# Patient Record
Sex: MALE | Race: WHITE | NOT HISPANIC OR LATINO | Employment: OTHER | URBAN - METROPOLITAN AREA
[De-identification: names, ages, dates, MRNs, and addresses within clinical notes are randomized per-mention and may not be internally consistent; named-entity substitution may affect disease eponyms.]

---

## 2018-03-15 ENCOUNTER — OFFICE VISIT (OUTPATIENT)
Dept: CARDIOLOGY | Facility: MEDICAL CENTER | Age: 78
End: 2018-03-15
Payer: MEDICARE

## 2018-03-15 VITALS
OXYGEN SATURATION: 92 % | HEART RATE: 90 BPM | BODY MASS INDEX: 26.48 KG/M2 | SYSTOLIC BLOOD PRESSURE: 158 MMHG | HEIGHT: 70 IN | WEIGHT: 185 LBS | DIASTOLIC BLOOD PRESSURE: 90 MMHG

## 2018-03-15 DIAGNOSIS — E67.3 HYPERVITAMINOSIS D: ICD-10-CM

## 2018-03-15 DIAGNOSIS — R00.0 TACHYCARDIA: ICD-10-CM

## 2018-03-15 DIAGNOSIS — I48.21 PERMANENT ATRIAL FIBRILLATION (HCC): ICD-10-CM

## 2018-03-15 DIAGNOSIS — R42 POSTURAL DIZZINESS: ICD-10-CM

## 2018-03-15 LAB — EKG IMPRESSION: NORMAL

## 2018-03-15 PROCEDURE — 99204 OFFICE O/P NEW MOD 45 MIN: CPT | Performed by: INTERNAL MEDICINE

## 2018-03-15 PROCEDURE — 93000 ELECTROCARDIOGRAM COMPLETE: CPT | Performed by: INTERNAL MEDICINE

## 2018-03-15 NOTE — PROGRESS NOTES
Subjective:   Elias Casanova is a 77 y.o. male who presents today having been referred by Dr. Jesus after an irregular heart rate was identified. This was earlier this week at which time the patient's thyroid supplement was discontinued. He has a long history of Parkinson's disease but with the treatment outlined by Dr. Jesus his symptoms are much improved and he feels much better. He does not have a sensation of an irregular heartbeat but can detect it if he tries to check his pulse. He has some postural lightheadedness but it is an inconsistent finding. There is a history of hypertension in the past. The patient denies orthopnea, PND, or pedal edema. He has not had angina, chest discomfort or history of previous myocardial infarction. His home regimen is noted below.. He has not had a stroke, TIA symptoms or symptoms of claudication.    Chief Complaint: Irregular heartbeat    Past Medical History:   Diagnosis Date   • Parkinson's disease (CMS-[facility])      History reviewed. No pertinent surgical history.  History reviewed. No pertinent family history.  History   Smoking Status   • Never Smoker   Smokeless Tobacco   • Never Used     Allergies   Allergen Reactions   • Novocain Unspecified     Eye swelling and redness     Outpatient Encounter Prescriptions as of 3/15/2018   Medication Sig Dispense Refill   • aspirin EC (ECOTRIN) 81 MG Tablet Delayed Response Take 81 mg by mouth every day.     • Tyrosine Powder 2.5 g by Does not apply route 4 times a day.     • Non Formulary Request Take 2,400 mg by mouth 4 times a day.     • Cysteine 500 MG Cap Take 3,000 mg by mouth 4 times a day.     • 5-Hydroxytryptophan (5-HTP) 100 MG Cap Take 300 mg by mouth 4 times a day.       No facility-administered encounter medications on file as of 3/15/2018.      ROS. The patient's review of systems sheet was evaluated with he and his wife and indicates that he has some ringing in his ears occasional sensation of  "breathlessness when he feels exhausted in the evening enlace flat. He typically prefers to sleep in the supine position without his head been elevated much. He has occasional abdominal pain and some urinary frequency. All other review responses are negative. He has had Parkinson's for 16 years is  and has a significant other. [place]     Objective:   /90   Pulse 90   Ht 1.778 m (5' 10\")   Wt 83.9 kg (185 lb)   SpO2 92%   BMI 26.54 kg/m²     Physical Exam   Exam:    Vitals:    03/15/18 1307   BP: 158/90   Pulse: 90   SpO2: 92%   Weight: 83.9 kg (185 lb)   Height: 1.778 m (5' 10\")     Constitutional: Well developed, well nourished male somewhat chronically ill appearing, in no acute distress. Appears approximate stated age and provides a good history. Supine blood pressure 160/9, sitting blood pressure 146/90, standing blood pressure 116/70. There was increased heart rate with position change.  HEENT: Normocephalic, pupils are equal and responsive. bilateral arcus. Conjunctiva and sclera are clear. No xanthelasma. No diagonal ear lobe crease noted. Mucus membranes are moist and pink. Good oral hygiene  Neck: No JVD or HJR. JVP = 4-5cm H2O. Good carotid upstroke with no bruit. No lymphadenopathy, No thyroid enlargement.  Cardiovascular:  Irregular rhythm. No murmur, gallop, rub or click. No lift, heave,  thrill, or cardiomegaly  Lungs: Normal effort, Clear to auscultation and percussion. No rales, rhonchi, wheezing   Abdomen: Soft, non tender. No liver, kidney, spleen or mass palpable. The abdominal aorta is not enlarged. Active bowel sounds are noted and there is no bruit  Extremities:  No cyanosis, edema, clubbing. Palpable posterior tibial and dorsal pedal pulses bilaterally.  Skin:   Warm and dry, no active lesions  Neurologic: Alert & oriented.  No lateralizing signs  Psychiatric:  Affect, mood, and judgement are appropriate    EKG reveals atrial fibrillation with " moderate ventricular response    Assessment:     1. Permanent atrial fibrillation (CMS-HCC)     2. Postural dizziness     3. Tachycardia  EKG    COMP METABOLIC PANEL    Echocardiogram Comp w/o Cont   4. Hypervitaminosis D  VITAMIN D 25-HYDROXY       Medical Decision Making:  Today's Assessment / Status / Plan:   The patient's EKG reveals atrial fibrillation with a moderate ventricular response. This is probably a chronic disorder. The patient has been taking aspirin but I suggested because of his age as a significant risk factor that full anticoagulation was probably indicated as aspirin tends to lose its affect on stroke prevention as someone ages. I gave him a list of Xarelto 20 mg a day, Eliquis 5 mg twice a day, and Pradaxa 150 mg twice a day. He will see which one is covered by his insurance and will notify us by phone at which time a prescription will be called in. Currently he does not need anything for rate control . If such is required, beta blocker therapy would be indicated. His medication list includes vitamin D 15,000 international units per day which may be excessive. It is possible that hypervitaminosis D could be aggravating his atrial fibrillation and heart rate response. I suggested that, temporarily at least, he discontinue the vitamin D supplement. He will return in one month at which time we will go over an echocardiogram which I ordered as well as his metabolic panel to make sure that we have the correct dose of his anticoagulation.    His postural hypotension is significant as noted above. This is likely due to Parkinson's disease and rather than a dressing it with medications I suggested that he just take his time about changing position to avoid this symptom. He will return tomorrow to assess his response to therapy.

## 2018-03-15 NOTE — PROGRESS NOTES
"Subjective:   Elias Casanova is a 77 y.o. male who presents today having been referred by his endocrinologist, Dr. Dotson, because of vascular calcification noted on the hand x-ray which was done for other reasons. The patient has had type 1 diabetes for 48 years currently being controlled with an insulin pump. His most recent A-1 C is 8. He has no anginal-type symptoms and is quite active in his day-to-day work. 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 He has a very slight sensation of breathlessness when doing some of his tasks which he is usually able to work through and then can carry on for the rest of the day without problems. He has no symptoms of congestive heart failure. There is no orthopnea, PND, or pedal edema. He has no symptoms of stroke, TIA, or claudication. He takes lisinopril for hypertension and lovastatin for hyperlipidemia. He has a rare episode of postural dizziness.    Chief Complaint: dyspnea    Past Medical History:   Diagnosis Date   • Parkinson's disease (CMS-[facility])      History reviewed. No pertinent surgical history.  History reviewed. No pertinent family history.  History   Smoking Status   • Never Smoker   Smokeless Tobacco   • Never Used     Allergies   Allergen Reactions   • Novocain Unspecified     Eye swelling and redness     Outpatient Encounter Prescriptions as of 3/15/2018   Medication Sig Dispense Refill   • aspirin EC (ECOTRIN) 81 MG Tablet Delayed Response Take 81 mg by mouth every day.     • Tyrosine Powder 2.5 g by Does not apply route 4 times a day.     • Non Formulary Request Take 2,400 mg by mouth 4 times a day.     • Cysteine 500 MG Cap Take 3,000 mg by mouth 4 times a day.     • 5-Hydroxytryptophan (5-HTP) 100 MG Cap Take 300 mg by mouth 4 times a day.       No facility-administered encounter medications on file as of 3/15/2018.      ROS. See HPI     Objective:   /90   Pulse 90   Ht 1.778 m (5' 10\")   Wt 83.9 kg " "(185 lb)   SpO2 92%   BMI 26.54 kg/m²     Physical Exam   Exam:    Vitals:    03/15/18 1307   BP: 158/90   Pulse: 90   SpO2: 92%   Weight: 83.9 kg (185 lb)   Height: 1.778 m (5' 10\")     Constitutional: Well developed, well nourished, slightly obese male in no acute distress. Appears approximate stated age and provides a good history. Repeat blood pressure supine 146/74, sitting 130/62, standing 134/62, standing for 2 minutes unchanged  HEENT: Normocephalic, pupils are equal and responsive. Slight arcus. Conjunctiva and sclera are clear. No xanthelasma. No diagonal ear lobe crease noted. Mucus membranes are moist and pink. Good oral hygiene  Neck: No JVD or HJR. JVP = 4-5cm H2O. Good carotid upstroke with no bruit. No lymphadenopathy, No thyroid enlargement.  Cardiovascular: Regular rhythm, no ectopics. No murmur, gallop, rub or click. No lift, heave,  thrill, or cardiomegaly  Lungs: Normal effort, Clear to auscultation and percussion. No rales, rhonchi, wheezing   Abdomen: Soft, non tender. No liver, kidney, spleen or mass palpable. The abdominal aorta is not palpable. Active bowel sounds are noted and there is no bruit  Extremities:  No cyanosis, edema, clubbing. 1+ posterior tibial and dorsal pedal pulses bilaterally.   Skin:   Warm and dry, no active lesions  Neurologic: Alert & oriented. Strength and sensation grossly intact. No lateralizing signs  Psychiatric:  Affect, mood, and judgement are appropriate    Assessment:     1. 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 Vascular calcification  RIH Treadmill Stress       Medical Decision Making:  Today's Assessment / Status / Plan:   The patient's exertional dyspnea as a vague symptom only offered after suggestion. He does not have angina but that would not be unusual in a long-standing diabetic. He has extensive vascular calcification of his radial and ulnar arteries as noted on x-ray. It is likely that he " has diffuse calcification elsewhere but no symptoms of of vascular insufficiency. Because he may very well have silent ischemia think it would be reasonable to do a stress treadmill to be scheduled in the near future. He does not have substantial postural blood pressure changes today but he is taking a generous dose of lisinopril. His episodes of postural lightheadedness are infrequent and I suggested that he change position slowly because there is benefit of taking ACE inhibitor for kidney protection and his blood pressure is not low at baseline. He will return in a month or so after the treadmill or sooner if the treadmill is abnormal.

## 2018-03-16 ENCOUNTER — TELEPHONE (OUTPATIENT)
Dept: CARDIOLOGY | Facility: MEDICAL CENTER | Age: 78
End: 2018-03-16

## 2018-03-16 DIAGNOSIS — I48.21 PERMANENT ATRIAL FIBRILLATION (HCC): ICD-10-CM

## 2018-03-16 RX ORDER — DABIGATRAN ETEXILATE 150 MG/1
150 CAPSULE ORAL 2 TIMES DAILY
Qty: 180 CAP | Refills: 3 | Status: SHIPPED | OUTPATIENT
Start: 2018-03-16 | End: 2019-02-09 | Stop reason: SDUPTHER

## 2018-03-16 NOTE — TELEPHONE ENCOUNTER
Pt calling back to report that he reviewed cost of NOAC per Dr. Brice's request. Pt would like to start Pradaxa 150 mg BID. Pt educated on medication and questions answered. At this time, pt requesting medication to be sent to [facility] pharmacy in [facility] off [address]. He notes that he did stop taking vit. D per Dr. Brice request. Pt denies further questions at this time and is appreciative of assistance.     Pradaxa 150mg BID #180 with 3 refills called into pharmacy    ALEJANDRA LOGAN  ----- Message from Cecelia Dumont sent at 3/16/2018 12:09 PM PDT -----  Regarding: patient following up after yesterday's appt  DESMOND/Tonia    Patient is following up after seeing Dr. Brice yesterday. He can be reached at [phone].

## 2018-03-23 ENCOUNTER — TELEPHONE (OUTPATIENT)
Dept: CARDIOLOGY | Facility: MEDICAL CENTER | Age: 78
End: 2018-03-23

## 2018-03-23 NOTE — TELEPHONE ENCOUNTER
Discussed with Dr. Brice while in office today. No treadmill needed at this time. Pt does require echocardiogram which has been scheduled.

## 2018-03-23 NOTE — TELEPHONE ENCOUNTER
----- Message from Cecelia Dumont sent at 3/23/2018  2:35 PM PDT -----  Regarding: patient calling about treadmill order  DESMOND/Evi      Patient says he has an order for a treadmill test but the order doesn't have a diagnosis code and it's missing the doctor's signature. He can be reached at [phone].

## 2018-03-23 NOTE — TELEPHONE ENCOUNTER
Spoke to pt. States he has a treadmill order on a piece of paper from when he checked out. The order doesn't have a diagnosis or signature according to pt. He wants to have test done closer to where he lives and they require this info. Unable to find order at this time. Explained to pt, will research at call back.

## 2018-04-06 ENCOUNTER — TELEPHONE (OUTPATIENT)
Dept: CARDIOLOGY | Facility: MEDICAL CENTER | Age: 78
End: 2018-04-06

## 2018-04-06 NOTE — TELEPHONE ENCOUNTER
Pt to FU with TF 4/19 to discuss echo results. Noted.     ----- Message from Cecilio Brice M.D. sent at 4/6/2018  7:53 AM PDT -----  I will speak with him at his Appt later this month. Nothing critical

## 2018-04-26 ENCOUNTER — OFFICE VISIT (OUTPATIENT)
Dept: CARDIOLOGY | Facility: MEDICAL CENTER | Age: 78
End: 2018-04-26
Payer: MEDICARE

## 2018-04-26 VITALS
RESPIRATION RATE: 16 BRPM | SYSTOLIC BLOOD PRESSURE: 140 MMHG | DIASTOLIC BLOOD PRESSURE: 80 MMHG | HEIGHT: 70 IN | WEIGHT: 184 LBS | BODY MASS INDEX: 26.34 KG/M2 | HEART RATE: 62 BPM | OXYGEN SATURATION: 95 %

## 2018-04-26 DIAGNOSIS — I48.21 PERMANENT ATRIAL FIBRILLATION (HCC): ICD-10-CM

## 2018-04-26 PROCEDURE — 99213 OFFICE O/P EST LOW 20 MIN: CPT | Performed by: INTERNAL MEDICINE

## 2018-04-26 RX ORDER — HYDROCORTISONE 5 MG/1
TABLET ORAL
COMMUNITY
Start: 2018-02-26 | End: 2018-06-29

## 2018-04-26 RX ORDER — HYDROCORTISONE 20 MG/1
TABLET ORAL
COMMUNITY
Start: 2018-03-08 | End: 2018-06-29

## 2018-04-26 NOTE — PROGRESS NOTES
Chief Complaint   Patient presents with   • Atrial Fibrillation       Subjective:   Elias Casanova is a 77 y.o. male who presents today for follow-up of atrial fibrillation. The patient decided on Pradaxa as a form of anticoagulation because it has a reversible agent available. Had no trouble with the medication. Echocardiogram was done revealed normal left ventricular systolic function with no major valvular dysfunction. The right ventricular pressure was moderately elevated at 48. His vitamin D level was 38. Liver and kidney function normal  Past Medical History:   Diagnosis Date   • Parkinson's disease (CMS-[facility])      History reviewed. No pertinent surgical history.  History reviewed. No pertinent family history.  Social History     Social History   • Marital status: Single     Spouse name: N/A   • Number of children: N/A   • Years of education: N/A     Occupational History   • Not on file.     Social History Main Topics   • Smoking status: Never Smoker   • Smokeless tobacco: Never Used   • Alcohol use No   • Drug use: Unknown   • Sexual activity: Not on file     Other Topics Concern   • Not on file     Social History Narrative   • No narrative on file     Allergies   Allergen Reactions   • Novocain Unspecified     Eye swelling and redness     Outpatient Encounter Prescriptions as of 2018   Medication Sig Dispense Refill   • NON SPECIFIED L-DOPA, SI TSP. BY MOUTH THREE TIMES A DAY.     • NON SPECIFIED ADRENAL FACTOR OTC, SIG: 3 CAPS BY MOUTH TWICE A DAY.     • Misc Natural Products (PROSTATE HEALTH PO) Take 1 Tab by mouth 2 Times a Day.     • dabigatran (PRADAXA) 150 MG Cap capsule Take 1 Cap by mouth 2 Times a Day. 180 Cap 3   • aspirin EC (ECOTRIN) 81 MG Tablet Delayed Response Take 81 mg by mouth every day.     • Tyrosine Powder 2.5 g by Does not apply route 3 times a day.     • Cysteine 500 MG Cap Take 3,000 mg by mouth 3 times a day.     • 5-Hydroxytryptophan (5-HTP) 100 MG Cap Take 300 mg by mouth 3  "times a day.     • hydrocortisone (CORTEF) 20 MG Tab      • hydrocortisone (CORTEF) 5 MG Tab      • Non Formulary Request Take 2,400 mg by mouth 4 times a day.       No facility-administered encounter medications on file as of 4/26/2018.      ROS. See history of present illness     Objective:   /80   Pulse 62   Resp 16   Ht 1.778 m (5' 10\")   Wt 83.5 kg (184 lb)   SpO2 95%   BMI 26.40 kg/m²     Physical Exam General: WD, WN, male in NAD. Weight is unchanged  Neck: No JVD. Good carotid upstroke and no bruit  Chest: Clear to A & P  Heart: Slightly irregular rhythm, Normal S1 and S2. No murmur, gallop, rub, click  Ext: No edema  Neuro: Alert, oriented  Psych: normal mood, affect    Assessment:     1. Permanent atrial fibrillation (CMS-[facility])         Medical Decision Making:  Today's Assessment / Status / Plan:   The patient remains in atrial fibrillation although the rhythm is fairly slow and only slightly irregular. He is taken Pradaxa for stroke prevention and chose this medication because of the availability of a reversal agent. He's had no side effects of the medication and will continue to take it. He notices that it is expensive. He is willing to take the medication to reduce his risk of stroke. He would like to come back in a year check think is reasonable. If there are any issues along the way he can come back sooner. Liver and kidney function will be checked off and on through the year by Dr. Jesus. If there is a serious change the dosage of Pradaxa might need to be reduced.  "

## 2018-05-17 ENCOUNTER — TELEPHONE (OUTPATIENT)
Dept: CARDIOLOGY | Facility: MEDICAL CENTER | Age: 78
End: 2018-05-17

## 2018-05-17 NOTE — TELEPHONE ENCOUNTER
Pt notified of Echo results per Dr. Brice. He is appreciative of information and denies further needs at this time.     ----- Message from Cecilio Brice M.D. sent at 5/17/2018  8:30 AM PDT -----  He will return prn or if sent by Dr. Leong    ----- Message -----  From: Tonia Gardner R.N.  Sent: 5/15/2018   2:41 PM  To: Cecilio Brice M.D.    No cardiology FU scheduled

## 2018-06-29 PROBLEM — G20.A1 PARKINSON DISEASE (HCC): Status: ACTIVE | Noted: 2018-06-29

## 2018-06-29 PROBLEM — G47.30 SLEEP APNEA: Status: ACTIVE | Noted: 2018-06-29

## 2018-06-29 PROBLEM — I27.20 PULMONARY HYPERTENSION (HCC): Status: ACTIVE | Noted: 2018-06-29

## 2018-06-29 PROBLEM — E87.1 HYPONATREMIA: Status: ACTIVE | Noted: 2018-06-29

## 2018-06-29 PROBLEM — R21 RASH: Status: ACTIVE | Noted: 2018-06-29

## 2018-06-29 PROBLEM — I48.19 PERSISTENT ATRIAL FIBRILLATION (HCC): Status: ACTIVE | Noted: 2018-06-29

## 2018-06-29 PROBLEM — Z79.890 HORMONE REPLACEMENT THERAPY: Status: ACTIVE | Noted: 2018-06-29

## 2018-06-29 PROBLEM — R79.89 ELEVATED LIVER FUNCTION TESTS: Status: ACTIVE | Noted: 2018-06-29

## 2019-01-08 ENCOUNTER — TELEPHONE (OUTPATIENT)
Dept: CARDIOLOGY | Facility: MEDICAL CENTER | Age: 79
End: 2019-01-08

## 2019-01-08 NOTE — TELEPHONE ENCOUNTER
medications and lab order   Received: Yesterday   Message Contents   CECILIA Andrew/Celina         Patient is calling to discuss his medications and also about a lab order. He can be reached at [phone].      Attempted to contact patient, no answer. LVM to call back.

## 2019-02-09 DIAGNOSIS — I48.21 PERMANENT ATRIAL FIBRILLATION (HCC): ICD-10-CM

## 2019-02-14 RX ORDER — ATENOLOL 100 MG/1
TABLET ORAL
Qty: 180 CAP | Refills: 0 | Status: SHIPPED | OUTPATIENT
Start: 2019-02-14 | End: 2019-06-08 | Stop reason: SDUPTHER

## 2019-03-15 ENCOUNTER — TELEPHONE (OUTPATIENT)
Dept: CARDIOLOGY | Facility: MEDICAL CENTER | Age: 79
End: 2019-03-15

## 2019-03-15 NOTE — TELEPHONE ENCOUNTER
"pradaxa/nosebleed   Received: Today   Message Contents   Minalharpreet Carranza, Med Ass't  Celina Sanchez R.N.   Phone Number: [phone]             TF     Pt is taking Pradaxa & got a nosebleed today. He feels it will happen again if he sneezes. He wants advice on how to proceed   Ph#: [phone]      Contacted patient, he states his nose bleed has \"cleared itself up\".  Educated patient on how to handle future nose bleeds and when to see emergency care.  Patient verbalized all understanding.       "

## 2019-06-08 DIAGNOSIS — I48.21 PERMANENT ATRIAL FIBRILLATION (HCC): ICD-10-CM

## 2019-06-11 RX ORDER — ATENOLOL 100 MG/1
TABLET ORAL
Qty: 60 CAP | Refills: 0 | Status: SHIPPED | OUTPATIENT
Start: 2019-06-11 | End: 2019-07-10 | Stop reason: SDUPTHER

## 2019-06-20 ENCOUNTER — OFFICE VISIT (OUTPATIENT)
Dept: CARDIOLOGY | Facility: MEDICAL CENTER | Age: 79
End: 2019-06-20
Payer: MEDICARE

## 2019-06-20 VITALS
DIASTOLIC BLOOD PRESSURE: 90 MMHG | OXYGEN SATURATION: 92 % | HEIGHT: 70 IN | SYSTOLIC BLOOD PRESSURE: 168 MMHG | WEIGHT: 179 LBS | BODY MASS INDEX: 25.62 KG/M2 | HEART RATE: 84 BPM

## 2019-06-20 DIAGNOSIS — I48.0 PAROXYSMAL ATRIAL FIBRILLATION (HCC): ICD-10-CM

## 2019-06-20 DIAGNOSIS — I27.20 PULMONARY HYPERTENSION (HCC): ICD-10-CM

## 2019-06-20 PROCEDURE — 99213 OFFICE O/P EST LOW 20 MIN: CPT | Performed by: INTERNAL MEDICINE

## 2019-06-20 RX ORDER — HYDROCORTISONE 5 MG/1
30 TABLET ORAL DAILY
COMMUNITY
Start: 2019-06-11 | End: 2021-04-01

## 2019-06-20 NOTE — PROGRESS NOTES
Chief Complaint   Patient presents with   • Atrial Fibrillation   • Pulmonary Hypertension       Subjective:   Elias Casanova is a 78 y.o. male who presents today in follow-up of atrial fibrillation.  Patient has a computerized smart phone lorenza that checks his pulse rate.  For the most part he runs between mid 60s to the mid 70s when he is in sinus rhythm and up to 100 when in atrial fibrillation.  His device is pretty accurate as he is shows me the rhythm strips that he has obtained.  Patient sometimes gets low blood pressure for which he exercises on his elliptical for a minute, rests 5 minutes, then repeats until his blood pressure comes up.  He takes hydrocortisone to keep his blood pressure up and it is rarely over 140 systolic at home.  It is often  systolic at which time he feels weak, tired, and with no energy.  He has managed the situation fairly well.  He takes Pradaxa for stroke prevention and has had no trouble with this medication.    Past Medical History:   Diagnosis Date   • Parkinson's disease (HCC)      History reviewed. No pertinent surgical history.  History reviewed. No pertinent family history.  Social History     Social History   • Marital status: Single     Spouse name: N/A   • Number of children: N/A   • Years of education: N/A     Occupational History   • Not on file.     Social History Main Topics   • Smoking status: Never Smoker   • Smokeless tobacco: Never Used   • Alcohol use No   • Drug use: Unknown   • Sexual activity: Not on file     Other Topics Concern   • Not on file     Social History Narrative   • No narrative on file     Allergies   Allergen Reactions   • Novocain Unspecified     Eye swelling and redness     Outpatient Encounter Prescriptions as of 6/20/2019   Medication Sig Dispense Refill   • hydrocortisone (CORTEF) 5 MG Tab Take 20 mg by mouth every day.     • PRADAXA 150 MG Cap capsule TAKE ONE CAPSULE BY MOUTH TWICE DAILY  60 Cap 0   • Magnesium 125 MG Cap Take  by  "mouth.     • MELATONIN MAXIMUM STRENGTH PO Take 65 mg by mouth every day.     • anastrozole (ARIMIDEX) 1 MG Tab Take 1 mg by mouth every day.     • DHEA 25 MG Tab Take 25 mg by mouth every day.     • DOPamine HCl Powder 1 Dose by Does not apply route.     • Misc Natural Products (PROSTATE HEALTH PO) Take 1 Tab by mouth 2 Times a Day.     • Tyrosine Powder 2.5 g by Does not apply route 3 times a day.     • Cysteine 500 MG Cap Take 3,000 mg by mouth 3 times a day.     • vitamin D3, cholecalciferol, 400 UNIT Tab tablet Take 400 Units by mouth every day.     • vitamin e (VITAMIN E) 400 UNIT Cap Take 400 Units by mouth every day.     • vitamin E (VITAMIN E) 1000 UNIT Cap Take 1 Cap by mouth every day.     • Coenzyme Q10 (COQ-10) 100 MG Cap CR Take  by mouth.     • niacin 500 MG Tab Take 500 mg by mouth every day.     • Cholecalciferol (D-5000 PO) Take  by mouth.     • 5-Hydroxytryptophan (5-HTP) 100 MG Cap Take 300 mg by mouth 3 times a day.       No facility-administered encounter medications on file as of 6/20/2019.      ROS see HPI     Objective:   BP (!) 168/90 (BP Location: Left arm, Patient Position: Sitting, BP Cuff Size: Adult)   Pulse 84   Ht 1.778 m (5' 10\")   Wt 81.2 kg (179 lb)   SpO2 92%   BMI 25.68 kg/m²     Physical Exam General: WD, WN, male in NAD.  Repeat blood pressure 160/100 right arm sitting  Neck: No HJR, JVD. JVP 4-5 cm H2O. Good carotid upstroke and no bruit  Chest: Clear to A & P  Heart: Regular rhythm, Normal S1 and S2. No murmur, gallop, rub, click  Ext: No edema  Neuro: Alert, oriented  Psych: normal mood, affect  I reviewed some of the history regarding his pulse rate recordings.  The device seems to be fairly accurate.  Assessment:     1. Paroxysmal atrial fibrillation (HCC)     2. Pulmonary hypertension (HCC)         Medical Decision Making:  Today's Assessment / Status / Plan:   The patient is pretty well controlled on his current regimen with nothing required for rate control and " Pradaxa used for stroke prevention.  He has no unusual symptoms other than relative hypotension at the end of his hydrocortisone dose when it is time to take another pill.  He has managed this very successfully and I offered no recommendations along those lines.  His blood pressure today was elevated when checked by the nurse and by me.  He will continue to check his blood pressure and if it is consistently elevated he will let us know.  I hesitate to change any of his medications because his situation is fairly complicated.  He is back taking a little thyroid medication as well.  As long as he continues to feel well he will return in 1 year.

## 2019-07-10 DIAGNOSIS — I48.21 PERMANENT ATRIAL FIBRILLATION (HCC): ICD-10-CM

## 2019-07-10 RX ORDER — DABIGATRAN ETEXILATE 150 MG/1
150 CAPSULE ORAL 2 TIMES DAILY
Qty: 180 CAP | Refills: 3 | Status: SHIPPED | OUTPATIENT
Start: 2019-07-10 | End: 2020-09-21

## 2019-11-15 ENCOUNTER — TELEPHONE (OUTPATIENT)
Dept: CARDIOLOGY | Facility: MEDICAL CENTER | Age: 79
End: 2019-11-15

## 2019-11-15 NOTE — TELEPHONE ENCOUNTER
DESMOND Robles @ [facility] she faxed a clearance to [phone] on 11/8 3x & is following up on the status of that. #[phone]

## 2019-11-15 NOTE — TELEPHONE ENCOUNTER
Clearance received from [facility] from Dr. Xander Quintanilla   For Right Inguinal Hernia Repair  Requesting to hold Pradaxa     Last OV 6/20/19 by TF  Dx: PAF  On Pradaxa  No hx of CVA/TIA  Parkinson's dz  Last echo 5/15/18 (in media) EF 55%    Fax to # [phone] attn Margaret      To A.D.CHARISMA (in absence of TF) to review and advise on surgical clearance request

## 2019-11-18 NOTE — TELEPHONE ENCOUNTER
HASEEB Kendrick RJUDITH   Caller: Unspecified (3 days ago, 11:17 AM)             Moderate risk   May hold 3 days before      Letter generated and faxed to [phone] avila Robles

## 2020-07-25 PROBLEM — D72.829 LEUKOCYTOSIS: Status: ACTIVE | Noted: 2020-07-25

## 2020-07-25 PROBLEM — D75.839 THROMBOCYTOSIS: Status: ACTIVE | Noted: 2020-07-25

## 2020-07-25 PROBLEM — R53.1 WEAKNESS: Status: ACTIVE | Noted: 2020-07-25

## 2020-09-17 DIAGNOSIS — I48.21 PERMANENT ATRIAL FIBRILLATION (HCC): ICD-10-CM

## 2020-09-21 RX ORDER — ATENOLOL 100 MG/1
TABLET ORAL
Qty: 180 CAP | Refills: 0 | Status: SHIPPED | OUTPATIENT
Start: 2020-09-21 | End: 2021-01-06

## 2020-10-21 ENCOUNTER — OFFICE VISIT (OUTPATIENT)
Dept: CARDIOLOGY | Facility: MEDICAL CENTER | Age: 80
End: 2020-10-21
Payer: MEDICARE

## 2020-10-21 VITALS
BODY MASS INDEX: 23.77 KG/M2 | SYSTOLIC BLOOD PRESSURE: 122 MMHG | DIASTOLIC BLOOD PRESSURE: 80 MMHG | HEART RATE: 110 BPM | OXYGEN SATURATION: 95 % | WEIGHT: 166 LBS | HEIGHT: 70 IN

## 2020-10-21 DIAGNOSIS — I27.20 PULMONARY HYPERTENSION (HCC): ICD-10-CM

## 2020-10-21 DIAGNOSIS — I48.19 PERSISTENT ATRIAL FIBRILLATION (HCC): ICD-10-CM

## 2020-10-21 PROCEDURE — 99214 OFFICE O/P EST MOD 30 MIN: CPT | Performed by: INTERNAL MEDICINE

## 2020-10-21 RX ORDER — POTASSIUM CHLORIDE 20 MEQ/1
TABLET, EXTENDED RELEASE ORAL
COMMUNITY
Start: 2020-08-26 | End: 2023-02-15

## 2020-10-21 RX ORDER — FLUDROCORTISONE ACETATE 0.1 MG/1
TABLET ORAL
COMMUNITY
Start: 2020-10-14 | End: 2022-06-02

## 2020-10-21 RX ORDER — METOPROLOL SUCCINATE 50 MG/1
50 TABLET, EXTENDED RELEASE ORAL DAILY
Qty: 90 TAB | Refills: 3 | Status: SHIPPED | OUTPATIENT
Start: 2020-10-21 | End: 2020-11-20 | Stop reason: SDUPTHER

## 2020-10-21 RX ORDER — HYDROCORTISONE 10 MG/1
TABLET ORAL
COMMUNITY
Start: 2020-08-26 | End: 2020-10-21

## 2020-10-21 ASSESSMENT — FIBROSIS 4 INDEX: FIB4 SCORE: 1.69

## 2020-10-21 NOTE — PROGRESS NOTES
"CARDIOLOGY OUTPATIENT FOLLOWUP    PCP: Luz Quiroga D.O.    1. Persistent atrial fibrillation (HCC)  Recently converted from paroxysmal to persistent.  Heart rate is poorly controlled and he is feeling \"slow.\"  I recommended metoprolol XL 50 mg daily.  He will call in 2 weeks with resting heart rate readings.  He will remain on Pradaxa.  I will update the echocardiogram    2. Pulmonary hypertension (HCC)  No signs of right heart failure.  Echocardiogram to be updated    3.  Encounter for high risk medication use  No complications with Pradaxa.  Dosed appropriately.  Continue plan of care    Follow up with Marciano Latif M.D. in 2 months     Chief Complaint   Patient presents with   • Atrial Fibrillation     Paroxysmal atrial fibrillation        History: Elias Casanova is a 79 y.o. male with a past medical history of Parkinson's disease presenting for follow up of atrial fibrillation.  He does not regularly monitor his condition but does have a Kardia device and in the week preceding her appointment he has noted 100% A. fib on daily readings with average heart rates of around 100 bpm.  Previously he noted that only 50% of the readings would show A. fib.  He had not been monitoring himself though over the past year.  Over the past few days he has felt more fatigued than the past.  He continues to struggle with Parkinson's which she states is mostly stable, but gradually progressive.  He still is able to maintain his own home and walks around the block daily.  He previously took Requip but became dissatisfied with the treatment and is now cared for by a homeopathic provider in [place].    ROS:  All other systems reviewed and negative except as per the HPI    PE:  /80 (BP Location: Left arm, Patient Position: Sitting, BP Cuff Size: Adult)   Pulse (!) 110   Ht 1.778 m (5' 10\")   Wt 75.3 kg (166 lb)   SpO2 95%   BMI 23.82 kg/m²   Gen: Well appearing  HEENT: Symmetric face. Anicteric sclerae. Moist " mucus membranes  NECK: No JVD. No lymphadenopathy  CARDIAC: Normal PMI, regular, normal S1, S2. without murmur  VASCULATURE: Normal carotid amplitude without bruit.   RESP: Clear to auscultation bilaterally  ABD: Soft, non-tender, non-distended  EXT: No edema, no clubbing or cyanosis  SKIN: Warm and dry  NEURO: No gross deficits  PSYCH: Appropriate affect, participates in conversation    Past Medical History:   Diagnosis Date   • Parkinson's disease (HCC)      Allergies   Allergen Reactions   • Novocain Unspecified     Eye swelling and redness     Outpatient Encounter Medications as of 10/21/2020   Medication Sig Dispense Refill   • potassium chloride SA (KDUR) 20 MEQ Tab CR      • fludrocortisone (FLORINEF) 0.1 MG Tab      • metoprolol SR (TOPROL XL) 50 MG TABLET SR 24 HR Take 1 Tab by mouth every day. 90 Tab 3   • PRADAXA 150 MG Cap capsule TAKE ONE CAPSULE BY MOUTH TWICE DAILY  180 Cap 0   • hydrocortisone (CORTEF) 5 MG Tab Take 30 mg by mouth every day.     • Magnesium 125 MG Cap Take  by mouth.     • MELATONIN MAXIMUM STRENGTH PO Take 65 mg by mouth every day.     • anastrozole (ARIMIDEX) 1 MG Tab Take 1 mg by mouth every day.     • DHEA 25 MG Tab Take 25 mg by mouth every day.     • vitamin D3, cholecalciferol, 400 UNIT Tab tablet Take 400 Units by mouth every day.     • vitamin e (VITAMIN E) 400 UNIT Cap Take 400 Units by mouth every day.     • vitamin E (VITAMIN E) 1000 UNIT Cap Take 1 Cap by mouth every day.     • Coenzyme Q10 (COQ-10) 100 MG Cap CR Take  by mouth.     • niacin 500 MG Tab Take 500 mg by mouth every day.     • Cholecalciferol (D-5000 PO) Take  by mouth.     • DOPamine HCl Powder 1 Dose by Does not apply route.     • Misc Natural Products (PROSTATE HEALTH PO) Take 1 Tab by mouth 2 Times a Day.     • Tyrosine Powder 2.5 g by Does not apply route 3 times a day.     • Cysteine 500 MG Cap Take 3,000 mg by mouth 3 times a day.     • 5-Hydroxytryptophan (5-HTP) 100 MG Cap Take 300 mg by mouth 3  times a day.     • [DISCONTINUED] hydrocortisone (CORTEF) 10 MG Tab        No facility-administered encounter medications on file as of 10/21/2020.      Social History     Socioeconomic History   • Marital status: Single     Spouse name: Not on file   • Number of children: Not on file   • Years of education: Not on file   • Highest education level: Not on file   Occupational History   • Not on file   Social Needs   • Financial resource strain: Not on file   • Food insecurity     Worry: Not on file     Inability: Not on file   • Transportation needs     Medical: Not on file     Non-medical: Not on file   Tobacco Use   • Smoking status: Never Smoker   • Smokeless tobacco: Never Used   Substance and Sexual Activity   • Alcohol use: No   • Drug use: Not on file   • Sexual activity: Not on file   Lifestyle   • Physical activity     Days per week: Not on file     Minutes per session: Not on file   • Stress: Not on file   Relationships   • Social connections     Talks on phone: Not on file     Gets together: Not on file     Attends Confucianism service: Not on file     Active member of club or organization: Not on file     Attends meetings of clubs or organizations: Not on file     Relationship status: Not on file   • Intimate partner violence     Fear of current or ex partner: Not on file     Emotionally abused: Not on file     Physically abused: Not on file     Forced sexual activity: Not on file   Other Topics Concern   • Not on file   Social History Narrative   • Not on file       Studies  No results found for: CHOLSTRLTOT, LDL, HDL, TRIGLYCERIDE    Lab Results   Component Value Date/Time    SODIUM 135 (L) 08/11/2020 07:35 AM    POTASSIUM 4.9 08/11/2020 07:35 AM    CHLORIDE 110 (H) 08/11/2020 07:35 AM    CO2 13 (L) 08/11/2020 07:35 AM    GLUCOSE 71 08/11/2020 07:35 AM    BUN 27 (H) 08/11/2020 07:35 AM    CREATININE 0.6 (L) 08/11/2020 07:35 AM     Lab Results   Component Value Date/Time    ALKPHOSPHAT 93 08/11/2020 07:35  AM    ASTSGOT 49 (H) 08/11/2020 07:35 AM    ALTSGPT 45 08/11/2020 07:35 AM    TBILIRUBIN 1.6 (H) 08/11/2020 07:35 AM        For this encounter I directly reviewed medical records I agree with the interpretations in the electronic health record

## 2020-11-16 ENCOUNTER — TELEPHONE (OUTPATIENT)
Dept: CARDIOLOGY | Facility: MEDICAL CENTER | Age: 80
End: 2020-11-16

## 2020-11-16 NOTE — TELEPHONE ENCOUNTER
EC-ECHOCARDIOGRAM COMPLETE W/O CONT  Marciano Latif M.D.  Emily Paz R.N.             Echocardiogram report received.  Overall the heart function looks good but still persistent episodes of high heart rate were noted during the exam.  I recommend increasing metoprolol XL to 100 mg daily (assuming blood pressure remains greater than 100 systolic).  Follow-up with myself around the end of the month or early December-if symptoms persist, will pursue cardioversion      LVM with pt at [phone] requesting call back to discuss echo results and recommendations to increase metoprolol.

## 2020-11-20 PROBLEM — D75.839 THROMBOCYTOSIS: Status: RESOLVED | Noted: 2020-07-25 | Resolved: 2020-11-20

## 2020-11-20 PROBLEM — E87.1 HYPONATREMIA: Status: RESOLVED | Noted: 2018-06-29 | Resolved: 2020-11-20

## 2020-11-20 RX ORDER — METOPROLOL SUCCINATE 50 MG/1
100 TABLET, EXTENDED RELEASE ORAL DAILY
Qty: 90 TAB | Refills: 3 | COMMUNITY
Start: 2020-11-20 | End: 2020-12-17

## 2020-11-21 NOTE — TELEPHONE ENCOUNTER
Called pt again. Reviewed echo results and recommendations to increase Toprol to 100mg daily. Pt says that his BP is usually around 110/70, but occasionally it is 90s/70s. Requested that pt try taking 100mg daily and write down BP and HR every day, and notify us if SBP is regularly less than 100.

## 2020-12-17 ENCOUNTER — OFFICE VISIT (OUTPATIENT)
Dept: CARDIOLOGY | Facility: MEDICAL CENTER | Age: 80
End: 2020-12-17
Payer: MEDICARE

## 2020-12-17 VITALS
HEART RATE: 87 BPM | SYSTOLIC BLOOD PRESSURE: 107 MMHG | WEIGHT: 160 LBS | DIASTOLIC BLOOD PRESSURE: 86 MMHG | BODY MASS INDEX: 22.9 KG/M2 | HEIGHT: 70 IN

## 2020-12-17 DIAGNOSIS — E05.90 HYPERTHYROIDISM: ICD-10-CM

## 2020-12-17 DIAGNOSIS — G47.30 SLEEP APNEA, UNSPECIFIED TYPE: ICD-10-CM

## 2020-12-17 DIAGNOSIS — I27.20 PULMONARY HYPERTENSION (HCC): ICD-10-CM

## 2020-12-17 DIAGNOSIS — I48.19 PERSISTENT ATRIAL FIBRILLATION (HCC): ICD-10-CM

## 2020-12-17 PROCEDURE — 99213 OFFICE O/P EST LOW 20 MIN: CPT | Performed by: INTERNAL MEDICINE

## 2020-12-17 ASSESSMENT — FIBROSIS 4 INDEX: FIB4 SCORE: 2.6

## 2020-12-17 NOTE — PROGRESS NOTES
Telephone Appointment Visit   As a means of avoiding spread of COVID-19, this visit is being conducted by telephone. This telephone visit was initiated by the patient and they verbally consented. Efforts were unsuccessful of completing the visit using video conferencing software.     Time at start of call: 1:44 pm.     Reason for Call:  Symptom Follow-up      Assessment and Plan:   PCP: Luz Quiroga D.O.  The following treatment plan was discussed:     1. Persistent atrial fibrillation (HCC)  2. Pulmonary hypertension (HCC)  3. Hyperthyroidism  4. Sleep apnea, unspecified type    Ongoing mental fatigue which is mild and well-tolerated.  Given the pandemic he is reluctant to pursue rhythm control at this time but may be interested in the coming months.  For now he has achieved adequate rate control without metoprolol by simply reducing the dose of thyroid supplement to treat iatrogenic hyperthyroidism.    Follow up: Return in about 5 months (around 5/17/2021).    Subjective:   History: Elias Casanova is a 80 y.o. male with a past medical history of Parkinson's disease presenting for follow up of atrial fibrillation.  He recently converted from paroxysmal to persistent atrial fibrillation and around that time began experiencing a feeling of slowness.  When I last saw him he was in rapid ventricular response and metoprolol was added.  He subsequently completed an echocardiogram which showed ongoing atrial fibrillation with rapid ventricular response.  When we called to explore whether more metoprolol might be indicated the patient informed us he was experiencing symptomatic hypotension as a consequence of the 50 mg dosage.  And instead he had discontinued the metoprolol altogether.  He did direct his efforts towards reducing thyroid supplementation as he was identified as having iatrogenic hyper thyroid.  Since reducing the thyroid supplement to 2 grains per day he has noticed his resting heart rate is now  "around 85 bpm.  He is hoping to reduce thyroid supplement to 1 grain per day and that this will bring the resting heart rate into a even lower range.    He does still experience intermittent slowness, this is most notable in the morning and described as a cognitive sensation more than a physical 1.  In the afternoons he feels well and overall is content with his progress.        ROS  Denies any recent fevers or chills. No nausea or vomiting. No chest pains or shortness of breath. All other systems reviewed and negative except as per the HPI       Objective:   Vitals obtained by patient:  /86   Pulse 87   Ht 1.778 m (5' 10\")   Wt 72.6 kg (160 lb)   BMI 22.96 kg/m²   Respirations (estimated by provider):  Temperature: Afebrile per patient subjective assessment    Physical Exam:  Speech: Normal speech pattern  Hearing: Functional in conversation  Respiratory: Non-labored breathing  Pysch: Appropriate in conversation    Allergies   Allergen Reactions   • Novocain Unspecified     Eye swelling and redness     Current medicines (including changes today)  Current Outpatient Medications   Medication Sig Dispense Refill   • thyroid (ARMOUR THYROID) 60 MG Tab Take 1 Tab by mouth every day. 30 Tab 3   • potassium chloride SA (KDUR) 20 MEQ Tab CR      • fludrocortisone (FLORINEF) 0.1 MG Tab      • PRADAXA 150 MG Cap capsule TAKE ONE CAPSULE BY MOUTH TWICE DAILY  180 Cap 0   • hydrocortisone (CORTEF) 5 MG Tab Take 30 mg by mouth every day.     • MELATONIN MAXIMUM STRENGTH PO Take 65 mg by mouth every day.     • DHEA 25 MG Tab Take 25 mg by mouth every day.     • Coenzyme Q10 (COQ-10) 100 MG Cap CR Take  by mouth.     • niacin 500 MG Tab Take 500 mg by mouth every day.     • Cholecalciferol (D-5000 PO) Take  by mouth.     • DOPamine HCl Powder 1 Dose by Does not apply route.     • Misc Natural Products (PROSTATE HEALTH PO) Take 1 Tab by mouth 2 Times a Day.     • Tyrosine Powder 2.5 g by Does not apply route 3 times a " day.     • 5-Hydroxytryptophan (5-HTP) 100 MG Cap Take 300 mg by mouth 3 times a day.     • Potassium Citrate Powder by Does not apply route.     • Cysteine 500 MG Cap Take 3,000 mg by mouth 3 times a day.       No current facility-administered medications for this visit.      Patient Active Problem List    Diagnosis Date Noted   • Leukocytosis 07/25/2020   • Weakness 07/25/2020   • Parkinson disease (HCC) 06/29/2018   • Persistent atrial fibrillation (HCC) 06/29/2018   • Pulmonary hypertension (HCC) 06/29/2018   • Elevated liver function tests 06/29/2018   • Hormone replacement therapy 06/29/2018   • Sleep apnea 06/29/2018     History reviewed. No pertinent family history.  He  has a past medical history of Hyponatremia (6/29/2018), Parkinson's disease (HCC), and Thrombocytosis ([facility]) (7/25/2020).  He  has no past surgical history on file.  Past Medical History:   Diagnosis Date   • Hyponatremia 6/29/2018   • Parkinson's disease (HCC)    • Thrombocytosis ([facility]) 7/25/2020     Allergies   Allergen Reactions   • Novocain Unspecified     Eye swelling and redness     Outpatient Encounter Medications as of 12/17/2020   Medication Sig Dispense Refill   • thyroid (ARMOUR THYROID) 60 MG Tab Take 1 Tab by mouth every day. 30 Tab 3   • potassium chloride SA (KDUR) 20 MEQ Tab CR      • fludrocortisone (FLORINEF) 0.1 MG Tab      • PRADAXA 150 MG Cap capsule TAKE ONE CAPSULE BY MOUTH TWICE DAILY  180 Cap 0   • hydrocortisone (CORTEF) 5 MG Tab Take 30 mg by mouth every day.     • MELATONIN MAXIMUM STRENGTH PO Take 65 mg by mouth every day.     • DHEA 25 MG Tab Take 25 mg by mouth every day.     • Coenzyme Q10 (COQ-10) 100 MG Cap CR Take  by mouth.     • niacin 500 MG Tab Take 500 mg by mouth every day.     • Cholecalciferol (D-5000 PO) Take  by mouth.     • DOPamine HCl Powder 1 Dose by Does not apply route.     • Misc Natural Products (PROSTATE HEALTH PO) Take 1 Tab by mouth 2 Times a Day.     • Tyrosine Powder 2.5 g by Does not  apply route 3 times a day.     • 5-Hydroxytryptophan (5-HTP) 100 MG Cap Take 300 mg by mouth 3 times a day.     • [DISCONTINUED] metoprolol SR (TOPROL XL) 50 MG TABLET SR 24 HR Take 2 Tabs by mouth every day. 90 Tab 3   • Potassium Citrate Powder by Does not apply route.     • [DISCONTINUED] Magnesium 125 MG Cap Take  by mouth.     • [DISCONTINUED] anastrozole (ARIMIDEX) 1 MG Tab Take 1 mg by mouth every day.     • [DISCONTINUED] vitamin D3, cholecalciferol, 400 UNIT Tab tablet Take 400 Units by mouth every day.     • [DISCONTINUED] vitamin e (VITAMIN E) 400 UNIT Cap Take 400 Units by mouth every day.     • [DISCONTINUED] vitamin E (VITAMIN E) 1000 UNIT Cap Take 1 Cap by mouth every day.     • Cysteine 500 MG Cap Take 3,000 mg by mouth 3 times a day.       No facility-administered encounter medications on file as of 12/17/2020.      Social History     Socioeconomic History   • Marital status: Single     Spouse name: Not on file   • Number of children: Not on file   • Years of education: Not on file   • Highest education level: Not on file   Occupational History   • Not on file   Social Needs   • Financial resource strain: Not on file   • Food insecurity     Worry: Not on file     Inability: Not on file   • Transportation needs     Medical: Not on file     Non-medical: Not on file   Tobacco Use   • Smoking status: Never Smoker   • Smokeless tobacco: Never Used   Substance and Sexual Activity   • Alcohol use: No   • Drug use: Not on file   • Sexual activity: Not on file   Lifestyle   • Physical activity     Days per week: Not on file     Minutes per session: Not on file   • Stress: Not on file   Relationships   • Social connections     Talks on phone: Not on file     Gets together: Not on file     Attends Zoroastrianism service: Not on file     Active member of club or organization: Not on file     Attends meetings of clubs or organizations: Not on file     Relationship status: Not on file   • Intimate partner violence      Fear of current or ex partner: Not on file     Emotionally abused: Not on file     Physically abused: Not on file     Forced sexual activity: Not on file   Other Topics Concern   • Not on file   Social History Narrative   • Not on file       Studies  Lab Results   Component Value Date/Time    CHOLSTRLTOT 118 11/12/2020 11:15 AM    LDL 65 11/12/2020 11:15 AM    HDL 28 (L) 11/12/2020 11:15 AM    TRIGLYCERIDE 77 11/12/2020 11:15 AM       Lab Results   Component Value Date/Time    SODIUM 141 11/12/2020 11:15 AM    POTASSIUM 3.9 11/12/2020 11:15 AM    CHLORIDE 107 11/12/2020 11:15 AM    CO2 23 11/12/2020 11:15 AM    GLUCOSE 103 (H) 11/12/2020 11:15 AM    BUN 30 (H) 11/12/2020 11:15 AM    CREATININE 0.6 (L) 11/12/2020 11:15 AM     Lab Results   Component Value Date/Time    ALKPHOSPHAT 86 11/12/2020 11:15 AM    ASTSGOT 76 (H) 11/12/2020 11:15 AM    ALTSGPT 80 (H) 11/12/2020 11:15 AM    TBILIRUBIN 1.3 11/12/2020 11:15 AM        For this encounter I directly reviewed ECG tracings I agree with the interpretations in the electronic health record    Time at end of call:  Total Time Spent: 11-20 minutes    Marciano Latif MD    This encounter was conducted via Telephone  Verbal consent was obtained. Patient's identity was verified.

## 2021-01-05 DIAGNOSIS — I48.21 PERMANENT ATRIAL FIBRILLATION (HCC): ICD-10-CM

## 2021-01-06 RX ORDER — ATENOLOL 100 MG/1
TABLET ORAL
Qty: 180 CAP | Refills: 1 | Status: SHIPPED | OUTPATIENT
Start: 2021-01-06 | End: 2021-07-08

## 2021-02-09 PROBLEM — D89.2 SERUM GAMMAGLOBULIN INCREASED: Status: ACTIVE | Noted: 2021-02-09

## 2021-04-27 PROBLEM — M81.0 AGE RELATED OSTEOPOROSIS: Status: ACTIVE | Noted: 2021-04-27

## 2021-06-15 ENCOUNTER — TELEMEDICINE (OUTPATIENT)
Dept: CARDIOLOGY | Facility: MEDICAL CENTER | Age: 81
End: 2021-06-15
Payer: MEDICARE

## 2021-06-15 VITALS
HEIGHT: 68 IN | SYSTOLIC BLOOD PRESSURE: 106 MMHG | DIASTOLIC BLOOD PRESSURE: 80 MMHG | BODY MASS INDEX: 23.49 KG/M2 | HEART RATE: 73 BPM | WEIGHT: 155 LBS

## 2021-06-15 DIAGNOSIS — G20.A1 PARKINSON DISEASE (HCC): ICD-10-CM

## 2021-06-15 DIAGNOSIS — I48.19 PERSISTENT ATRIAL FIBRILLATION (HCC): ICD-10-CM

## 2021-06-15 PROCEDURE — 99443 PR PHYSICIAN TELEPHONE EVALUATION 21-30 MIN: CPT | Mod: CR | Performed by: INTERNAL MEDICINE

## 2021-06-15 ASSESSMENT — FIBROSIS 4 INDEX: FIB4 SCORE: 1.71

## 2021-06-15 NOTE — PROGRESS NOTES
"Telephone Appointment Visit   As a means of avoiding spread of COVID-19, this visit is being conducted by telephone. This telephone visit was initiated by the patient and they verbally consented. Efforts were unsuccessful of completing the visit using video conferencing software.     Time at start of call: 11:20    Reason for Call:  Afib follow up      Assessment and Plan:   PCP: Luz Quiroga D.O.  The following treatment plan was discussed:     1. Persistent atrial fibrillation (HCC)  2. Parkinson disease (HCC)  3. Adrenal Insufficiency    Is stable from a cardiovascular stance and not interested in pursuing cardioversion at this point.  Heart rate appears to be well controlled.    Follow up: Return in about 1 year (around 6/15/2022).    Subjective:   History: Elias Casanova is a 80 y.o. male with a past medical history of parkinson's, hypertension and dyslipidemia presenting for follow up of atrial fibrillation. Since our last visit he remains orthostatic and fatigued at times. When we discussed DCCV at prior visits but he was reluctant due to the pandemic. He remains reluctant today due to other reasons. He tracks the heart rate and finds resting values between 80-90 bpm and walking average  bpm. He notices more erratic beats on his Kardia.     ROS   Denies any recent fevers or chills. No nausea or vomiting. No chest pains or shortness of breath. All other systems reviewed and negative except as per the HPI       Objective:   Vitals obtained by patient:  /80 (BP Location: Left arm, Patient Position: Sitting, BP Cuff Size: Adult)   Pulse 73   Ht 1.727 m (5' 8\")   Wt 70.3 kg (155 lb)   BMI 23.57 kg/m²   Respirations (estimated by provider):   Temperature: Afebrile per patient subjective assessment    Physical Exam:  Speech: Normal speech pattern  Hearing: Functional in conversation  Respiratory: Non-labored breathing  Pysch: Appropriate in conversation    Allergies   Allergen Reactions   • " Novocain Unspecified     Eye swelling and redness     Current medicines (including changes today)  Current Outpatient Medications   Medication Sig Dispense Refill   • thyroid (NP THYROID) 60 MG Tab Take 1 tablet by mouth every day. 90 tablet 2   • hydrocortisone (CORTEF) 5 MG Tab Take 3 tablets in the morning, 2 tabs at 11 AM, and 1 tab at 3  tablet 3   • Potassium Citrate Powder by Does not apply route.     • potassium chloride SA (KDUR) 20 MEQ Tab CR      • MELATONIN MAXIMUM STRENGTH PO Take 65 mg by mouth every day.     • DHEA 25 MG Tab Take 25 mg by mouth every day.     • Coenzyme Q10 (COQ-10) 100 MG Cap CR Take  by mouth.     • Cholecalciferol (D-5000 PO) Take  by mouth.     • DOPamine HCl Powder 1 Dose by Does not apply route.     • Misc Natural Products (PROSTATE HEALTH PO) Take 1 Tab by mouth 2 Times a Day.     • Tyrosine Powder 2.5 g by Does not apply route 3 times a day.     • Cysteine 500 MG Cap Take 3,000 mg by mouth 3 times a day.     • 5-Hydroxytryptophan (5-HTP) 100 MG Cap Take 300 mg by mouth 3 times a day.     • zoledronic Acid (RECLAST) 5 MG/100ML Solution IVPB premix Infuse 100 mL into a venous catheter Once Every 12 Months. 100 mL 0   • PRADAXA 150 MG Cap capsule TAKE ONE TABLET BY MOUTH TWICE DAILY 180 Cap 1   • fludrocortisone (FLORINEF) 0.1 MG Tab      • niacin 500 MG Tab Take 500 mg by mouth every day.       No current facility-administered medications for this visit.     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  He  has no past surgical history on file.  Past Medical History:   Diagnosis Date   • Hyponatremia 6/29/2018   • Parkinson's disease (HCC)    • Thrombocytosis (HCC) 7/25/2020     Allergies   Allergen Reactions   • Novocain Unspecified     Eye swelling and redness     Outpatient Encounter Medications as of 6/15/2021   Medication Sig Dispense Refill   • thyroid (NP THYROID) 60 MG Tab Take 1 tablet by mouth every day. 90 tablet 2   • hydrocortisone (CORTEF) 5 MG Tab Take 3 tablets in the morning, 2 tabs at 11 AM, and 1 tab at 3  tablet 3   • Potassium Citrate Powder by Does not apply route.     • potassium chloride SA (KDUR) 20 MEQ Tab CR      • MELATONIN MAXIMUM STRENGTH PO Take 65 mg by mouth every day.     • DHEA 25 MG Tab Take 25 mg by mouth every day.     • Coenzyme Q10 (COQ-10) 100 MG Cap CR Take  by mouth.     • Cholecalciferol (D-5000 PO) Take  by mouth.     • DOPamine HCl Powder 1 Dose by Does not apply route.     • Misc Natural Products (PROSTATE HEALTH PO) Take 1 Tab by mouth 2 Times a Day.     • Tyrosine Powder 2.5 g by Does not apply route 3 times a day.     • Cysteine 500 MG Cap Take 3,000 mg by mouth 3 times a day.     • 5-Hydroxytryptophan (5-HTP) 100 MG Cap Take 300 mg by mouth 3 times a day.     • zoledronic Acid (RECLAST) 5 MG/100ML Solution IVPB premix Infuse 100 mL into a venous catheter Once Every 12 Months. 100 mL 0   • PRADAXA 150 MG Cap capsule TAKE ONE TABLET BY MOUTH TWICE DAILY 180 Cap 1   • fludrocortisone (FLORINEF) 0.1 MG Tab      • niacin 500 MG Tab Take 500 mg by mouth every day.       No facility-administered encounter medications on file as of 6/15/2021.     Social History     Socioeconomic History   • Marital status: Single     Spouse name: Not on file   • Number of children: Not on file   • Years of education: Not  on file   • Highest education level: Not on file   Occupational History   • Not on file   Tobacco Use   • Smoking status: Former Smoker     Types: Cigarettes     Quit date: 3/25/1990     Years since quittin.2   • Smokeless tobacco: Never Used   Vaping Use   • Vaping Use: Never used   Substance and Sexual Activity   • Alcohol use: No     Comment: 3/25/21-quit at age 42   • Drug use: Not on file   • Sexual activity: Not on file   Other Topics Concern   • Not on file   Social History Narrative   • Not on file     Social Determinants of Health     Financial Resource Strain:    • Difficulty of Paying Living Expenses:    Food Insecurity:    • Worried About Running Out of Food in the Last Year:    • Ran Out of Food in the Last Year:    Transportation Needs:    • Lack of Transportation (Medical):    • Lack of Transportation (Non-Medical):    Physical Activity:    • Days of Exercise per Week:    • Minutes of Exercise per Session:    Stress:    • Feeling of Stress :    Social Connections:    • Frequency of Communication with Friends and Family:    • Frequency of Social Gatherings with Friends and Family:    • Attends [employer] Services:    • Active Member of Clubs or Organizations:    • Attends Club or Organization Meetings:    • Marital Status:    Intimate Partner Violence:    • Fear of Current or Ex-Partner:    • Emotionally Abused:    • Physically Abused:    • Sexually Abused:        Studies  Lab Results   Component Value Date/Time    CHOLSTRLTOT 118 2020 11:15 AM    LDL 65 2020 11:15 AM    HDL 28 (L) 2020 11:15 AM    TRIGLYCERIDE 77 2020 11:15 AM       Lab Results   Component Value Date/Time    SODIUM 141 2021 01:50 PM    POTASSIUM 4.6 2021 01:50 PM    CHLORIDE 106 2021 01:50 PM    CO2 19 (L) 2021 01:50 PM    GLUCOSE 96 2021 01:50 PM    BUN 26 (H) 2021 01:50 PM    CREATININE 0.6 (L) 2021 01:50 PM     Lab Results   Component Value Date/Time     ALKPHOSPHAT 68 02/04/2021 01:25 PM    ASTSGOT 50 (H) 02/04/2021 01:25 PM    ALTSGPT 60 02/04/2021 01:25 PM    TBILIRUBIN 1.0 02/04/2021 01:25 PM     Time at end of call: 11:50  Total Time Spent: 21-30 minutes    Marciano Latif MD    This encounter was conducted via Telephone  Verbal consent was obtained. Patient's identity was verified.

## 2021-07-08 DIAGNOSIS — I48.21 PERMANENT ATRIAL FIBRILLATION (HCC): ICD-10-CM

## 2021-07-08 RX ORDER — ATENOLOL 100 MG/1
TABLET ORAL
Qty: 180 CAPSULE | Refills: 3 | Status: SHIPPED | OUTPATIENT
Start: 2021-07-08 | End: 2022-07-14

## 2021-07-15 PROBLEM — E27.40 ADRENAL INSUFFICIENCY (HCC): Status: ACTIVE | Noted: 2021-07-15

## 2021-12-02 ENCOUNTER — TELEPHONE (OUTPATIENT)
Dept: CARDIOLOGY | Facility: MEDICAL CENTER | Age: 81
End: 2021-12-02

## 2021-12-02 ENCOUNTER — TELEMEDICINE (OUTPATIENT)
Dept: CARDIOLOGY | Facility: MEDICAL CENTER | Age: 81
End: 2021-12-02
Payer: MEDICARE

## 2021-12-02 VITALS — HEART RATE: 80 BPM | BODY MASS INDEX: 24.25 KG/M2 | WEIGHT: 160 LBS | HEIGHT: 68 IN | OXYGEN SATURATION: 96 %

## 2021-12-02 DIAGNOSIS — I48.21 PERMANENT ATRIAL FIBRILLATION (HCC): ICD-10-CM

## 2021-12-02 DIAGNOSIS — E05.90 HYPERTHYROIDISM: ICD-10-CM

## 2021-12-02 PROCEDURE — 99212 OFFICE O/P EST SF 10 MIN: CPT | Performed by: NURSE PRACTITIONER

## 2021-12-02 RX ORDER — ROTIGOTINE 4 MG/24H
1 PATCH, EXTENDED RELEASE TRANSDERMAL DAILY
COMMUNITY
Start: 2021-10-27

## 2021-12-02 ASSESSMENT — FIBROSIS 4 INDEX: FIB4 SCORE: 1.89

## 2021-12-02 NOTE — TELEPHONE ENCOUNTER
BE    Pt called and wanted to discuss restarting a Heart Rate medication that BE prescribed to lower his HR but can not remember the name. Pt states that he has been having some issues and would like a call back to further discuss at [phone].    Thank you

## 2021-12-02 NOTE — PROGRESS NOTES
Telephone Appointment Visit   As a means of avoiding spread of COVID-19, this visit is being conducted by telephone. This telephone visit was initiated by the patient and they verbally consented.    Time at start of call: 1537    Reason for Call:  New Symptom/ Concern: A fib    HPI:    Elias Casanova is a 81 y.o. male presenting past medical history of Parkinson's disease presenting for follow up of atrial fibrillation.  He recently converted from paroxysmal to persistent atrial fibrillation and around that time began experiencing a feeling of slowness.  When I last saw him he was in rapid ventricular response and metoprolol was added.  He subsequently completed an echocardiogram which showed ongoing atrial fibrillation with rapid ventricular response.  When we called to explore whether more metoprolol might be indicated the patient informed us he was experiencing symptomatic hypotension as a consequence of the 50 mg dosage.  And instead he had discontinued the metoprolol altogether.  He did direct his efforts towards reducing thyroid supplementation as he was identified as having iatrogenic hyper thyroid.  Since reducing the thyroid supplement to 2 grains per day he has noticed his resting heart rate is now around 85 bpm.  He is hoping to reduce thyroid supplement to 1 grain per day and that this will bring the resting heart rate into a even lower range.     Patient reports an increase in palpitations and that he is interested in starting a medication.  He is previously on metoprolol 50 mg twice daily but had issues with hypotension.  We will start him on 12.5 mg twice daily.  We will go ahead and obtain ECG and echo and have patient follow-up    Labs / Images Reviewed:     Lab Results   Component Value Date/Time    SODIUM 136 (L) 09/22/2021 02:55 PM    POTASSIUM 4.4 09/22/2021 02:55 PM    CHLORIDE 103 09/22/2021 02:55 PM    CO2 14 (L) 09/22/2021 02:55 PM    GLUCOSE 94 09/22/2021 02:55 PM    BUN 33 (H) 09/22/2021  02:55 PM    CREATININE 0.7 09/22/2021 02:55 PM         Assessment and Plan:     1. Permanent atrial fibrillation (HCC)    2. Hyperthyroidism    Other orders  - metoprolol tartrate (LOPRESSOR) 25 MG Tab; Take 0.5 Tablets by mouth 2 times a day.  Dispense: 30 Tablet; Refill: 11  -Echo and ECG, orders to be sent to [facility] in [place].    Follow-up: 2-3 months with echocardiogram    Time at end of call: 1839  Total Time Spent: 5-10 minutes    Courtney Valadez, A.P.N.

## 2021-12-03 NOTE — TELEPHONE ENCOUNTER
Returned call. Pt states he is all set, no further concerns. He was just returning Emily's call earlier but then he saw ARIS.

## 2021-12-03 NOTE — TELEPHONE ENCOUNTER
ARIS    Pt would like a call back to discuss some further issues.     jim - [phone]    Thank you,   Margi GODINEZ

## 2021-12-13 ENCOUNTER — TELEPHONE (OUTPATIENT)
Dept: SCHEDULING | Facility: IMAGING CENTER | Age: 81
End: 2021-12-13

## 2021-12-13 NOTE — TELEPHONE ENCOUNTER
Returned call. Pt states that he feels the 12.5 mg BID of Metoprolol was not controlling his HR enough. He reports that his heart rate would go from 80 to over 100 w/ just walking from him living room to his kitchen and he would feel SOB, have a hard time standing, and needed to lie down for a few hours. BP was usually about 120/80.     Pt states that he increased his Metoprolol to 25 mg BID for the last 4 days and feels that this is working better for him. His HR stays below 100, BP around 100/70, and he is feeling better. He'd like to know if DB is ok w/ this and if a new prescription can be sent in for Metoprolol 25 mg BID. Informed pt that I will ask DB and that she will be out of the office until Thursday.     To DB, please let me know if pt can stay on Metoprolol 25 BID?

## 2021-12-13 NOTE — TELEPHONE ENCOUNTER
DB-    Pt called regarding clarification on his dosages for his medications and was asking if the order for the EKG was sent in yet so it could be scheduled.  He requested a call back to discuss further.      Thank you

## 2021-12-17 NOTE — TELEPHONE ENCOUNTER
ALEXSANDRA Pierre.  You 1 minute ago (4:39 PM)     Okay to stay on metoprolol 25 mg twice daily.  Thank you, Akosua    Message text      Rx sent. Pt notified.

## 2022-06-07 ENCOUNTER — TELEPHONE (OUTPATIENT)
Dept: CARDIOLOGY | Facility: MEDICAL CENTER | Age: 82
End: 2022-06-07
Payer: MEDICARE

## 2022-06-07 NOTE — TELEPHONE ENCOUNTER
DB    Caller: Elias Casanova    Topic/issue:Order for the Echo needs to be sent to [facility] # [phone]  Callback Number: [phone] (home)     Thank you,  Emerald DE LA TORRE

## 2022-07-01 PROBLEM — R79.89 ELEVATED LIVER FUNCTION TESTS: Status: RESOLVED | Noted: 2018-06-29 | Resolved: 2022-07-01

## 2022-07-14 DIAGNOSIS — I48.21 PERMANENT ATRIAL FIBRILLATION (HCC): ICD-10-CM

## 2022-07-14 RX ORDER — ATENOLOL 100 MG/1
TABLET ORAL
Qty: 180 CAPSULE | Refills: 1 | Status: SHIPPED | OUTPATIENT
Start: 2022-07-14 | End: 2023-02-02

## 2022-07-14 NOTE — TELEPHONE ENCOUNTER
Is the patient due for a refill? Yes    Was the patient seen the past year? Yes    Date of last office visit: 12/2/2021    Does the patient have an upcoming appointment?  Yes   If yes, When? 8/8/2022    Provider to refill: Abdirizak, Okay to fill BE-ADD    Does the patients insurance require a 100 day supply? No

## 2022-08-08 ENCOUNTER — TELEMEDICINE (OUTPATIENT)
Dept: CARDIOLOGY | Facility: MEDICAL CENTER | Age: 82
End: 2022-08-08
Payer: MEDICARE

## 2022-08-08 VITALS — DIASTOLIC BLOOD PRESSURE: 69 MMHG | SYSTOLIC BLOOD PRESSURE: 89 MMHG | HEART RATE: 47 BPM

## 2022-08-08 DIAGNOSIS — E27.40 ADRENAL INSUFFICIENCY (HCC): ICD-10-CM

## 2022-08-08 DIAGNOSIS — I48.19 PERSISTENT ATRIAL FIBRILLATION (HCC): ICD-10-CM

## 2022-08-08 DIAGNOSIS — G20.A1 PARKINSON'S DISEASE (HCC): ICD-10-CM

## 2022-08-08 PROCEDURE — 99214 OFFICE O/P EST MOD 30 MIN: CPT | Mod: 95 | Performed by: INTERNAL MEDICINE

## 2022-08-08 RX ORDER — LEVOTHYROXINE, LIOTHYRONINE 38; 9 UG/1; UG/1
60 TABLET ORAL DAILY
COMMUNITY
Start: 2022-06-16 | End: 2022-08-16 | Stop reason: SDUPTHER

## 2022-08-08 NOTE — PROGRESS NOTES
Cardiology Telemedicine Visit: Established Patient   This encounter was conducted via  [employer] .   Verbal consent was obtained. Patient's identity was verified.  Patient location: Home in [place]    Assessment and Plan:   PCP: Luz Quiroga D.O.  The following treatment plan was discussed:     1. Persistent atrial fibrillation (HCC)    2. Parkinson's disease (HCC)    3. Adrenal insufficiency (HCC)-chronic steroid use, followed by telemedicine endocrinology        Elias Casanova is stable from a cardiovascular standpoint with no symptoms attributable to atrial fibrillation.  Although he inquires about rhythm control, I believe it is in his best interest to maintain the current rate control strategy, particularly in light of the normal left ventricular function.  We did discuss the possibility of pursuing a cardioversion/trial of rhythm control but ultimately agreed to this course of action.  He will remain on anticoagulation    Follow up: 3 months at his request    Subjective:     Chief Complaint   Patient presents with    Atrial Fibrillation     F/V Dx: Persistent atrial fibrillation (HCC)     History: Elias Casanova is a 81 y.o. male with parkinson's, hypertension and dyslipidemia presenting for follow up of atrial fibrillation.He reports his atrial fibrillation has been under good control with Hrs recently in the 70s and 80s. He remains under the care of a homeopath for his parkinson's and he is quite happy with the progress. He is mostly home bound but did get out to a restaurant recently. He inquires about the utility of a cardioversion or ablation      ROS   Denies any recent fevers or chills. No nausea or vomiting. No chest pains or shortness of breath. All other systems reviewed and negative except as per the HPI       Objective:   Vitals obtained by patient:  Respirations through observation: 12  BP (!) 89/69 (BP Location: Left arm, Patient Position: Sitting, BP Cuff Size: Adult)   Pulse (!) 47      Physical Exam:  Constitutional: Alert, no distress, well-groomed.  Skin: No rashes in visible areas.  Eye: Round. Conjunctiva clear, lids normal. No icterus.   ENMT: Lips pink without lesions, good dentition, moist mucous membranes. Phonation normal.  Neck: No masses, no thyromegaly. Moves freely without pain.  CV: Pulse as reported by patient  Respiratory: Unlabored respiratory effort, no cough or audible wheeze  Psych: Alert and oriented x3, normal affect and mood.     The ASCVD Risk score ([place] CHRISTINA Jr, et al., 2013) failed to calculate.  Allergies   Allergen Reactions    Novocain Unspecified     Eye swelling and redness     Current medicines (including changes today)  Current Outpatient Medications   Medication Sig Dispense Refill    NP THYROID 60 MG Tab Take 60 mg by mouth every day.      hydrocortisone (CORTEF) 5 MG Tab Take two tablets in the morning upon awakening. Take 1 tablet at noon and Take 1/2 tablet at 4 pm. 360 Tablet 3    PRADAXA 150 MG Cap capsule TAKE ONE CAPSULE BY MOUTH TWICE DAILY 180 Capsule 1    thyroid 65 MG tablet Take 1 Tablet by mouth every day. 90 Tablet 0    NEUPRO 4 MG/24HR PATCH 24 HR       potassium chloride SA (KDUR) 20 MEQ Tab CR       MELATONIN MAXIMUM STRENGTH PO Take 65 mg by mouth every day.      DHEA 25 MG Tab Take 25 mg by mouth every day.      Coenzyme Q10 (COQ-10) 100 MG Cap CR Take  by mouth.      niacin 500 MG Tab Take 500 mg by mouth every day.      Cholecalciferol (D-5000 PO) Take  by mouth.      DOPamine HCl Powder 1 Dose by Does not apply route.      Misc Natural Products (PROSTATE HEALTH PO) Take 1 Tab by mouth 2 Times a Day.      Tyrosine Powder 2.5 g by Does not apply route 3 times a day.      Cysteine 500 MG Cap Take 3,000 mg by mouth 3 times a day.       No current facility-administered medications for this visit.     Patient Active Problem List    Diagnosis Date Noted    Adrenal insufficiency (HCC)-chronic steroid use, followed by telemedicine endocrinology  07/15/2021    Age related osteoporosis-declined further treatment with Reclast.  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  He  has no past surgical history on file.  Past Medical History:   Diagnosis Date    Elevated liver function tests 6/29/2018    Hyponatremia 6/29/2018    Parkinson's disease (HCC)     Thrombocytosis 7/25/2020     Allergies   Allergen Reactions    Novocain Unspecified     Eye swelling and redness     Outpatient Encounter Medications as of 8/8/2022   Medication Sig Dispense Refill    NP THYROID 60 MG Tab Take 60 mg by mouth every day.      hydrocortisone (CORTEF) 5 MG Tab Take two tablets in the morning upon awakening. Take 1 tablet at noon and Take 1/2 tablet at 4 pm. 360 Tablet 3    PRADAXA 150 MG Cap capsule TAKE ONE CAPSULE BY MOUTH TWICE DAILY 180 Capsule 1    thyroid 65 MG tablet Take 1 Tablet by mouth every day. 90 Tablet 0    NEUPRO 4 MG/24HR PATCH 24 HR       potassium chloride SA (KDUR) 20 MEQ Tab CR       MELATONIN MAXIMUM STRENGTH PO Take 65 mg by mouth every day.      DHEA 25 MG Tab Take 25 mg by mouth every day.      Coenzyme Q10 (COQ-10) 100 MG Cap CR Take  by mouth.      niacin 500 MG Tab Take 500 mg by mouth every day.      Cholecalciferol (D-5000 PO) Take  by mouth.      DOPamine HCl Powder 1 Dose by Does not apply route.      Misc Natural Products (PROSTATE HEALTH PO) Take 1 Tab by mouth 2 Times a Day.       Tyrosine Powder 2.5 g by Does not apply route 3 times a day.      Cysteine 500 MG Cap Take 3,000 mg by mouth 3 times a day.      [DISCONTINUED] zoledronic Acid (RECLAST) 5 MG/100ML Solution IVPB premix Infuse 100 mL into a venous catheter Once Every 12 Months. (Patient not taking: No sig reported) 100 mL 0    [DISCONTINUED] 5-Hydroxytryptophan (5-HTP) 100 MG Cap Take 300 mg by mouth 3 times a day. (Patient not taking: No sig reported)       No facility-administered encounter medications on file as of 2022.     Social History     Socioeconomic History    Marital status: Single     Spouse name: Not on file    Number of children: Not on file    Years of education: Not on file    Highest education level: Not on file   Occupational History    Not on file   Tobacco Use    Smoking status: Former Smoker     Types: Cigarettes     Quit date: 3/25/1990     Years since quittin.3    Smokeless tobacco: Never Used   Vaping Use    Vaping Use: Never used   Substance and Sexual Activity    Alcohol use: No     Comment: 3/25/21-quit at age 42    Drug use: Never    Sexual activity: Not on file   Other Topics Concern    Not on file   Social History Narrative    Not on file     Social Determinants of Health     Financial Resource Strain: Not on file   Food Insecurity: Not on file   Transportation Needs: Not on file   Physical Activity: Not on file   Stress: Not on file   Social Connections: Not on file   Intimate Partner Violence: Not on file   Housing Stability: Not on file       Studies  Lab Results   Component Value Date/Time    CHOLSTRLTOT 131 06/15/2022 11:51 AM    LDL 74 06/15/2022 11:51 AM    HDL 45 06/15/2022 11:51 AM    TRIGLYCERIDE 58 06/15/2022 11:51 AM       Lab Results   Component Value Date/Time    SODIUM 137 2022 10:00 AM    POTASSIUM 4.5 2022 10:00 AM    CHLORIDE 108 (H) 2022 10:00 AM    CO2 17 (L) 2022 10:00 AM    GLUCOSE 70 2022 10:00 AM    BUN 18 2022 10:00 AM    CREATININE  0.8 07/30/2022 10:00 AM     Lab Results   Component Value Date/Time    ALKPHOSPHAT 95 06/15/2022 11:51 AM    ASTSGOT 44 06/15/2022 11:51 AM    ALTSGPT 39 06/15/2022 11:51 AM    TBILIRUBIN 0.6 06/15/2022 11:51 AM        For this encounter I reviewed the following medical records BMP, Lipid profile and CBC and echo results.

## 2022-12-21 ENCOUNTER — APPOINTMENT (OUTPATIENT)
Dept: CARDIOLOGY | Facility: MEDICAL CENTER | Age: 82
End: 2022-12-21
Payer: MEDICARE

## 2022-12-29 ENCOUNTER — TELEPHONE (OUTPATIENT)
Dept: CARDIOLOGY | Facility: PHYSICIAN GROUP | Age: 82
End: 2022-12-29
Payer: MEDICARE

## 2022-12-29 NOTE — TELEPHONE ENCOUNTER
Called patient in regards to obtaining records for NP appointment with DS. Unable to confirm if patient has ever been treated by a previous Electrophysiologist.     Line was picked up and disconnected.

## 2023-02-01 DIAGNOSIS — I48.21 PERMANENT ATRIAL FIBRILLATION (HCC): ICD-10-CM

## 2023-02-02 RX ORDER — DABIGATRAN ETEXILATE 150 MG/1
CAPSULE ORAL
Qty: 180 CAPSULE | Refills: 0 | Status: SHIPPED | OUTPATIENT
Start: 2023-02-02 | End: 2023-06-14

## 2023-04-05 ENCOUNTER — OFFICE VISIT (OUTPATIENT)
Dept: CARDIOLOGY | Facility: MEDICAL CENTER | Age: 83
End: 2023-04-05
Attending: FAMILY MEDICINE
Payer: MEDICARE

## 2023-04-05 VITALS
BODY MASS INDEX: 21.77 KG/M2 | OXYGEN SATURATION: 98 % | RESPIRATION RATE: 18 BRPM | DIASTOLIC BLOOD PRESSURE: 64 MMHG | WEIGHT: 147 LBS | HEIGHT: 69 IN | HEART RATE: 60 BPM | SYSTOLIC BLOOD PRESSURE: 94 MMHG

## 2023-04-05 DIAGNOSIS — I27.20 PULMONARY HYPERTENSION (HCC): ICD-10-CM

## 2023-04-05 DIAGNOSIS — I48.19 PERSISTENT ATRIAL FIBRILLATION (HCC): ICD-10-CM

## 2023-04-05 DIAGNOSIS — G20.A1 PARKINSON DISEASE (HCC): ICD-10-CM

## 2023-04-05 DIAGNOSIS — Z79.01 ANTICOAGULATED: ICD-10-CM

## 2023-04-05 DIAGNOSIS — G47.30 SLEEP APNEA, UNSPECIFIED TYPE: ICD-10-CM

## 2023-04-05 LAB — EKG IMPRESSION: NORMAL

## 2023-04-05 PROCEDURE — 99214 OFFICE O/P EST MOD 30 MIN: CPT | Performed by: INTERNAL MEDICINE

## 2023-04-05 PROCEDURE — 93000 ELECTROCARDIOGRAM COMPLETE: CPT | Performed by: INTERNAL MEDICINE

## 2023-04-05 RX ORDER — METOPROLOL SUCCINATE 25 MG/1
12.5 TABLET, EXTENDED RELEASE ORAL DAILY
Qty: 30 TABLET | Refills: 3 | Status: SHIPPED | OUTPATIENT
Start: 2023-04-05 | End: 2023-12-01 | Stop reason: SDUPTHER

## 2023-04-05 RX ORDER — POTASSIUM CHLORIDE 20 MEQ/1
20 TABLET, EXTENDED RELEASE ORAL DAILY
COMMUNITY
Start: 2023-03-10 | End: 2023-12-01

## 2023-04-05 ASSESSMENT — FIBROSIS 4 INDEX: FIB4 SCORE: 1.77

## 2023-04-05 NOTE — PROGRESS NOTES
Chief Complaint   Patient presents with    New Patient     NP Dx:Persistent atrial fibrillation       Subjective     Edward Casanova is a 82 y.o. male who presents today being seen secondary to persistent atrial fibrillation not too troublesome.  Previously seen Dr. Latif with appropriate treatment.  On Pradaxa.  Parkinson's.  Lives in [place].  Previous  at [facility].  Just here for routine follow-up.  No difficulty with bleeding.  Not on rate control.  Occasionally feels his heart race at night.    Past Medical History:   Diagnosis Date    Elevated liver function tests 2018    Hyponatremia 2018    Parkinson's disease (HCC)     Thrombocytosis 2020     History reviewed. No pertinent surgical history.  Family History   Problem Relation Age of Onset    Other Mother 88        hip fx     Social History     Socioeconomic History    Marital status: Single     Spouse name: Not on file    Number of children: Not on file    Years of education: Not on file    Highest education level: Bachelor's degree (e.g., BA, AB, BS)   Occupational History    Not on file   Tobacco Use    Smoking status: Former     Types: Cigarettes     Quit date: 3/25/1990     Years since quittin.0    Smokeless tobacco: Never   Vaping Use    Vaping Use: Never used   Substance and Sexual Activity    Alcohol use: No     Comment: 3/25/21-quit at age 42    Drug use: Never    Sexual activity: Not on file   Other Topics Concern    Not on file   Social History Narrative    Not on file     Social Determinants of Health     Financial Resource Strain: Not on file   Food Insecurity: No Food Insecurity    Worried About Running Out of Food in the Last Year: Never true    Ran Out of Food in the Last Year: Never true   Transportation Needs: No Transportation Needs    Lack of Transportation (Medical): No    Lack of Transportation (Non-Medical): No   Physical Activity: Inactive    Days of Exercise per Week: 0 days     "Minutes of Exercise per Session: 0 min   Stress: No Stress Concern Present    Feeling of Stress : Only a little   Social Connections: Unknown    Frequency of Communication with Friends and Family: More than three times a week    Frequency of Social Gatherings with Friends and Family: Once a week    Attends Mu-ism Services: Never    Active Member of Clubs or Organizations: Not on file    Attends Club or Organization Meetings: Not on file    Marital Status: Living with partner   Intimate Partner Violence: Not on file   Housing Stability: Not on file     Allergies   Allergen Reactions    Novocain Unspecified     Eye swelling and redness     Outpatient Encounter Medications as of 4/5/2023   Medication Sig Dispense Refill    potassium chloride SA (KDUR) 20 MEQ Tab CR every day.      metoprolol SR (TOPROL XL) 25 MG TABLET SR 24 HR Take 0.5 Tablets by mouth every day. 30 Tablet 3    predniSONE (DELTASONE) 5 MG Tab Take 1 tab once daily po 90 Tablet 1    dabigatran (PRADAXA) 150 MG Cap capsule TAKE ONE CAPSULE BY MOUTH TWICE DAILY 180 Capsule 0    NP THYROID 60 MG Tab 1 tab po q am M-sat, 2 tabs PO on Sundays only 120 Tablet 3    NEUPRO 4 MG/24HR PATCH 24 HR       MELATONIN MAXIMUM STRENGTH PO Take 65 mg by mouth every day.      DHEA 25 MG Tab Take 1 Tablet by mouth every day.      Coenzyme Q10 (COQ-10) 100 MG Cap CR Take  by mouth.      Cholecalciferol (D-5000 PO) Take  by mouth.      DOPamine HCl Powder 1 Dose by Does not apply route.      Tyrosine Powder 2.5 g by Does not apply route 3 times a day.      Cysteine 500 MG Cap Take 6 Capsules by mouth 3 times a day.      Misc Natural Products (PROSTATE HEALTH PO) Take 1 Tab by mouth 2 Times a Day. (Patient not taking: Reported on 4/5/2023)       No facility-administered encounter medications on file as of 4/5/2023.     ROS           Objective     BP 94/64 (BP Location: Left arm, Patient Position: Sitting, BP Cuff Size: Adult)   Pulse 60   Resp 18   Ht 1.753 m (5' 9\")  "  Wt 66.7 kg (147 lb)   SpO2 98%   BMI 21.71 kg/m²     Physical Exam  Constitutional:       Appearance: He is well-developed.   HENT:      Head: Normocephalic and atraumatic.   Cardiovascular:      Rate and Rhythm: Normal rate. Rhythm irregular.      Heart sounds: No murmur heard.    No friction rub. No gallop.   Pulmonary:      Effort: Pulmonary effort is normal.      Breath sounds: Normal breath sounds.   Abdominal:      Palpations: Abdomen is soft.   Musculoskeletal:         General: Normal range of motion.      Cervical back: Normal range of motion and neck supple.   Skin:     General: Skin is warm and dry.   Neurological:      Mental Status: He is alert and oriented to person, place, and time.      Comments: Parkinson's tremor   Psychiatric:         Behavior: Behavior normal.         Thought Content: Thought content normal.         Judgment: Judgment normal.              Assessment & Plan     1. Persistent atrial fibrillation (HCC)  EKG      2. Pulmonary hypertension (HCC)        3. Sleep apnea, unspecified type        4. Anticoagulated        5. Parkinson disease (HCC)            Medical Decision Making: Today's Assessment/Status/Plan:   1.  Persistent atrial fibrillation.  Continue current treatment.  Can try low-dose beta-blockers for nocturnal palpitations.  2.  Anticoagulation continue Pradaxa.  3.  Parkinson disease on treatment.  4.  Follow-up with me in 1 year.

## 2023-06-13 DIAGNOSIS — I48.21 PERMANENT ATRIAL FIBRILLATION (HCC): ICD-10-CM

## 2023-06-14 RX ORDER — ATENOLOL 100 MG/1
TABLET ORAL
Qty: 180 CAPSULE | Refills: 3 | Status: ON HOLD | OUTPATIENT
Start: 2023-06-14 | End: 2023-11-10

## 2023-10-03 PROBLEM — I27.20 PULMONARY HYPERTENSION (HCC): Chronic | Status: ACTIVE | Noted: 2018-06-29

## 2023-10-03 PROBLEM — E27.1 PRIMARY ADRENAL INSUFFICIENCY (HCC): Status: ACTIVE | Noted: 2023-10-03

## 2023-10-03 PROBLEM — I48.19 PERSISTENT ATRIAL FIBRILLATION (HCC): Chronic | Status: ACTIVE | Noted: 2018-06-29

## 2023-10-03 PROBLEM — G20.A1 PARKINSON DISEASE (HCC): Chronic | Status: ACTIVE | Noted: 2018-06-29

## 2023-10-03 PROBLEM — E27.1 PRIMARY ADRENAL INSUFFICIENCY (HCC): Chronic | Status: ACTIVE | Noted: 2023-10-03

## 2023-10-03 PROBLEM — D89.2 SERUM GAMMAGLOBULIN INCREASED: Chronic | Status: ACTIVE | Noted: 2021-02-09

## 2023-10-26 ENCOUNTER — HOSPITAL ENCOUNTER (INPATIENT)
Facility: MEDICAL CENTER | Age: 83
LOS: 15 days | DRG: 280 | End: 2023-11-10
Attending: RADIOLOGY | Admitting: STUDENT IN AN ORGANIZED HEALTH CARE EDUCATION/TRAINING PROGRAM
Payer: MEDICARE

## 2023-10-26 DIAGNOSIS — I21.4 NSTEMI (NON-ST ELEVATED MYOCARDIAL INFARCTION) (HCC): ICD-10-CM

## 2023-10-26 DIAGNOSIS — K92.2 GASTROINTESTINAL HEMORRHAGE, UNSPECIFIED GASTROINTESTINAL HEMORRHAGE TYPE: ICD-10-CM

## 2023-10-26 DIAGNOSIS — I48.19 PERSISTENT ATRIAL FIBRILLATION (HCC): Chronic | ICD-10-CM

## 2023-10-26 DIAGNOSIS — R06.00 DYSPNEA, UNSPECIFIED TYPE: ICD-10-CM

## 2023-10-26 DIAGNOSIS — I95.9 HYPOTENSION, UNSPECIFIED HYPOTENSION TYPE: ICD-10-CM

## 2023-10-26 DIAGNOSIS — N40.0 BENIGN PROSTATIC HYPERPLASIA, UNSPECIFIED WHETHER LOWER URINARY TRACT SYMPTOMS PRESENT: ICD-10-CM

## 2023-10-26 PROBLEM — R79.89 ELEVATED TROPONIN: Status: ACTIVE | Noted: 2023-10-26

## 2023-10-26 PROCEDURE — 770020 HCHG ROOM/CARE - TELE (206)

## 2023-10-26 PROCEDURE — 99291 CRITICAL CARE FIRST HOUR: CPT | Performed by: STUDENT IN AN ORGANIZED HEALTH CARE EDUCATION/TRAINING PROGRAM

## 2023-10-26 RX ORDER — AMOXICILLIN 250 MG
2 CAPSULE ORAL 2 TIMES DAILY
Status: DISCONTINUED | OUTPATIENT
Start: 2023-10-27 | End: 2023-11-01

## 2023-10-26 RX ORDER — SODIUM CHLORIDE 9 MG/ML
INJECTION, SOLUTION INTRAVENOUS CONTINUOUS
Status: DISCONTINUED | OUTPATIENT
Start: 2023-10-27 | End: 2023-10-28

## 2023-10-26 RX ORDER — BISACODYL 10 MG
10 SUPPOSITORY, RECTAL RECTAL
Status: DISCONTINUED | OUTPATIENT
Start: 2023-10-26 | End: 2023-11-01

## 2023-10-26 RX ORDER — POLYETHYLENE GLYCOL 3350 17 G/17G
1 POWDER, FOR SOLUTION ORAL
Status: DISCONTINUED | OUTPATIENT
Start: 2023-10-26 | End: 2023-11-01

## 2023-10-26 RX ORDER — ENOXAPARIN SODIUM 100 MG/ML
40 INJECTION SUBCUTANEOUS DAILY
Status: DISCONTINUED | OUTPATIENT
Start: 2023-10-27 | End: 2023-10-27

## 2023-10-26 RX ORDER — ACETAMINOPHEN 325 MG/1
650 TABLET ORAL EVERY 6 HOURS PRN
Status: DISCONTINUED | OUTPATIENT
Start: 2023-10-26 | End: 2023-11-10 | Stop reason: HOSPADM

## 2023-10-26 ASSESSMENT — PATIENT HEALTH QUESTIONNAIRE - PHQ9
SUM OF ALL RESPONSES TO PHQ9 QUESTIONS 1 AND 2: 0
1. LITTLE INTEREST OR PLEASURE IN DOING THINGS: NOT AT ALL
2. FEELING DOWN, DEPRESSED, IRRITABLE, OR HOPELESS: NOT AT ALL

## 2023-10-26 ASSESSMENT — COGNITIVE AND FUNCTIONAL STATUS - GENERAL
DRESSING REGULAR UPPER BODY CLOTHING: A LOT
SUGGESTED CMS G CODE MODIFIER DAILY ACTIVITY: CL
MOVING TO AND FROM BED TO CHAIR: A LOT
TURNING FROM BACK TO SIDE WHILE IN FLAT BAD: A LITTLE
DRESSING REGULAR LOWER BODY CLOTHING: A LOT
EATING MEALS: A LITTLE
MOVING FROM LYING ON BACK TO SITTING ON SIDE OF FLAT BED: A LOT
PERSONAL GROOMING: A LOT
WALKING IN HOSPITAL ROOM: A LOT
CLIMB 3 TO 5 STEPS WITH RAILING: A LOT
STANDING UP FROM CHAIR USING ARMS: A LOT
MOBILITY SCORE: 13
TOILETING: A LOT
HELP NEEDED FOR BATHING: A LOT
SUGGESTED CMS G CODE MODIFIER MOBILITY: CL
DAILY ACTIVITIY SCORE: 13

## 2023-10-26 ASSESSMENT — LIFESTYLE VARIABLES
TOTAL SCORE: 0
EVER HAD A DRINK FIRST THING IN THE MORNING TO STEADY YOUR NERVES TO GET RID OF A HANGOVER: NO
TOTAL SCORE: 0
EVER FELT BAD OR GUILTY ABOUT YOUR DRINKING: NO
TOTAL SCORE: 0
ALCOHOL_USE: NO
HAVE YOU EVER FELT YOU SHOULD CUT DOWN ON YOUR DRINKING: NO
AVERAGE NUMBER OF DAYS PER WEEK YOU HAVE A DRINK CONTAINING ALCOHOL: 0
CONSUMPTION TOTAL: NEGATIVE
ON A TYPICAL DAY WHEN YOU DRINK ALCOHOL HOW MANY DRINKS DO YOU HAVE: 0
DOES PATIENT WANT TO STOP DRINKING: NO
HAVE PEOPLE ANNOYED YOU BY CRITICIZING YOUR DRINKING: NO
HOW MANY TIMES IN THE PAST YEAR HAVE YOU HAD 5 OR MORE DRINKS IN A DAY: 0

## 2023-10-27 ENCOUNTER — APPOINTMENT (OUTPATIENT)
Dept: CARDIOLOGY | Facility: MEDICAL CENTER | Age: 83
DRG: 280 | End: 2023-10-27
Attending: INTERNAL MEDICINE
Payer: MEDICARE

## 2023-10-27 PROBLEM — I21.4 NSTEMI (NON-ST ELEVATED MYOCARDIAL INFARCTION) (HCC): Status: ACTIVE | Noted: 2023-10-26

## 2023-10-27 PROBLEM — M62.82 NON-TRAUMATIC RHABDOMYOLYSIS: Status: ACTIVE | Noted: 2023-10-27

## 2023-10-27 PROBLEM — D64.9 ANEMIA: Status: ACTIVE | Noted: 2023-10-27

## 2023-10-27 PROBLEM — Z71.89 ADVANCE CARE PLANNING: Status: ACTIVE | Noted: 2023-10-27

## 2023-10-27 LAB
ALBUMIN SERPL BCP-MCNC: 3.4 G/DL (ref 3.2–4.9)
ALBUMIN/GLOB SERPL: 1.5 G/DL
ALP SERPL-CCNC: 96 U/L (ref 30–99)
ALT SERPL-CCNC: 45 U/L (ref 2–50)
ANION GAP SERPL CALC-SCNC: 14 MMOL/L (ref 7–16)
APTT PPP: 74.5 SEC (ref 24.7–36)
AST SERPL-CCNC: 115 U/L (ref 12–45)
BILIRUB SERPL-MCNC: 0.3 MG/DL (ref 0.1–1.5)
BUN SERPL-MCNC: 37 MG/DL (ref 8–22)
CALCIUM ALBUM COR SERPL-MCNC: 9 MG/DL (ref 8.5–10.5)
CALCIUM SERPL-MCNC: 8.5 MG/DL (ref 8.5–10.5)
CHLORIDE SERPL-SCNC: 109 MMOL/L (ref 96–112)
CHOLEST SERPL-MCNC: 95 MG/DL (ref 100–199)
CK SERPL-CCNC: 1595 U/L (ref 0–154)
CO2 SERPL-SCNC: 17 MMOL/L (ref 20–33)
CREAT SERPL-MCNC: 1.26 MG/DL (ref 0.5–1.4)
EKG IMPRESSION: NORMAL
ERYTHROCYTE [DISTWIDTH] IN BLOOD BY AUTOMATED COUNT: 54.2 FL (ref 35.9–50)
EST. AVERAGE GLUCOSE BLD GHB EST-MCNC: 111 MG/DL
GFR SERPLBLD CREATININE-BSD FMLA CKD-EPI: 57 ML/MIN/1.73 M 2
GLOBULIN SER CALC-MCNC: 2.2 G/DL (ref 1.9–3.5)
GLUCOSE SERPL-MCNC: 89 MG/DL (ref 65–99)
HBA1C MFR BLD: 5.5 % (ref 4–5.6)
HCT VFR BLD AUTO: 27.2 % (ref 42–52)
HCT VFR BLD AUTO: 32.6 % (ref 42–52)
HDLC SERPL-MCNC: 32 MG/DL
HGB BLD-MCNC: 8 G/DL (ref 14–18)
HGB BLD-MCNC: 9.5 G/DL (ref 14–18)
HGB RETIC QN AUTO: 22 PG/CELL (ref 29–35)
IMM RETICS NFR: 27.2 % (ref 2.6–16.1)
INR PPP: 2.02 (ref 0.87–1.13)
LACTATE SERPL-SCNC: 0.7 MMOL/L (ref 0.5–2)
LDLC SERPL CALC-MCNC: 59 MG/DL
MCH RBC QN AUTO: 27.5 PG (ref 27–33)
MCHC RBC AUTO-ENTMCNC: 29.4 G/DL (ref 32.3–36.5)
MCV RBC AUTO: 93.5 FL (ref 81.4–97.8)
PLATELET # BLD AUTO: 432 K/UL (ref 164–446)
PMV BLD AUTO: 10.4 FL (ref 9–12.9)
POTASSIUM SERPL-SCNC: 4.4 MMOL/L (ref 3.6–5.5)
PROT SERPL-MCNC: 5.6 G/DL (ref 6–8.2)
PROTHROMBIN TIME: 23.1 SEC (ref 12–14.6)
RBC # BLD AUTO: 2.91 M/UL (ref 4.7–6.1)
RETICS # AUTO: 0.09 M/UL (ref 0.04–0.12)
RETICS/RBC NFR: 3.1 % (ref 0.8–2.6)
SODIUM SERPL-SCNC: 140 MMOL/L (ref 135–145)
TRIGL SERPL-MCNC: 20 MG/DL (ref 0–149)
TROPONIN T SERPL-MCNC: 1313 NG/L (ref 6–19)
TROPONIN T SERPL-MCNC: 1318 NG/L (ref 6–19)
TROPONIN T SERPL-MCNC: 1331 NG/L (ref 6–19)
TROPONIN T SERPL-MCNC: 651 NG/L (ref 6–19)
UFH PPP CHRO-ACNC: 0.25 IU/ML
UFH PPP CHRO-ACNC: 0.29 IU/ML
UFH PPP CHRO-ACNC: 0.34 IU/ML
UFH PPP CHRO-ACNC: 0.43 IU/ML
WBC # BLD AUTO: 8.5 K/UL (ref 4.8–10.8)

## 2023-10-27 PROCEDURE — 85610 PROTHROMBIN TIME: CPT

## 2023-10-27 PROCEDURE — 85046 RETICYTE/HGB CONCENTRATE: CPT

## 2023-10-27 PROCEDURE — 99497 ADVNCD CARE PLAN 30 MIN: CPT | Performed by: INTERNAL MEDICINE

## 2023-10-27 PROCEDURE — C9113 INJ PANTOPRAZOLE SODIUM, VIA: HCPCS | Performed by: INTERNAL MEDICINE

## 2023-10-27 PROCEDURE — 85018 HEMOGLOBIN: CPT

## 2023-10-27 PROCEDURE — 80053 COMPREHEN METABOLIC PANEL: CPT

## 2023-10-27 PROCEDURE — 93010 ELECTROCARDIOGRAM REPORT: CPT | Performed by: INTERNAL MEDICINE

## 2023-10-27 PROCEDURE — 82550 ASSAY OF CK (CPK): CPT

## 2023-10-27 PROCEDURE — 99233 SBSQ HOSP IP/OBS HIGH 50: CPT | Mod: 25 | Performed by: INTERNAL MEDICINE

## 2023-10-27 PROCEDURE — C9113 INJ PANTOPRAZOLE SODIUM, VIA: HCPCS | Performed by: STUDENT IN AN ORGANIZED HEALTH CARE EDUCATION/TRAINING PROGRAM

## 2023-10-27 PROCEDURE — 84484 ASSAY OF TROPONIN QUANT: CPT

## 2023-10-27 PROCEDURE — A9270 NON-COVERED ITEM OR SERVICE: HCPCS | Performed by: INTERNAL MEDICINE

## 2023-10-27 PROCEDURE — 700111 HCHG RX REV CODE 636 W/ 250 OVERRIDE (IP): Performed by: INTERNAL MEDICINE

## 2023-10-27 PROCEDURE — 700111 HCHG RX REV CODE 636 W/ 250 OVERRIDE (IP): Mod: JZ | Performed by: STUDENT IN AN ORGANIZED HEALTH CARE EDUCATION/TRAINING PROGRAM

## 2023-10-27 PROCEDURE — 700102 HCHG RX REV CODE 250 W/ 637 OVERRIDE(OP): Performed by: INTERNAL MEDICINE

## 2023-10-27 PROCEDURE — 700105 HCHG RX REV CODE 258: Performed by: INTERNAL MEDICINE

## 2023-10-27 PROCEDURE — 85520 HEPARIN ASSAY: CPT | Mod: 91

## 2023-10-27 PROCEDURE — 700102 HCHG RX REV CODE 250 W/ 637 OVERRIDE(OP): Performed by: STUDENT IN AN ORGANIZED HEALTH CARE EDUCATION/TRAINING PROGRAM

## 2023-10-27 PROCEDURE — 85027 COMPLETE CBC AUTOMATED: CPT

## 2023-10-27 PROCEDURE — 85014 HEMATOCRIT: CPT

## 2023-10-27 PROCEDURE — 85730 THROMBOPLASTIN TIME PARTIAL: CPT

## 2023-10-27 PROCEDURE — 83605 ASSAY OF LACTIC ACID: CPT

## 2023-10-27 PROCEDURE — 93005 ELECTROCARDIOGRAM TRACING: CPT | Performed by: STUDENT IN AN ORGANIZED HEALTH CARE EDUCATION/TRAINING PROGRAM

## 2023-10-27 PROCEDURE — 83036 HEMOGLOBIN GLYCOSYLATED A1C: CPT

## 2023-10-27 PROCEDURE — 80061 LIPID PANEL: CPT

## 2023-10-27 PROCEDURE — 770020 HCHG ROOM/CARE - TELE (206)

## 2023-10-27 PROCEDURE — 99222 1ST HOSP IP/OBS MODERATE 55: CPT | Performed by: INTERNAL MEDICINE

## 2023-10-27 PROCEDURE — A9270 NON-COVERED ITEM OR SERVICE: HCPCS | Performed by: STUDENT IN AN ORGANIZED HEALTH CARE EDUCATION/TRAINING PROGRAM

## 2023-10-27 PROCEDURE — 36415 COLL VENOUS BLD VENIPUNCTURE: CPT

## 2023-10-27 PROCEDURE — 700105 HCHG RX REV CODE 258: Performed by: STUDENT IN AN ORGANIZED HEALTH CARE EDUCATION/TRAINING PROGRAM

## 2023-10-27 RX ORDER — HEPARIN SODIUM 5000 [USP'U]/100ML
0-30 INJECTION, SOLUTION INTRAVENOUS CONTINUOUS
Status: DISCONTINUED | OUTPATIENT
Start: 2023-10-27 | End: 2023-10-29

## 2023-10-27 RX ORDER — METOPROLOL SUCCINATE 25 MG/1
12.5 TABLET, EXTENDED RELEASE ORAL DAILY
Status: DISCONTINUED | OUTPATIENT
Start: 2023-10-27 | End: 2023-10-28

## 2023-10-27 RX ORDER — PHENOL 1.4 %
10 AEROSOL, SPRAY (ML) MUCOUS MEMBRANE DAILY
Status: ON HOLD | COMMUNITY

## 2023-10-27 RX ORDER — PREDNISONE 5 MG/1
5 TABLET ORAL DAILY
Status: DISCONTINUED | OUTPATIENT
Start: 2023-10-27 | End: 2023-10-28

## 2023-10-27 RX ORDER — SODIUM CHLORIDE 9 MG/ML
500 INJECTION, SOLUTION INTRAVENOUS ONCE
Status: ACTIVE | OUTPATIENT
Start: 2023-10-27 | End: 2023-10-28

## 2023-10-27 RX ORDER — HEPARIN SODIUM 1000 [USP'U]/ML
60 INJECTION, SOLUTION INTRAVENOUS; SUBCUTANEOUS ONCE
Status: COMPLETED | OUTPATIENT
Start: 2023-10-27 | End: 2023-10-27

## 2023-10-27 RX ORDER — PANTOPRAZOLE SODIUM 40 MG/10ML
40 INJECTION, POWDER, LYOPHILIZED, FOR SOLUTION INTRAVENOUS DAILY
Status: DISCONTINUED | OUTPATIENT
Start: 2023-10-27 | End: 2023-10-27

## 2023-10-27 RX ORDER — DOPAMINE HCL
1 POWDER (GRAM) MISCELLANEOUS DAILY
Status: DISCONTINUED | OUTPATIENT
Start: 2023-10-27 | End: 2023-10-27

## 2023-10-27 RX ORDER — THYROID 120 MG/1
120 TABLET ORAL
Status: DISCONTINUED | OUTPATIENT
Start: 2023-10-29 | End: 2023-11-10 | Stop reason: HOSPADM

## 2023-10-27 RX ORDER — MELATONIN/PYRIDOXINE HCL (B6) 5 MG-10 MG
100 TABLET,IMMED, EXTENDED RELEASE, BIPHASIC ORAL DAILY
Status: DISCONTINUED | OUTPATIENT
Start: 2023-10-27 | End: 2023-10-27

## 2023-10-27 RX ORDER — CHOLECALCIFEROL (VITAMIN D3) 125 MCG
10 CAPSULE ORAL NIGHTLY
Status: DISCONTINUED | OUTPATIENT
Start: 2023-10-27 | End: 2023-11-10 | Stop reason: HOSPADM

## 2023-10-27 RX ORDER — VITAMIN B COMPLEX
1000 TABLET ORAL DAILY
Status: ON HOLD | COMMUNITY

## 2023-10-27 RX ORDER — PANTOPRAZOLE SODIUM 40 MG/10ML
40 INJECTION, POWDER, LYOPHILIZED, FOR SOLUTION INTRAVENOUS 2 TIMES DAILY
Status: DISCONTINUED | OUTPATIENT
Start: 2023-10-27 | End: 2023-11-03

## 2023-10-27 RX ORDER — ANTIARTHRITIC COMBINATION NO.2 900 MG
25 TABLET ORAL DAILY
Status: DISCONTINUED | OUTPATIENT
Start: 2023-10-27 | End: 2023-10-27

## 2023-10-27 RX ORDER — HEPARIN SODIUM 1000 [USP'U]/ML
30 INJECTION, SOLUTION INTRAVENOUS; SUBCUTANEOUS PRN
Status: DISCONTINUED | OUTPATIENT
Start: 2023-10-27 | End: 2023-10-29

## 2023-10-27 RX ORDER — ASPIRIN 81 MG/1
81 TABLET ORAL DAILY
Status: DISCONTINUED | OUTPATIENT
Start: 2023-10-27 | End: 2023-10-30

## 2023-10-27 RX ORDER — THYROID 30 MG/1
60-120 TABLET ORAL DAILY
Status: DISCONTINUED | OUTPATIENT
Start: 2023-10-27 | End: 2023-10-27

## 2023-10-27 RX ORDER — THYROID 30 MG/1
60 TABLET ORAL
Status: DISCONTINUED | OUTPATIENT
Start: 2023-10-28 | End: 2023-11-10 | Stop reason: HOSPADM

## 2023-10-27 RX ADMIN — SODIUM CHLORIDE: 9 INJECTION, SOLUTION INTRAVENOUS at 17:09

## 2023-10-27 RX ADMIN — HEPARIN SODIUM 12 UNITS/KG/HR: 5000 INJECTION, SOLUTION INTRAVENOUS at 04:08

## 2023-10-27 RX ADMIN — DOCUSATE SODIUM 50 MG AND SENNOSIDES 8.6 MG 2 TABLET: 8.6; 5 TABLET, FILM COATED ORAL at 17:08

## 2023-10-27 RX ADMIN — ASPIRIN 81 MG: 81 TABLET, COATED ORAL at 05:18

## 2023-10-27 RX ADMIN — ENOXAPARIN SODIUM 40 MG: 100 INJECTION SUBCUTANEOUS at 00:49

## 2023-10-27 RX ADMIN — PANTOPRAZOLE SODIUM 40 MG: 40 INJECTION, POWDER, FOR SOLUTION INTRAVENOUS at 17:07

## 2023-10-27 RX ADMIN — PANTOPRAZOLE SODIUM 40 MG: 40 INJECTION, POWDER, FOR SOLUTION INTRAVENOUS at 09:26

## 2023-10-27 RX ADMIN — Medication 10 MG: at 21:49

## 2023-10-27 RX ADMIN — SODIUM CHLORIDE: 9 INJECTION, SOLUTION INTRAVENOUS at 00:50

## 2023-10-27 RX ADMIN — PREDNISONE 5 MG: 5 TABLET ORAL at 17:07

## 2023-10-27 RX ADMIN — HEPARIN SODIUM 3700 UNITS: 1000 INJECTION, SOLUTION INTRAVENOUS; SUBCUTANEOUS at 04:05

## 2023-10-27 ASSESSMENT — ENCOUNTER SYMPTOMS
FEVER: 0
ABDOMINAL PAIN: 0
SHORTNESS OF BREATH: 0
HEMATOCHEZIA: 0
SINUS PAIN: 0
COUGH: 0
FALLS: 1
DIAPHORESIS: 0
CHILLS: 0
HEMOPTYSIS: 0
NAUSEA: 0
MYALGIAS: 1
VOMITING: 0
DIARRHEA: 0
WHEEZING: 0

## 2023-10-27 ASSESSMENT — PATIENT HEALTH QUESTIONNAIRE - PHQ9
1. LITTLE INTEREST OR PLEASURE IN DOING THINGS: NOT AT ALL
SUM OF ALL RESPONSES TO PHQ9 QUESTIONS 1 AND 2: 0
2. FEELING DOWN, DEPRESSED, IRRITABLE, OR HOPELESS: NOT AT ALL

## 2023-10-27 NOTE — ASSESSMENT & PLAN NOTE
His sister is now at bedside and we had a long discussion with patient and his sister regarding goals of care.  Since patient has declined echo today we discussed regarding code status and also comfort care and hospice  Will change patient to DNR/DNI for now , no hospice or comfort care  Sister will talk and try to convince him to see if he is willing to have the echo   More then 18 min spent on discussion for goals of care alone

## 2023-10-27 NOTE — ASSESSMENT & PLAN NOTE
Troponin significantly elevated at outside facility and trending up, in the 600s here  EKG from transferring facility not concerning for acute ischemia, repeat EKG here ordered  Patient fell out of bed and was unable to get up, he does have a history of Parkinson's disease.  Unclear if he had a coronary event that resulted in him falling  I have consulted cardiology and discussed case with Dr. Kelly   Will continue on heparin drip for now, echo has been ordered since troponin trending up   No cardiac cath at this time as patient not a good candidate for it.  He declined echo initially now family at bedside and we had discussion, with patient sister who will discuss with him again to see if she can convince him to have the echo   close monitoring on tele   Echo showing ischemia/ MI on the LAD territory   Cardiology following  As per cardiology to hold off on his antiplatelet or OAC forhis A.fibb due to his anemia  I have ordered a FOBT to r/o GI bleed

## 2023-10-27 NOTE — ASSESSMENT & PLAN NOTE
CPK continues to trend up starting IV fluid but need to be careful  as patient is edematous   This was also discussed with family

## 2023-10-27 NOTE — PROGRESS NOTES
Hospital Medicine Daily Progress Note    Date of Service  10/27/2023    Chief Complaint  Elias Casanova is a 82 y.o. male admitted 10/26/2023 with NSTEMI, rhabdomyolysis     Hospital Course  This is a  82 y.o. male who was transferred from outside facility 10/26/2023 with rhabdomyolysis and elevated troponin.  PMH of A-fib on anticoagulation, Parkinson disease, hypertension, pulmonary hypertension  He was found on the ground, says he had fallen out of bed, he does not believe he lost consciousness.  He was too weak to get back up and thinks he was on the ground for about 12 hours or more.  He is found to have rhabdomyolysis at outside facility with CPK of 1100. He was also found to have elevated troponin and was sent here for high level of care     Interval Problem Update  Patient seen and examined, weak tirred, denie chest pain, troponin trending up, and his BP is also low. I held his metoprolol today as his SBP went as low as 70.   His CPK also trending up so he has been started on IV fluid, but will need to be carefull as patient has already lower extremity edema.  Cardiology was consulted for concern for NSTEMI and he has been started on heparin drip, an echo has been ordered   His sister is at bed side and we discussed with his sister and patient regarding goals of care code status has been changed to DNR/DNI   Initially declined echo but sister said will talk and try to convince him to have the echo   Prognosis is really guarded at this time   Restarted back his parkinson medication   I have discussed this patient's plan of care and discharge plan at IDT rounds today with Case Management, Nursing, Nursing leadership, and other members of the IDT team.    Consultants/Specialty  cardiology    Code Status  DNAR/DNI    Disposition  The patient is not medically cleared for discharge to home or a post-acute facility.      I have placed the appropriate orders for post-discharge needs.    Review of Systems  Review of  Systems   Constitutional:  Negative for chills and fever.   HENT:  Negative for congestion, nosebleeds and sinus pain.    Respiratory:  Negative for cough and shortness of breath.    Cardiovascular:  Negative for chest pain.   Gastrointestinal:  Negative for abdominal pain, diarrhea, nausea and vomiting.   Genitourinary:  Negative for dysuria and urgency.   Musculoskeletal:  Positive for falls and myalgias.   All other systems reviewed and are negative.       Physical Exam  Temp:  [35 °C (95 °F)-36.9 °C (98.4 °F)] 36.6 °C (97.8 °F)  Pulse:  [] 81  Resp:  [13-25] 18  BP: ()/() 89/55  SpO2:  [90 %-100 %] 94 %    Physical Exam  Constitutional:       Appearance: Normal appearance.   HENT:      Head: Normocephalic and atraumatic.      Mouth/Throat:      Mouth: Mucous membranes are moist.      Pharynx: Oropharynx is clear. No oropharyngeal exudate or posterior oropharyngeal erythema.   Eyes:      General: No scleral icterus.  Cardiovascular:      Rate and Rhythm: Normal rate and regular rhythm.      Pulses: Normal pulses.      Heart sounds: Normal heart sounds. No murmur heard.  Pulmonary:      Effort: Pulmonary effort is normal. No respiratory distress.      Breath sounds: Normal breath sounds. No wheezing.   Abdominal:      Palpations: Abdomen is soft.      Tenderness: There is no abdominal tenderness.   Musculoskeletal:         General: No swelling or tenderness. Normal range of motion.      Cervical back: Normal range of motion.   Skin:     General: Skin is warm and dry.   Neurological:      General: No focal deficit present.      Mental Status: He is alert and oriented to person, place, and time. Mental status is at baseline.   Psychiatric:         Mood and Affect: Mood normal.         Fluids  No intake or output data in the 24 hours ending 10/27/23 1532    Laboratory  Recent Labs     10/26/23  1518 10/27/23  0004 10/27/23  0914   WBC 8.9 8.5  --    RBC 3.01* 2.91*  --    HEMOGLOBIN 8.4* 8.0* 9.5*    HEMATOCRIT 27.8* 27.2* 32.6*   MCV 92.4 93.5  --    MCH 27.9* 27.5  --    MCHC 30.2* 29.4*  --    RDW 15.9* 54.2*  --    PLATELETCT 362 432  --    MPV 10.5* 10.4  --      Recent Labs     10/26/23  1518 10/27/23  0004   SODIUM 138 140   POTASSIUM 4.6 4.4   CHLORIDE 111* 109   CO2 14* 17*   GLUCOSE 91 89   BUN 40* 37*   CREATININE 1.1 1.26   CALCIUM 8.4* 8.5     Recent Labs     10/27/23  0316   APTT 74.5*   INR 2.02*         Recent Labs     10/27/23  0004   TRIGLYCERIDE 20   HDL 32*   LDL 59       Imaging  EC-ECHOCARDIOGRAM COMPLETE W/O CONT    (Results Pending)        Assessment/Plan  * NSTEMI (non-ST elevated myocardial infarction) (HCC)- (present on admission)  Assessment & Plan  Troponin significantly elevated at outside facility and trending up, in the 600s here  EKG from transferring facility not concerning for acute ischemia, repeat EKG here ordered  Patient fell out of bed and was unable to get up, he does have a history of Parkinson's disease.  Unclear if he had a coronary event that resulted in him falling  I have consulted cardiology and discussed case with Dr. Kelly   Will continue on heparin drip for now, echo has been ordered since troponin trending up   No cardiac cath at this time as patient not a good candidate for it.  He declined echo initially now family at bedside and we had discussion, with patient sister who will discuss with him again to see if she can convince him to have the echo   close monitoring on tele     Advance care planning  Assessment & Plan  His sister is now at bedside and we had a long discussion with patient and his sister regarding goals of care.  Since patient has declined echo today we discussed regarding code status and also comfort care and hospice  Will change patient to DNR/DNI for now , no hospice or comfort care  Sister will talk and try to convince him to see if he is willing to have the echo   More then 18 min spent on discussion for goals of care alone       Anemia-  (present on admission)  Assessment & Plan  Baseline hemoglobin 12-14.  Was 8.9 on presentation.  No signs of GI bleed, unclear etiology  He is on dabigatran due to history of A-fib  And is now on heparin drip  Has been started on PPI will check a occult blood stool     Non-traumatic rhabdomyolysis- (present on admission)  Assessment & Plan  CPK continues to trend up starting IV fluid but need to be careful  as patient is edematous   This was also discussed with family     Persistent atrial fibrillation (HCC)- (present on admission)  Assessment & Plan  Follows with cardiology, holding metoprolol as his BP is low   Holding pradaxa as he is on heparin drip     Parkinson disease- (present on admission)  Assessment & Plan  Cont outpatient regimen   PT, OT ordered         VTE prophylaxis: heparin drip     The very real possibilty of a deterioration of this patient's condition required the highest level of my preparedness for sudden, emergent intervention.  I provided services, which included medication orders, frequent reevaluations of the patient's condition and response to treatment, ordering and reviewing test results, and discussing the case with various consultants.  The  time associated with the care of the patient was 51 minutes.        I have performed a physical exam and reviewed and updated ROS and Plan today (10/27/2023). In review of yesterday's note (10/26/2023), there are no changes except as documented above.

## 2023-10-27 NOTE — CONSULTS
Cardiology Initial Consult Note    Date of note:    10/27/2023      Consulting Physician: Stefan Davis M.D.    Name:   Elias Casanova   YOB: 1940  Age:   82 y.o.  male   MRN:   [id number]    Reason for Consultation: Troponin elevation, outside hospital 3.14, then 3.57, here troponin T is 651, possible non-ST elevation myocardial infarction    HPI:  Elias Casanova is a 82 y.o. male with a history of persistent atrial fibrillation on Pradaxa and metoprolol succinate 25 mg p.o. daily, Parkinson's disease, primary adrenal insufficiency on prednisone, obstructive sleep apnea, who presents as a transfer after a fall and generalized weakness.  He was found to have an elevated troponin but also elevated CPK of 1100 as he was found down potentially rhabdomyolysis.    Pertinent labs shows a hemoglobin of 8.4, this is down from hemoglobin of 12.3 on 6/15/2022.    Patient reports he bought a new bed that is quite slippery and what happened was he slid off the bed and laid on the floor for about 4 to 5 hours before he can get any help.  He reports he lives in the basement of his house.  He does live with friends.  He denies any chest pain or discomfort.  He really has not had any chest pain or discomfort.  Denies any breathing issues.  He is somewhat of a difficult historian.  I was told by nurse he is somewhat a little bit confused and his story.    Problem list:  Principal Problem:    NSTEMI (non-ST elevated myocardial infarction) (HCC) (POA: Yes)  Active Problems:    Parkinson disease (Chronic) (POA: Yes)    Persistent atrial fibrillation (HCC) (Chronic) (POA: Yes)    Non-traumatic rhabdomyolysis (POA: Yes)    Anemia (POA: Yes)  Resolved Problems:    * No resolved hospital problems. *    Past Medical History:   Diagnosis Date    Elevated liver function tests 6/29/2018    Hyponatremia 6/29/2018    Parkinson's disease     Thrombocytosis 7/25/2020     History reviewed. No pertinent surgical  history.      Medications Prior to Admission   Medication Sig Dispense Refill Last Dose    MELATONIN PO Take  by mouth every day.       Probiotic Product (ALIGN PO) Take  by mouth every day.       NP THYROID 60 MG Tab take one tablet by mouth every morning monday through saturday, take two tablets by mouth on sundays only 120 Tablet 0     PRADAXA 150 MG Cap capsule TAKE ONE CAPSULE BY MOUTH TWICE DAILY 180 Capsule 3     potassium chloride SA (KDUR) 20 MEQ Tab CR every day.       metoprolol SR (TOPROL XL) 25 MG TABLET SR 24 HR Take 0.5 Tablets by mouth every day. 30 Tablet 3     predniSONE (DELTASONE) 5 MG Tab Take 1 tab once daily po 90 Tablet 1     NEUPRO 4 MG/24HR PATCH 24 HR        MELATONIN MAXIMUM STRENGTH PO Take 65 mg by mouth every day.       DHEA 25 MG Tab Take 1 Tablet by mouth every day.       Coenzyme Q10 (COQ-10) 100 MG Cap CR Take  by mouth.       Cholecalciferol (D-5000 PO) Take  by mouth.       DOPamine HCl Powder 1 Dose by Does not apply route.       Misc Natural Products (PROSTATE HEALTH PO) Take 1 Tab by mouth 2 Times a Day. (Patient not taking: Reported on 4/5/2023)       Tyrosine Powder 2.5 g by Does not apply route 3 times a day.       Cysteine 500 MG Cap Take 6 Capsules by mouth 3 times a day.        Current Facility-Administered Medications   Medication Dose Route Frequency Provider Last Rate Last Admin    heparin infusion 25,000 units in 500 mL 0.45% NACL  0-30 Units/kg/hr Intravenous Continuous Mayuri Pickett M.D. 14.9 mL/hr at 10/27/23 0653 12 Units/kg/hr at 10/27/23 0653    heparin injection 1,900 Units  30 Units/kg Intravenous PRN Mayuri Pickett M.D.        aspirin EC tablet 81 mg  81 mg Oral DAILY Mayuri Pickett M.D.   81 mg at 10/27/23 0518    [Held by provider] metoprolol SR (Toprol XL) tablet 12.5 mg  12.5 mg Oral DAILY Mayuri Pickett M.D.        pantoprazole (Protonix) injection 40 mg  40 mg Intravenous DAILY Mayuri Pickett M.D.        NS infusion 500 mL  500 mL Intravenous  Once Stefan Davis M.D.   Held at 10/27/23 0815    senna-docusate (Pericolace Or Senokot S) 8.6-50 MG per tablet 2 Tablet  2 Tablet Oral BID Mayuri Pickett M.D.        And    polyethylene glycol/lytes (Miralax) PACKET 1 Packet  1 Packet Oral QDAY PRN Mayuri Pickett M.D.        And    magnesium hydroxide (Milk Of Magnesia) suspension 30 mL  30 mL Oral QDAY PRN Mayuri Pickett M.D.        And    bisacodyl (Dulcolax) suppository 10 mg  10 mg Rectal QDAY PRN Mayuri Pickett M.D.        NS infusion   Intravenous Continuous Mayuri Pickett M.D. 125 mL/hr at 10/27/23 0050 New Bag at 10/27/23 0050    acetaminophen (Tylenol) tablet 650 mg  650 mg Oral Q6HRS PRN Mayuri Pickett M.D.             Allergies   Allergen Reactions    Novocain Unspecified     Eye swelling and redness         Family History   Problem Relation Age of Onset    Other Mother 88        hip fx         Social History     Socioeconomic History    Marital status: Single     Spouse name: Not on file    Number of children: Not on file    Years of education: Not on file    Highest education level: Bachelor's degree (e.g., BA, AB, BS)   Occupational History    Not on file   Tobacco Use    Smoking status: Former     Current packs/day: 0.00     Types: Cigarettes     Quit date: 3/25/1990     Years since quittin.6    Smokeless tobacco: Never   Vaping Use    Vaping Use: Never used   Substance and Sexual Activity    Alcohol use: No     Comment: 3/25/21-quit at age 42    Drug use: Never    Sexual activity: Not on file   Other Topics Concern    Not on file   Social History Narrative    Not on file     Social Determinants of Health     Financial Resource Strain: Not on file   Food Insecurity: No Food Insecurity (2023)    Hunger Vital Sign     Worried About Running Out of Food in the Last Year: Never true     Ran Out of Food in the Last Year: Never true   Transportation Needs: No Transportation Needs (2023)    PRAPARE - Transportation     Lack of  Transportation (Medical): No     Lack of Transportation (Non-Medical): No   Physical Activity: Inactive (2/14/2023)    Exercise Vital Sign     Days of Exercise per Week: 0 days     Minutes of Exercise per Session: 0 min   Stress: No Stress Concern Present (2/14/2023)    [employer] [place] [employer] - Occupational Stress Questionnaire     Feeling of Stress : Only a little   Social Connections: Unknown (2/14/2023)    Social Connection and Isolation Panel [NHANES]     Frequency of Communication with Friends and Family: More than three times a week     Frequency of Social Gatherings with Friends and Family: Once a week     Attends [employer] Services: Never     Active Member of Clubs or Organizations: Not on file     Attends Club or Organization Meetings: Not on file     Marital Status: Living with partner   Intimate Partner Violence: Not on file   Housing Stability: Not on file     No intake or output data in the 24 hours ending 10/27/23 0857        Review of Systems   Constitutional: Negative for diaphoresis and fever.   Respiratory:  Negative for cough, hemoptysis and wheezing.    Gastrointestinal:  Negative for hematochezia and melena.   Genitourinary:  Negative for hematuria.        Physical Exam  There is no height or weight on file to calculate BMI.  /65   Pulse 90   Temp 36.8 °C (98.2 °F) (Temporal)   Resp 18   SpO2 93%   Vitals:    10/27/23 0041 10/27/23 0512 10/27/23 0731 10/27/23 0754   BP: 120/84 (!) 153/110 (!) 80/59 114/65   Pulse: 84 91 90    Resp: 19 18 18    Temp: 36.6 °C (97.9 °F) 36.7 °C (98.1 °F) 36.8 °C (98.2 °F)    TempSrc: Temporal Temporal Temporal    SpO2: 97% 90% 93%      Oxygen Therapy:  Pulse Oximetry: 93 %, O2 (LPM): 1, O2 Delivery Device: Silicone Nasal Cannula    Physical Exam  Constitutional:       Appearance: Normal appearance.      Comments: Frail   Eyes:      Extraocular Movements: Extraocular movements intact.      Conjunctiva/sclera: Conjunctivae normal.   Neck:       Vascular: No carotid bruit or JVD.   Cardiovascular:      Rate and Rhythm: Normal rate and regular rhythm.      Pulses:           Radial pulses are 1+ on the right side and 1+ on the left side.        Posterior tibial pulses are 1+ on the right side and 1+ on the left side.      Heart sounds: Normal heart sounds. No murmur heard.     No friction rub. No gallop.   Pulmonary:      Effort: Pulmonary effort is normal. No respiratory distress.      Breath sounds: Normal breath sounds. No wheezing or rales.   Abdominal:      General: Bowel sounds are normal.      Palpations: Abdomen is soft.   Musculoskeletal:      Cervical back: Neck supple.      Right lower leg: Edema present.      Left lower leg: Edema present.   Skin:     General: Skin is warm and dry.      Comments: Fingers bilaterally discolored, reddish.  Lower extremity livideo reticularis almost appearing.    Neurological:      Mental Status: He is alert and oriented to person, place, and time. Mental status is at baseline.   Psychiatric:         Mood and Affect: Mood normal.        Labs (personally reviewed and notable for):   Lab Results   Component Value Date/Time    SODIUM 140 10/27/2023 12:04 AM    POTASSIUM 4.4 10/27/2023 12:04 AM    CHLORIDE 109 10/27/2023 12:04 AM    CO2 17 (L) 10/27/2023 12:04 AM    GLUCOSE 89 10/27/2023 12:04 AM    BUN 37 (H) 10/27/2023 12:04 AM    CREATININE 1.26 10/27/2023 12:04 AM      Lab Results   Component Value Date/Time    WBC 8.5 10/27/2023 12:04 AM    RBC 2.91 (L) 10/27/2023 12:04 AM    HEMOGLOBIN 8.0 (L) 10/27/2023 12:04 AM    HEMATOCRIT 27.2 (L) 10/27/2023 12:04 AM    MCV 93.5 10/27/2023 12:04 AM    MCH 27.5 10/27/2023 12:04 AM    MCHC 29.4 (L) 10/27/2023 12:04 AM    MPV 10.4 10/27/2023 12:04 AM    INR 2.02 (H) 10/27/2023 03:16 AM      Lab Results   Component Value Date/Time    CHOLSTRLTOT 95 (L) 10/27/2023 12:04 AM    LDL 59 10/27/2023 12:04 AM    HDL 32 (A) 10/27/2023 12:04 AM    TRIGLYCERIDE 20 10/27/2023 12:04 AM     "   Lab Results   Component Value Date/Time    TROPONINT 651 (H) 10/27/2023 0004     No results found for: \"NTPROBNP\"  Lab Results   Component Value Date/Time    HBA1C 5.5 10/27/2023 0004     Cardiac Imaging and Procedures Review:    EKG dated 10/27/23: My personal interpretation is sinus rhythm, 86 bpm, low voltages throughout, nonspecific ST-T changes, poor R wave progression, compared to prior ECG of 23, there is loss of R wave progression and low voltages now present  ECG dated 10/26/23: Atrial fibrillation, ventricular rate of 93 bpm, nonspecific ST-T changes    Echo dated 2022: This is an outside echo, review report is normal LVEF with normal wall motion, dilated left atrial size, normal right ventricular systolic function.  Mild aortic calcification with trace aortic regurgitation.  RV systolic pressure estimated at 39 mm capital Hg.    Telemetry (last 24 hours): sinus rhythm    CT of the head here shows no acute intracranial process    Radiology test Review:  EC-ECHOCARDIOGRAM COMPLETE W/O CONT    (Results Pending)     Impression and Medical Decision Makin.  Non-ST elevation MI, likely type II secondary to being down for a long time and anemia.  Patient is not a good cardiac cath candidate with other multiple comorbidities and his frailty.  As he is never reported chest discomfort, would opt for more conservative treatment.  As he is also quite anemic, although there is no overt bleeding, because this seems more like a type II MI, once troponins start to trend down I think we can discontinue the heparin drip.  I discussed this with the patient.  I am not sure how much he understands but he is in agreement.  -Continue to trend troponin till we see a peak  - Continue heparin drip for now, but if troponin has peaked already I think can discontinue it  - Enteric-coated aspirin 81 mg p.o. daily, I would not add clopidogrel because of his anemia and not sure if there is any source of bleeding and he " is on Pradaxa  - Beta-blocker has been added, but may be limited by low blood pressure  - Consider adding statin medication, although his LDL is 59 so I think I would also hold off at this time  - Check echocardiogram, already ordered    2.  Persistent atrial fibrillation.  Interestingly, his last ECG suggest he is in sinus rhythm.  - Continue with metoprolol 12.5 mg p.o. daily    3.  Patient was on Pradaxa 150 mg p.o. twice daily for stroke prevention from atrial fibrillation.  However if no source of bleeding can be found and patient is high risk, can consider occluder device.  However we will see what the echo shows to calculate his Tony vas score.      Thank you for allowing me to participate in the care of this patient, cardiology will continue to follow.  Please contact me with any questions.      Jannet Kelly M.D.  Cardiologist, [facility] [place]     This note was generated using voice recognition software which has a small chance of producing errors of grammar and possibly content. I have made every reasonable attempt to find and correct any obvious errors, but expect that some may not be found prior to finalization of this note.

## 2023-10-27 NOTE — ASSESSMENT & PLAN NOTE
Baseline hemoglobin 12-14.  Was 8.9 on presentation.    His OAC and antiplatelet have been held. I have ordered for FOBT to r/o GI bleed if that positive will consult GI   His hemoglobin yesterday was 6.5 and got 1 unit of RBC which brought his hemoglobin up to 9.4 uncleare if the 6.5 was an eror or possible due to his fluid overload   I have ordered for FOBT if that is positive will consult GI

## 2023-10-27 NOTE — CARE PLAN
The patient is Stable - Low risk of patient condition declining or worsening    Shift Goals  Clinical Goals: VSS, safety  Patient Goals: comfort  Family Goals: updates    Progress made toward(s) clinical / shift goals:  yes    Patient is not progressing towards the following goals:      Problem: Knowledge Deficit - Standard  Goal: Patient and family/care givers will demonstrate understanding of plan of care, disease process/condition, diagnostic tests and medications  Outcome: Progressing     Problem: Skin Integrity  Goal: Skin integrity is maintained or improved  Outcome: Progressing     Problem: Fall Risk  Goal: Patient will remain free from falls  Outcome: Progressing

## 2023-10-27 NOTE — PROGRESS NOTES
[facility] DIRECT ADMISSION REPORT  Transferring facility: [facility]  Transferring physician: Elvin Novoa    Chief complaint: Fall, generalized weakness  Pertinent history & patient course: 82-year-old male brought to ER after ground-level fall, missed doctor appointment.  No chest pains or shortness of breath.  Work-up included troponin I 3.14, repeat 3.57 (ULN 0.04) after IVF administration.  Notable CPK of 1180, lactic acid 2.4, bicarb 14, creatinine 1.1, BUN 40.  No STEMI seen on EKG.  Ultimately, no cardiology service available at facility.   concern for rhabdomyolysis with dehydration, due to elevated troponin, hospitalist at transferring facility and uncomfortable admitting patient there.  Therefore transfer being requested.  Received 2L NS bolus, ASA 324mg    Pertinent imaging & lab results: See epic  Consultants called prior to transfer and pertinent input from consultants: N/A  Code Status:  per transferring provider, I personally verified with the transferring provider patient's code status and the transferring provider has confirmed this with the patient.  Reason for Transfer: Possible cardiac evaluation  Further work up or recommendations requested prior to transfer: IVF, repeat cardiac enzymes    Patient accepted for transfer: Yes  Accepting [facility] Facility: [facility] - Nursing to notify the Triage Coordinator in the RTOC via Voalte or Phone ext. [phone] when patient arrives to the unit. The Triage Coordinator will assign the admitting provider.    Consultants to be called upon arrival: N/A  Admission status: Inpatient.   Floor requested: Telemetry  If ICU transfer, name of intensivist case discussed with and pertinent input from critical care:     The admitting provider is the point of contact for questions or concerns regarding patient's care.

## 2023-10-27 NOTE — ASSESSMENT & PLAN NOTE
Follows with cardiology, holding metoprolol as his BP is low   Holding pradaxa as he is on heparin drip

## 2023-10-27 NOTE — H&P
Hospital Medicine History & Physical Note    Date of Service  10/26/2023    Primary Care Physician  Luz Quiroga D.O.    Code Status  Full Code    Chief Complaint  Fall    History of Presenting Illness  Elias Casanova is a 82 y.o. male who was transferred from outside facility 10/26/2023 with rhabdomyolysis and elevated troponin.  PMH of A-fib on anticoagulation, Parkinson disease, hypertension, pulmonary hypertension.  We will check was called on the patient due to missing his doctors appointment and sounding off when his doctor's office called him.  He was found on the ground, says he had fallen out of bed, he does not believe he lost consciousness.  He was too weak to get back up and thinks he was on the ground for about 12 hours or more.  He is found to have rhabdomyolysis at outside facility with CPK of 1100.  He denied having any chest pain but troponin was elevated at 3.14, repeat was increased to 3.57.    At this time patient just feels weak, denies any pain.    I discussed the plan of care with patient and bedside RN.    Review of Systems  Review of Systems   Constitutional:  Negative for chills and fever.   Respiratory:  Negative for cough and shortness of breath.    Cardiovascular:  Negative for chest pain.   Gastrointestinal:  Negative for abdominal pain, diarrhea, nausea and vomiting.   Genitourinary:  Negative for dysuria and urgency.   Musculoskeletal:  Positive for falls and myalgias.       Past Medical History   has a past medical history of Elevated liver function tests (6/29/2018), Hyponatremia (6/29/2018), Parkinson's disease, and Thrombocytosis (7/25/2020).    Surgical History   has no past surgical history on file.     Family History  family history includes Other (age of onset: 88) in his mother.   Family history reviewed with patient. There is no family history that is pertinent to the chief complaint.     Social History   reports that he quit smoking about 33 years ago. His smoking  use included cigarettes. He has never used smokeless tobacco. He reports that he does not drink alcohol and does not use drugs.    Allergies  Allergies   Allergen Reactions    Novocain Unspecified     Eye swelling and redness       Medications  Prior to Admission Medications   Prescriptions Last Dose Informant Patient Reported? Taking?   Cholecalciferol (D-5000 PO)   Yes No   Sig: Take  by mouth.   Coenzyme Q10 (COQ-10) 100 MG Cap CR   Yes No   Sig: Take  by mouth.   Cysteine 500 MG Cap   Yes No   Sig: Take 6 Capsules by mouth 3 times a day.   DHEA 25 MG Tab   Yes No   Sig: Take 1 Tablet by mouth every day.   DOPamine HCl Powder   Yes No   Si Dose by Does not apply route.   MELATONIN MAXIMUM STRENGTH PO   Yes No   Sig: Take 65 mg by mouth every day.   MELATONIN PO   Yes No   Sig: Take  by mouth every day.   Misc Natural Products (PROSTATE HEALTH PO)   Yes No   Sig: Take 1 Tab by mouth 2 Times a Day.   Patient not taking: Reported on 2023   NEUPRO 4 MG/24HR PATCH 24 HR   Yes No   NP THYROID 60 MG Tab   No No   Sig: take one tablet by mouth every morning monday through saturday, take two tablets by mouth on sundays only   PRADAXA 150 MG Cap capsule   No No   Sig: TAKE ONE CAPSULE BY MOUTH TWICE DAILY   Probiotic Product (ALIGN PO)   Yes No   Sig: Take  by mouth every day.   Tyrosine Powder   Yes No   Si.5 g by Does not apply route 3 times a day.   metoprolol SR (TOPROL XL) 25 MG TABLET SR 24 HR   No No   Sig: Take 0.5 Tablets by mouth every day.   potassium chloride SA (KDUR) 20 MEQ Tab CR   Yes No   Sig: every day.   predniSONE (DELTASONE) 5 MG Tab   No No   Sig: Take 1 tab once daily po      Facility-Administered Medications: None       Physical Exam  Temp:  [34.9 °C (94.8 °F)-36.7 °C (98.1 °F)] 36.7 °C (98.1 °F)  Pulse:  [] 91  Resp:  [13-25] 18  BP: ()/() 153/110  SpO2:  [90 %-100 %] 90 %  Blood Pressure : 104/71   Temperature: 36.5 °C (97.7 °F)   Pulse: 95   Respiration: 20   Pulse  "Oximetry: 94 %       Physical Exam  Constitutional:       Appearance: Normal appearance.   HENT:      Head: Normocephalic and atraumatic.      Mouth/Throat:      Mouth: Mucous membranes are moist.      Pharynx: Oropharynx is clear. No oropharyngeal exudate or posterior oropharyngeal erythema.   Eyes:      General: No scleral icterus.  Cardiovascular:      Rate and Rhythm: Normal rate and regular rhythm.      Pulses: Normal pulses.      Heart sounds: Normal heart sounds. No murmur heard.  Pulmonary:      Effort: Pulmonary effort is normal. No respiratory distress.      Breath sounds: Normal breath sounds. No wheezing.   Abdominal:      Palpations: Abdomen is soft.      Tenderness: There is no abdominal tenderness.   Musculoskeletal:         General: No swelling or tenderness. Normal range of motion.      Cervical back: Normal range of motion.   Skin:     General: Skin is warm and dry.   Neurological:      General: No focal deficit present.      Mental Status: He is alert and oriented to person, place, and time. Mental status is at baseline.   Psychiatric:         Mood and Affect: Mood normal.         Laboratory:  Recent Labs     10/26/23  1518 10/27/23  0004   WBC 8.9 8.5   RBC 3.01* 2.91*   HEMOGLOBIN 8.4* 8.0*   HEMATOCRIT 27.8* 27.2*   MCV 92.4 93.5   MCH 27.9* 27.5   MCHC 30.2* 29.4*   RDW 15.9* 54.2*   PLATELETCT 362 432   MPV 10.5* 10.4     Recent Labs     10/26/23  1518 10/27/23  0004   SODIUM 138 140   POTASSIUM 4.6 4.4   CHLORIDE 111* 109   CO2 14* 17*   GLUCOSE 91 89   BUN 40* 37*   CREATININE 1.1 1.26   CALCIUM 8.4* 8.5     Recent Labs     10/26/23  1518 10/27/23  0004   ALTSGPT 45 45   ASTSGOT 96* 115*   ALKPHOSPHAT 90 96   TBILIRUBIN 0.7 0.3   GLUCOSE 91 89     Recent Labs     10/27/23  0316   APTT 74.5*   INR 2.02*     No results for input(s): \"NTPROBNP\" in the last 72 hours.  Recent Labs     10/27/23  0004   TRIGLYCERIDE 20   HDL 32*   LDL 59     Recent Labs     10/27/23  0004   TROPONINT 651* "       Imaging:  No orders to display     Assessment/Plan:  Justification for Admission Status  I anticipate this patient will require at least two midnights for appropriate medical management, necessitating inpatient admission because rhabdo    * NSTEMI (non-ST elevated myocardial infarction) (HCC)- (present on admission)  Assessment & Plan  Troponin significantly elevated at outside facility and trending up, in the 600s here  EKG from transferring facility not concerning for acute ischemia, repeat EKG here ordered  Patient fell out of bed and was unable to get up, he does have a history of Parkinson's disease.  Unclear if he had a coronary event that resulted in him falling  I started patient on heparin drip, continue home metoprolol, aspirin  Cardiology consult in the morning    Anemia- (present on admission)  Assessment & Plan  Baseline hemoglobin 12-14.  Was 8.9 on presentation.  No signs of GI bleed, unclear etiology  He is on dabigatran due to history of A-fib  Has NSTEMI at this time, will continue with heparin drip.  CBC ordered to continue monitoring, transfuse if less than 7  PPI ordered    Non-traumatic rhabdomyolysis- (present on admission)  Assessment & Plan  CPK 1100.  IV fluids    Persistent atrial fibrillation (HCC)- (present on admission)  Assessment & Plan  Follows with cardiology, continue metoprolol    Parkinson disease- (present on admission)  Assessment & Plan  PT, OT ordered    Patient is critically ill.   The patient continues to have: NSTEMI and rhabdomyolysis  If untreated there is a high chance of deterioration And eventually death.   The critical care that I am providing today is: Extensive chart review including notes, labs, imaging, EKG and interpretation.  I started patient on heparin drip for concerns for type I NSTEMI, needs close monitoring with frequent labs for titration.  He also has anemia needs close monitoring for any signs of bleed.  The critical that has been undertaken is  medically complex.   There has been no overlap in critical care time.   Critical Care Time not including procedures: 42 minutes      VTE prophylaxis: therapeutic anticoagulation with heparin

## 2023-10-27 NOTE — PROGRESS NOTES
Med rec complete per patient/home pharmacy  Allergies reviewed.   Patient denies any outpatient antibiotics in the last 30 days.   Anticoagulants taken in the last 14 days? Yes   Anticoagulant: Pradaxa 150 mg BID, Last dose: UNK.     Patient unsure if he last took meds yesterday (10/26/23) or day before.     Preferred pharmacy for this visit - [facility] in [place]     Funmilayo Almonte CPhT

## 2023-10-27 NOTE — THERAPY
Physical Therapy Contact Note    Patient Name: Elias Casanova  Age:  82 y.o., Sex:  male  Medical Record #: [id number]  Today's Date: 10/27/2023    Pt with elevated troponin that is up trending. Pt not appropriate for PT at this time. Will continue to follow and attempt evaluation when appropriate.

## 2023-10-27 NOTE — PROGRESS NOTES
4 Eyes Skin Assessment Completed by CHITO Franco and ROOSEVELT Castro.    Head WDL  Ears WDL  Nose WDL  Mouth WDL, dry  Neck WDL  Breast/Chest WDL  Shoulder Blades WDL  Spine WDL  (R) Arm/Elbow/Hand Redness, Blanching, Bruising, and Scab  (L) Arm/Elbow/Hand Redness, Blanching, Bruising, and Scab  Abdomen WDL  Groin WDL  Scrotum/Coccyx/Buttocks Redness and Blanching  (R) Leg Redness, Blanching, Scab, and Bruising  (L) Leg Redness, Blanching, Scab, and Bruising  (R) Heel/Foot/Toe Redness, Blanching, Boggy, Bruising, and Scab  (L) Heel/Foot/Toe Redness, Blanching, Boggy, Bruising, and Scab          Devices In Places Tele Box, Blood Pressure Cuff, and Pulse Ox      Interventions In Place Pillows and Heels Loaded W/Pillows    Possible Skin Injury No    Pictures Uploaded Into Epic N/A  Wound Consult Placed N/A  RN Wound Prevention Protocol Ordered No

## 2023-10-27 NOTE — CARE PLAN
The patient is Watcher - Medium risk of patient condition declining or worsening    Shift Goals  Clinical Goals: VSS, safety  Patient Goals: comfort  Family Goals: updates    Progress made toward(s) clinical / shift goals:    Problem: Knowledge Deficit - Standard  Goal: Patient and family/care givers will demonstrate understanding of plan of care, disease process/condition, diagnostic tests and medications  Outcome: Progressing     Problem: Skin Integrity  Goal: Skin integrity is maintained or improved  Outcome: Progressing     Problem: Fall Risk  Goal: Patient will remain free from falls  Outcome: Progressing       Patient is not progressing towards the following goals:

## 2023-10-28 ENCOUNTER — APPOINTMENT (OUTPATIENT)
Dept: RADIOLOGY | Facility: MEDICAL CENTER | Age: 83
DRG: 280 | End: 2023-10-28
Attending: NURSE PRACTITIONER
Payer: MEDICARE

## 2023-10-28 LAB
ANION GAP SERPL CALC-SCNC: 13 MMOL/L (ref 7–16)
ANION GAP SERPL CALC-SCNC: 15 MMOL/L (ref 7–16)
BASE EXCESS BLDA CALC-SCNC: -10 MMOL/L (ref -4–3)
BODY TEMPERATURE: 36.7 CENTIGRADE
BUN SERPL-MCNC: 44 MG/DL (ref 8–22)
BUN SERPL-MCNC: 47 MG/DL (ref 8–22)
CALCIUM SERPL-MCNC: 7.9 MG/DL (ref 8.5–10.5)
CALCIUM SERPL-MCNC: 7.9 MG/DL (ref 8.5–10.5)
CHLORIDE SERPL-SCNC: 108 MMOL/L (ref 96–112)
CHLORIDE SERPL-SCNC: 112 MMOL/L (ref 96–112)
CK SERPL-CCNC: 1169 U/L (ref 0–154)
CO2 SERPL-SCNC: 14 MMOL/L (ref 20–33)
CO2 SERPL-SCNC: 16 MMOL/L (ref 20–33)
CREAT SERPL-MCNC: 1.4 MG/DL (ref 0.5–1.4)
CREAT SERPL-MCNC: 1.69 MG/DL (ref 0.5–1.4)
D DIMER PPP IA.FEU-MCNC: 0.33 UG/ML (FEU) (ref 0–0.5)
ERYTHROCYTE [DISTWIDTH] IN BLOOD BY AUTOMATED COUNT: 54.1 FL (ref 35.9–50)
GFR SERPLBLD CREATININE-BSD FMLA CKD-EPI: 40 ML/MIN/1.73 M 2
GFR SERPLBLD CREATININE-BSD FMLA CKD-EPI: 51 ML/MIN/1.73 M 2
GLUCOSE BLD STRIP.AUTO-MCNC: 196 MG/DL (ref 65–99)
GLUCOSE SERPL-MCNC: 141 MG/DL (ref 65–99)
GLUCOSE SERPL-MCNC: 91 MG/DL (ref 65–99)
HCO3 BLDA-SCNC: 14 MMOL/L (ref 17–25)
HCT VFR BLD AUTO: 24.1 % (ref 42–52)
HCT VFR BLD AUTO: 28.3 % (ref 42–52)
HGB BLD-MCNC: 7.1 G/DL (ref 14–18)
HGB BLD-MCNC: 8.5 G/DL (ref 14–18)
INHALED O2 FLOW RATE: ABNORMAL L/MIN
MCH RBC QN AUTO: 27.4 PG (ref 27–33)
MCHC RBC AUTO-ENTMCNC: 29.5 G/DL (ref 32.3–36.5)
MCV RBC AUTO: 93.1 FL (ref 81.4–97.8)
NT-PROBNP SERPL IA-MCNC: ABNORMAL PG/ML (ref 0–125)
PCO2 BLDA: 24.2 MMHG (ref 26–37)
PCO2 TEMP ADJ BLDA: 23.9 MMHG (ref 26–37)
PH BLDA: 7.38 [PH] (ref 7.4–7.5)
PH TEMP ADJ BLDA: 7.38 [PH] (ref 7.4–7.5)
PLATELET # BLD AUTO: 360 K/UL (ref 164–446)
PMV BLD AUTO: 10.2 FL (ref 9–12.9)
PO2 BLDA: 122.7 MMHG (ref 64–87)
PO2 TEMP ADJ BLDA: 120.8 MMHG (ref 64–87)
POTASSIUM SERPL-SCNC: 3.7 MMOL/L (ref 3.6–5.5)
POTASSIUM SERPL-SCNC: 3.9 MMOL/L (ref 3.6–5.5)
PROCALCITONIN SERPL-MCNC: 0.56 NG/ML
RBC # BLD AUTO: 2.59 M/UL (ref 4.7–6.1)
SAO2 % BLDA: 97.7 % (ref 93–99)
SODIUM SERPL-SCNC: 137 MMOL/L (ref 135–145)
SODIUM SERPL-SCNC: 141 MMOL/L (ref 135–145)
TROPONIN T SERPL-MCNC: 1301 NG/L (ref 6–19)
TROPONIN T SERPL-MCNC: 1488 NG/L (ref 6–19)
TROPONIN T SERPL-MCNC: 1646 NG/L (ref 6–19)
TROPONIN T SERPL-MCNC: 1699 NG/L (ref 6–19)
TROPONIN T SERPL-MCNC: 1724 NG/L (ref 6–19)
UFH PPP CHRO-ACNC: 0.27 IU/ML
UFH PPP CHRO-ACNC: 0.46 IU/ML
UFH PPP CHRO-ACNC: 0.49 IU/ML
WBC # BLD AUTO: 6.7 K/UL (ref 4.8–10.8)

## 2023-10-28 PROCEDURE — 97166 OT EVAL MOD COMPLEX 45 MIN: CPT

## 2023-10-28 PROCEDURE — 93005 ELECTROCARDIOGRAM TRACING: CPT | Performed by: NURSE PRACTITIONER

## 2023-10-28 PROCEDURE — 97163 PT EVAL HIGH COMPLEX 45 MIN: CPT

## 2023-10-28 PROCEDURE — 85027 COMPLETE CBC AUTOMATED: CPT

## 2023-10-28 PROCEDURE — 82550 ASSAY OF CK (CPK): CPT

## 2023-10-28 PROCEDURE — 700102 HCHG RX REV CODE 250 W/ 637 OVERRIDE(OP)

## 2023-10-28 PROCEDURE — 770020 HCHG ROOM/CARE - TELE (206)

## 2023-10-28 PROCEDURE — A9270 NON-COVERED ITEM OR SERVICE: HCPCS

## 2023-10-28 PROCEDURE — 99291 CRITICAL CARE FIRST HOUR: CPT | Performed by: INTERNAL MEDICINE

## 2023-10-28 PROCEDURE — 85018 HEMOGLOBIN: CPT

## 2023-10-28 PROCEDURE — 82962 GLUCOSE BLOOD TEST: CPT

## 2023-10-28 PROCEDURE — 84145 PROCALCITONIN (PCT): CPT

## 2023-10-28 PROCEDURE — 51798 US URINE CAPACITY MEASURE: CPT

## 2023-10-28 PROCEDURE — 700111 HCHG RX REV CODE 636 W/ 250 OVERRIDE (IP): Mod: JZ | Performed by: INTERNAL MEDICINE

## 2023-10-28 PROCEDURE — 82803 BLOOD GASES ANY COMBINATION: CPT

## 2023-10-28 PROCEDURE — 700105 HCHG RX REV CODE 258: Performed by: INTERNAL MEDICINE

## 2023-10-28 PROCEDURE — 84484 ASSAY OF TROPONIN QUANT: CPT | Mod: 91

## 2023-10-28 PROCEDURE — 71045 X-RAY EXAM CHEST 1 VIEW: CPT

## 2023-10-28 PROCEDURE — 36415 COLL VENOUS BLD VENIPUNCTURE: CPT

## 2023-10-28 PROCEDURE — 85520 HEPARIN ASSAY: CPT | Mod: 91

## 2023-10-28 PROCEDURE — 700111 HCHG RX REV CODE 636 W/ 250 OVERRIDE (IP): Performed by: STUDENT IN AN ORGANIZED HEALTH CARE EDUCATION/TRAINING PROGRAM

## 2023-10-28 PROCEDURE — 700105 HCHG RX REV CODE 258: Performed by: NURSE PRACTITIONER

## 2023-10-28 PROCEDURE — A9270 NON-COVERED ITEM OR SERVICE: HCPCS | Performed by: INTERNAL MEDICINE

## 2023-10-28 PROCEDURE — 85014 HEMATOCRIT: CPT

## 2023-10-28 PROCEDURE — 700102 HCHG RX REV CODE 250 W/ 637 OVERRIDE(OP): Performed by: INTERNAL MEDICINE

## 2023-10-28 PROCEDURE — 700102 HCHG RX REV CODE 250 W/ 637 OVERRIDE(OP): Performed by: STUDENT IN AN ORGANIZED HEALTH CARE EDUCATION/TRAINING PROGRAM

## 2023-10-28 PROCEDURE — 83880 ASSAY OF NATRIURETIC PEPTIDE: CPT

## 2023-10-28 PROCEDURE — 80048 BASIC METABOLIC PNL TOTAL CA: CPT

## 2023-10-28 PROCEDURE — C9113 INJ PANTOPRAZOLE SODIUM, VIA: HCPCS | Performed by: INTERNAL MEDICINE

## 2023-10-28 PROCEDURE — A9270 NON-COVERED ITEM OR SERVICE: HCPCS | Performed by: STUDENT IN AN ORGANIZED HEALTH CARE EDUCATION/TRAINING PROGRAM

## 2023-10-28 PROCEDURE — 85379 FIBRIN DEGRADATION QUANT: CPT

## 2023-10-28 PROCEDURE — 99233 SBSQ HOSP IP/OBS HIGH 50: CPT | Performed by: INTERNAL MEDICINE

## 2023-10-28 RX ORDER — FUROSEMIDE 10 MG/ML
40 INJECTION INTRAMUSCULAR; INTRAVENOUS
Status: DISCONTINUED | OUTPATIENT
Start: 2023-10-28 | End: 2023-10-31

## 2023-10-28 RX ORDER — FINASTERIDE 5 MG/1
5 TABLET, FILM COATED ORAL DAILY
Status: DISCONTINUED | OUTPATIENT
Start: 2023-10-28 | End: 2023-11-10 | Stop reason: HOSPADM

## 2023-10-28 RX ORDER — MIDODRINE HYDROCHLORIDE 5 MG/1
10 TABLET ORAL ONCE
Status: COMPLETED | OUTPATIENT
Start: 2023-10-28 | End: 2023-10-28

## 2023-10-28 RX ORDER — SODIUM CHLORIDE, SODIUM LACTATE, POTASSIUM CHLORIDE, AND CALCIUM CHLORIDE .6; .31; .03; .02 G/100ML; G/100ML; G/100ML; G/100ML
250 INJECTION, SOLUTION INTRAVENOUS ONCE
Status: COMPLETED | OUTPATIENT
Start: 2023-10-28 | End: 2023-10-28

## 2023-10-28 RX ORDER — METOPROLOL SUCCINATE 25 MG/1
12.5 TABLET, EXTENDED RELEASE ORAL DAILY
Status: DISCONTINUED | OUTPATIENT
Start: 2023-10-28 | End: 2023-11-02

## 2023-10-28 RX ORDER — TAMSULOSIN HYDROCHLORIDE 0.4 MG/1
0.4 CAPSULE ORAL
Status: DISCONTINUED | OUTPATIENT
Start: 2023-10-28 | End: 2023-11-09

## 2023-10-28 RX ADMIN — PANTOPRAZOLE SODIUM 40 MG: 40 INJECTION, POWDER, FOR SOLUTION INTRAVENOUS at 16:22

## 2023-10-28 RX ADMIN — PREDNISONE 5 MG: 5 TABLET ORAL at 05:42

## 2023-10-28 RX ADMIN — HEPARIN SODIUM 1900 UNITS: 1000 INJECTION, SOLUTION INTRAVENOUS; SUBCUTANEOUS at 00:16

## 2023-10-28 RX ADMIN — TAMSULOSIN HYDROCHLORIDE 0.4 MG: 0.4 CAPSULE ORAL at 18:10

## 2023-10-28 RX ADMIN — POLYETHYLENE GLYCOL 3350 1 PACKET: 17 POWDER, FOR SOLUTION ORAL at 16:22

## 2023-10-28 RX ADMIN — HEPARIN SODIUM 16 UNITS/KG/HR: 5000 INJECTION, SOLUTION INTRAVENOUS at 08:51

## 2023-10-28 RX ADMIN — MAGNESIUM HYDROXIDE 30 ML: 1200 LIQUID ORAL at 08:57

## 2023-10-28 RX ADMIN — SODIUM CHLORIDE, POTASSIUM CHLORIDE, SODIUM LACTATE AND CALCIUM CHLORIDE 250 ML: 600; 310; 30; 20 INJECTION, SOLUTION INTRAVENOUS at 21:38

## 2023-10-28 RX ADMIN — LEVOTHYROXINE, LIOTHYRONINE 60 MG: 19; 4.5 TABLET ORAL at 05:42

## 2023-10-28 RX ADMIN — SODIUM CHLORIDE: 9 INJECTION, SOLUTION INTRAVENOUS at 08:54

## 2023-10-28 RX ADMIN — METOPROLOL SUCCINATE 12.5 MG: 25 TABLET, EXTENDED RELEASE ORAL at 16:23

## 2023-10-28 RX ADMIN — HYDROCORTISONE SODIUM SUCCINATE 50 MG: 100 INJECTION, POWDER, FOR SOLUTION INTRAMUSCULAR; INTRAVENOUS at 23:18

## 2023-10-28 RX ADMIN — ROTIGOTINE 4 MG: 4 PATCH, EXTENDED RELEASE TRANSDERMAL at 18:11

## 2023-10-28 RX ADMIN — DOCUSATE SODIUM 50 MG AND SENNOSIDES 8.6 MG 2 TABLET: 8.6; 5 TABLET, FILM COATED ORAL at 16:28

## 2023-10-28 RX ADMIN — PANTOPRAZOLE SODIUM 40 MG: 40 INJECTION, POWDER, FOR SOLUTION INTRAVENOUS at 05:42

## 2023-10-28 RX ADMIN — ASPIRIN 81 MG: 81 TABLET, COATED ORAL at 05:42

## 2023-10-28 RX ADMIN — Medication 10 MG: at 23:19

## 2023-10-28 RX ADMIN — FINASTERIDE 5 MG: 5 TABLET, FILM COATED ORAL at 18:10

## 2023-10-28 RX ADMIN — MIDODRINE HYDROCHLORIDE 10 MG: 5 TABLET ORAL at 23:46

## 2023-10-28 RX ADMIN — SODIUM CHLORIDE: 9 INJECTION, SOLUTION INTRAVENOUS at 00:07

## 2023-10-28 RX ADMIN — HYDROCORTISONE SODIUM SUCCINATE 100 MG: 100 INJECTION, POWDER, FOR SOLUTION INTRAMUSCULAR; INTRAVENOUS at 09:53

## 2023-10-28 RX ADMIN — SODIUM CHLORIDE, POTASSIUM CHLORIDE, SODIUM LACTATE AND CALCIUM CHLORIDE 250 ML: 600; 310; 30; 20 INJECTION, SOLUTION INTRAVENOUS at 21:57

## 2023-10-28 RX ADMIN — FUROSEMIDE 40 MG: 10 INJECTION, SOLUTION INTRAVENOUS at 16:22

## 2023-10-28 RX ADMIN — DOCUSATE SODIUM 50 MG AND SENNOSIDES 8.6 MG 2 TABLET: 8.6; 5 TABLET, FILM COATED ORAL at 05:42

## 2023-10-28 RX ADMIN — HYDROCORTISONE SODIUM SUCCINATE 100 MG: 100 INJECTION, POWDER, FOR SOLUTION INTRAMUSCULAR; INTRAVENOUS at 13:39

## 2023-10-28 ASSESSMENT — ENCOUNTER SYMPTOMS
CHILLS: 0
DIARRHEA: 0
NAUSEA: 0
SHORTNESS OF BREATH: 0
SINUS PAIN: 0
FEVER: 0
COUGH: 0
VOMITING: 0
MYALGIAS: 1
FALLS: 1
ABDOMINAL PAIN: 0

## 2023-10-28 ASSESSMENT — FIBROSIS 4 INDEX
FIB4 SCORE: 3.17
FIB4 SCORE: 3.17

## 2023-10-28 ASSESSMENT — COGNITIVE AND FUNCTIONAL STATUS - GENERAL
SUGGESTED CMS G CODE MODIFIER DAILY ACTIVITY: CL
MOVING TO AND FROM BED TO CHAIR: UNABLE
MOBILITY SCORE: 8
WALKING IN HOSPITAL ROOM: A LOT
DAILY ACTIVITIY SCORE: 12
MOVING FROM LYING ON BACK TO SITTING ON SIDE OF FLAT BED: UNABLE
SUGGESTED CMS G CODE MODIFIER MOBILITY: CM
TURNING FROM BACK TO SIDE WHILE IN FLAT BAD: UNABLE
DRESSING REGULAR LOWER BODY CLOTHING: A LOT
EATING MEALS: A LOT
STANDING UP FROM CHAIR USING ARMS: A LOT
CLIMB 3 TO 5 STEPS WITH RAILING: TOTAL
TOILETING: A LOT
PERSONAL GROOMING: A LOT
DRESSING REGULAR UPPER BODY CLOTHING: A LOT
HELP NEEDED FOR BATHING: A LOT

## 2023-10-28 ASSESSMENT — COPD QUESTIONNAIRES
COPD SCREENING SCORE: 2
DO YOU EVER COUGH UP ANY MUCUS OR PHLEGM?: NO/ONLY WITH OCCASIONAL COLDS OR INFECTIONS
DURING THE PAST 4 WEEKS HOW MUCH DID YOU FEEL SHORT OF BREATH: NONE/LITTLE OF THE TIME
HAVE YOU SMOKED AT LEAST 100 CIGARETTES IN YOUR ENTIRE LIFE: NO/DON'T KNOW

## 2023-10-28 ASSESSMENT — ACTIVITIES OF DAILY LIVING (ADL): TOILETING: INDEPENDENT

## 2023-10-28 ASSESSMENT — GAIT ASSESSMENTS: GAIT LEVEL OF ASSIST: UNABLE TO PARTICIPATE

## 2023-10-28 NOTE — PROGRESS NOTES
Bedside report received and patient care assumed. Pt is resting in bed, A&O4, with no complaints of pain, and is on 1.5L NC. Tele box on. All fall precautions are in place, belongings at bedside table.  Pt was updated on POC, no questions or concerns. Pt educated on use of call light for assistance.

## 2023-10-28 NOTE — THERAPY
Physical Therapy   Initial Evaluation     Patient Name: Elias Casanova  Age:  82 y.o., Sex:  male  Medical Record #: [id number]  Today's Date: 10/28/2023     Precautions: Fall Risk    Assessment  Patient is an 82 y.o. male transferred from OSH for cardiology consult d/t elevated troponin. Pt initially presenting to OSH after GLF and c/o weakness for a few days prior. Pt dx NSTEMI and rhabdo. Pmhx includes a fib and Parkinson's. Pt seen for PT evaluation at this time. Pt is motivated to participate but requires incr time/effort to mobilize and requires repeated cues for safety and sequencing. Pt presents well below his baseline and demos impairments in strength, coordination, balance, and activity tolerance. Pt typically indep with 4WW, today required mod A for multiple STS for lift off, anterior weight shift, and stability. Required mod A for SPT with FWW from EOB to chair with shuffled steps and inability to weight shift on his own. Demos posterior LOB in standing and fatigue after short activity. Pt would benefit from postacute placement at [facility] to maximize safety and functional independence prior to return home. PT will follow. Encouraged OOB to chair with nursing to promote inr activity tolerance.     Also of note, pt demonstrates gurgled speech and is difficult to understand. Pt with incr WOB while eating and some food and drink tend to spill out of his mouth. Pt with incr coughing while eating. Notified RN for consideration of SLP swallow evaluation.       Plan   Treatment Plan : Bed Mobility, Gait Training, Neuro Re-Education / Balance, Self Care / Home Evaluation, Stair Training, Therapeutic Activities, Therapeutic Exercise  Treatment Frequency: 4 Times per Week  Duration: Until Therapy Goals Met    DC Equipment Recommendations: Unable to determine at this time  Discharge Recommendations: Recommend post-acute placement for additional physical therapy services prior to discharge home     10/28/23 4815   Prior  Living Situation   Prior Services None   Housing / Facility 2 Story House   Steps Into Home ramp   Steps In Home FOS but pt stays on main level   Bathroom Set up Bathtub / Shower Combination   Equipment Owned 4-Wheel Walker   Lives with -  Friends   Comments reports has two roommates but that they do not assist pt. sister present at end of eval but lives in [facility] and cannot speak to pt's living situation   Prior Level of Functional Mobility   Bed Mobility Independent   Transfer Status Independent   Ambulation Independent   Ambulation Distance household - limited community   Assistive Devices Used 4-Wheel Walker   Comments per pt report was indep with all mobility and ADLs   Cognition    Speech/ Communication Delayed Responses;Dysarthric   Ability To Follow Commands 1 Step   Comments difficult to understand, very slurred and sounds gurgley. pt pleasant and motivated, but needs incr cueing for sequencing and repeated cues for continuation of task.   Active ROM Lower Body    Comments cannot achieve full knee ext   Strength Lower Body   Comments generalized weakness   Balance Assessment   Sitting Balance (Static) Fair   Sitting Balance (Dynamic) Fair -   Standing Balance (Static) Poor +   Standing Balance (Dynamic) Poor   Weight Shift Sitting Poor   Weight Shift Standing Poor   Comments standing with FWW   Bed Mobility    Supine to Sit Moderate Assist   Scooting Moderate Assist   Gait Analysis   Gait Level Of Assist Unable to Participate   Comments incr effort and very shuffled steps for SPT from EOB to chair with FWW   Functional Mobility   Sit to Stand Moderate Assist   Bed, Chair, Wheelchair Transfer Moderate Assist   Transfer Method Stand Pivot   Mobility EOB > chair with FWW, likely would do better with HHA d/t difficulty turning and needing mod A for stability and weight shifting   Short Term Goals    Short Term Goal # 1 pt will perform supine <> sit with SPV in 6 visits for improved independence    Short Term Goal # 2 pt will perform all functional xfrs with SPV in 6 visits for improved independence   Short Term Goal # 3 pt will ambulate 50ft with FWW and SPV in 6 visits for improved independence

## 2023-10-28 NOTE — THERAPY
"Occupational Therapy   Initial Evaluation     Patient Name: Elias Casanova  Age:  82 y.o., Sex:  male  Medical Record #: [id number]  Today's Date: 10/28/2023    Precautions: Fall Risk, Other (See Comments)  Comments: hx of PD    Assessment  Patient is 82 y.o. male admitted for NSTEMI and rhabdo, txf from OSH. PMHx: PD, HTN, pulmonary HTN, Afib, and osteoprosis. Pt reports he is independnet w/ADLs prior to admission, pt did appear to be a questionable historian during session will need for confirm PLOF.   This admission pt is dx NSTEMI, anemia, rhabdomyolysis and persisteent Afib. Today pt presents w/significant debility, exacerbation of PD, limited activity tolernace and impaired balance ipmacting participation in ADL's. OT will follow in this setting currenlty recommend post acute placement. Pt was also observed to be coughing and chocking during his meal may benefit from SLP eval given underlying PD and new weakness.       Plan  Occupational Therapy Initial Treatment Plan   Treatment Interventions: Self Care / Activities of Daily Living, Adaptive Equipment, Manual Therapy Techniques, Neuro Re-Education / Balance, Therapeutic Exercises, Therapeutic Activity  Treatment Frequency: 4 Times per Week  Duration: Until Therapy Goals Met    DC Equipment Recommendations: Unable to determine at this time  Discharge Recommendations: Recommend post-acute placement for additional occupational therapy services prior to discharge home     Subjective  \"I am thirsty\"      Objective     10/28/23 1458   Charge Group   OT Evaluation OT Evaluation Mod   Total Time Spent   OT Time Spent Yes   OT Evaluation (Minutes) 24   OT Total Time Spent (Calculated) 24   Initial Contact Note    Initial Contact Note Order Received and Verified, Occupational Therapy Evaluation in Progress with Full Report to Follow.   Prior Living Situation   Prior Services None   Housing / Facility 2 Story House   Steps Into Home   (ramp?)   Bathroom Set up Bathtub / " Shower Combination   Equipment Owned Front-Wheel Walker   Lives with - Patient's Self Care Capacity Friends   Comments pt reports he lives w/roomates who do not help much; unclear if pt hs a reliable hx need to confirm   Prior Level of ADL Function   Self Feeding Independent   Grooming / Hygiene Independent   Bathing Independent   Dressing Independent   Toileting Independent   Comments per pt report need to confirm   Prior Level of IADL Function   Medication Management Independent   Comments pt reports roomates sometimes assist   History of Falls   History of Falls Yes   Precautions   Precautions Fall Risk;Other (See Comments)   Comments hx of PD   Pain 0 - 10 Group   Therapist Pain Assessment Nurse Notified;0   Cognition    Cognition / Consciousness X   Speech/ Communication Delayed Responses;Dysarthric   Ability To Follow Commands 1 Step   Safety Awareness Impaired   New Learning Impaired   Comments limited insight diffcult to understand   Passive ROM Upper Body   Passive ROM Upper Body WDL   Active ROM Upper Body   Active ROM Upper Body  X   Comments limited overhead by weakness   Strength Upper Body   Upper Body Strength  X   Gross Strength Generalized Weakness, Equal Bilaterally.    Coordination Upper Body   Coordination X   Comments grossly impaired d/t PD   Balance Assessment   Sitting Balance (Static) Fair   Sitting Balance (Dynamic) Fair -   Standing Balance (Static) Poor +   Standing Balance (Dynamic) Poor   Weight Shift Sitting Poor   Weight Shift Standing Poor   Comments w/fww   Bed Mobility    Supine to Sit Moderate Assist   ADL Assessment   Eating Minimal Assist   Grooming Minimal Assist;Seated   Upper Body Dressing Moderate Assist   Lower Body Dressing Maximal Assist   Toileting Total Assist   How much help from another person does the patient currently need...   6 Clicks Daily Activity Score 12   Functional Mobility   Sit to Stand Moderate Assist   Bed, Chair, Wheelchair Transfer Moderate Assist    Mobility EOB sit>Stand x2 pivot to chair   Activity Tolerance   Comments appeared to fatigue w/upright postures   Patient / Family Goals   Patient / Family Goal #1 to get better   Short Term Goals   Short Term Goal # 1 pt will compelete grooming standing at sink w/spv   Short Term Goal # 2 pt will complete LB dressing w/spv   Short Term Goal # 3 pt will toilet txf w/spv   Education Group   Role of Occupational Therapist Patient Response Patient;Acceptance;Explanation;Demonstration   Occupational Therapy Initial Treatment Plan    Treatment Interventions Self Care / Activities of Daily Living;Adaptive Equipment;Manual Therapy Techniques;Neuro Re-Education / Balance;Therapeutic Exercises;Therapeutic Activity   Treatment Frequency 4 Times per Week   Duration Until Therapy Goals Met   Problem List   Problem List Decreased Active Daily Living Skills;Decreased Upper Extremity Strength Right;Decreased Upper Extremity Strength Left;Decreased Functional Mobility;Decreased Activity Tolerance;Safety Awareness Deficits / Cognition;Impaired Postural Control / Balance;Impaired Coordination Right Upper Extremity;Impaired Coordination Left Upper Extremity;Decreased Upper Extremity AROM Right;Decreased Upper Extremity AROM Left;Impaired Cognitive Function   Anticipated Discharge Equipment and Recommendations   DC Equipment Recommendations Unable to determine at this time   Discharge Recommendations Recommend post-acute placement for additional occupational therapy services prior to discharge home   Interdisciplinary Plan of Care Collaboration   IDT Collaboration with  Nursing;Physical Therapist   Patient Position at End of Therapy Seated;Chair Alarm On;Call Light within Reach;Tray Table within Reach;Phone within Reach   Collaboration Comments RN aware of OT eval and pts efforts   Session Information   Date / Session Number  10/28 #1 (1/4, 11/3)

## 2023-10-28 NOTE — CARE PLAN
The patient is Watcher - Medium risk of patient condition declining or worsening    Shift Goals  Clinical Goals: VSS, anti xa, safety  Patient Goals: sleep, comfort  Family Goals: updates    Progress made toward(s) clinical / shift goals:    Problem: Knowledge Deficit - Standard  Goal: Patient and family/care givers will demonstrate understanding of plan of care, disease process/condition, diagnostic tests and medications  Outcome: Progressing     Problem: Skin Integrity  Goal: Skin integrity is maintained or improved  Outcome: Progressing     Problem: Fall Risk  Goal: Patient will remain free from falls  Outcome: Progressing     Problem: Fluid Volume  Goal: Fluid volume balance will be maintained  Outcome: Progressing     Problem: Nutrition  Goal: Patient's nutritional and fluid intake will be adequate or improve  Outcome: Progressing       Patient is not progressing towards the following goals:      Problem: Urinary Elimination  Goal: Establish and maintain regular urinary output  Outcome: Not Progressing     Problem: Bowel Elimination  Goal: Establish and maintain regular bowel function  Outcome: Not Progressing     Problem: Mobility  Goal: Patient's capacity to carry out activities will improve  Outcome: Not Progressing     Problem: Self Care  Goal: Patient will have the ability to perform ADLs independently or with assistance (bathe, groom, dress, toilet and feed)  Outcome: Not Progressing

## 2023-10-28 NOTE — PROGRESS NOTES
Hospital Medicine Daily Progress Note    Date of Service  10/28/2023    Chief Complaint  Elias Casanova is a 82 y.o. male admitted 10/26/2023 with NSTEMI, rhabdomyolysis     Hospital Course  This is a  82 y.o. male who was transferred from outside facility 10/26/2023 with rhabdomyolysis and elevated troponin.  PMH of A-fib on anticoagulation, Parkinson disease, hypertension, pulmonary hypertension  He was found on the ground, says he had fallen out of bed, he does not believe he lost consciousness.  He was too weak to get back up and thinks he was on the ground for about 12 hours or more.  He is found to have rhabdomyolysis at outside facility with CPK of 1100. He was also found to have elevated troponin and was sent here for high level of care     Interval Problem Update  Patient seen and examined, weak tirred, denie chest pain, troponin trending up, and his BP is also low. I held his metoprolol today as his SBP went as low as 70.   His CPK also trending up so he has been started on IV fluid, but will need to be carefull as patient has already lower extremity edema.  Cardiology was consulted for concern for NSTEMI and he has been started on heparin drip, an echo has been ordered   His sister is at bed side and we discussed with his sister and patient regarding goals of care code status has been changed to DNR/DNI   Initially declined echo but sister said will talk and try to convince him to have the echo   Prognosis is really guarded at this time   Restarted back his parkinson medication   10/28: Patient seen and examined, daughter at bedside updated on gals of care.  Cardiology following case discussed with Dr. Kelly , echo pending, troponin up to around 1600. Cont on heparin drip.  Hypotension, he has h/o of adrenal insufficiency with give IV hydrocortisone for stress dose steroids  CPK around 1100 today close monitoring for renal failure   Prognosis guarded   I have discussed this patient's plan of care and  discharge plan at IDT rounds today with Case Management, Nursing, Nursing leadership, and other members of the IDT team.    Consultants/Specialty  cardiology    Code Status  DNAR/DNI    Disposition  The patient is not medically cleared for discharge to home or a post-acute facility.      I have placed the appropriate orders for post-discharge needs.    Review of Systems  Review of Systems   Constitutional:  Negative for chills and fever.   HENT:  Negative for congestion, nosebleeds and sinus pain.    Respiratory:  Negative for cough and shortness of breath.    Cardiovascular:  Negative for chest pain.   Gastrointestinal:  Negative for abdominal pain, diarrhea, nausea and vomiting.   Genitourinary:  Negative for dysuria and urgency.   Musculoskeletal:  Positive for falls and myalgias.   All other systems reviewed and are negative.       Physical Exam  Temp:  [36.3 °C (97.3 °F)-36.7 °C (98.1 °F)] 36.7 °C (98.1 °F)  Pulse:  [] 108  Resp:  [15-19] 15  BP: ()/(54-86) 119/86  SpO2:  [94 %-100 %] 94 %    Physical Exam  Constitutional:       Appearance: Normal appearance.   HENT:      Head: Normocephalic and atraumatic.      Mouth/Throat:      Mouth: Mucous membranes are moist.      Pharynx: Oropharynx is clear. No oropharyngeal exudate or posterior oropharyngeal erythema.   Eyes:      General: No scleral icterus.  Cardiovascular:      Rate and Rhythm: Normal rate and regular rhythm.      Pulses: Normal pulses.      Heart sounds: Normal heart sounds. No murmur heard.  Pulmonary:      Effort: Pulmonary effort is normal. No respiratory distress.      Breath sounds: Normal breath sounds. No wheezing.   Abdominal:      Palpations: Abdomen is soft.      Tenderness: There is no abdominal tenderness.   Musculoskeletal:         General: No swelling or tenderness. Normal range of motion.      Cervical back: Normal range of motion.   Skin:     General: Skin is warm and dry.   Neurological:      General: No focal deficit  present.      Mental Status: He is alert and oriented to person, place, and time. Mental status is at baseline.   Psychiatric:         Mood and Affect: Mood normal.         Fluids    Intake/Output Summary (Last 24 hours) at 10/28/2023 1443  Last data filed at 10/28/2023 0201  Gross per 24 hour   Intake 350 ml   Output 600 ml   Net -250 ml       Laboratory  Recent Labs     10/26/23  1518 10/27/23  0004 10/27/23  0914 10/28/23  0718 10/28/23  1308   WBC 8.9 8.5  --  6.7  --    RBC 3.01* 2.91*  --  2.59*  --    HEMOGLOBIN 8.4* 8.0* 9.5* 7.1* 8.5*   HEMATOCRIT 27.8* 27.2* 32.6* 24.1* 28.3*   MCV 92.4 93.5  --  93.1  --    MCH 27.9* 27.5  --  27.4  --    MCHC 30.2* 29.4*  --  29.5*  --    RDW 15.9* 54.2*  --  54.1*  --    PLATELETCT 362 432  --  360  --    MPV 10.5* 10.4  --  10.2  --        Recent Labs     10/26/23  1518 10/27/23  0004 10/28/23  0933   SODIUM 138 140 141   POTASSIUM 4.6 4.4 3.7   CHLORIDE 111* 109 112   CO2 14* 17* 16*   GLUCOSE 91 89 91   BUN 40* 37* 44*   CREATININE 1.1 1.26 1.40   CALCIUM 8.4* 8.5 7.9*       Recent Labs     10/27/23  0316   APTT 74.5*   INR 2.02*           Recent Labs     10/27/23  0004   TRIGLYCERIDE 20   HDL 32*   LDL 59         Imaging  EC-ECHOCARDIOGRAM COMPLETE W/O CONT    (Results Pending)        Assessment/Plan  * NSTEMI (non-ST elevated myocardial infarction) (HCC)- (present on admission)  Assessment & Plan  Troponin significantly elevated at outside facility and trending up, in the 600s here  EKG from transferring facility not concerning for acute ischemia, repeat EKG here ordered  Patient fell out of bed and was unable to get up, he does have a history of Parkinson's disease.  Unclear if he had a coronary event that resulted in him falling  I have consulted cardiology and discussed case with Dr. Kelly   Will continue on heparin drip for now, echo has been ordered since troponin trending up   No cardiac cath at this time as patient not a good candidate for it.  He declined  echo initially now family at bedside and we had discussion, with patient sister who will discuss with him again to see if she can convince him to have the echo   close monitoring on tele     Advance care planning  Assessment & Plan  His sister is now at bedside and we had a long discussion with patient and his sister regarding goals of care.  Since patient has declined echo today we discussed regarding code status and also comfort care and hospice  Will change patient to DNR/DNI for now , no hospice or comfort care  Sister will talk and try to convince him to see if he is willing to have the echo   More then 18 min spent on discussion for goals of care alone       Anemia- (present on admission)  Assessment & Plan  Baseline hemoglobin 12-14.  Was 8.9 on presentation.  No signs of GI bleed, unclear etiology  He is on dabigatran due to history of A-fib  And is now on heparin drip  Has been started on PPI will check a occult blood stool     Non-traumatic rhabdomyolysis- (present on admission)  Assessment & Plan  CPK continues to trend up starting IV fluid but need to be careful  as patient is edematous   This was also discussed with family     Persistent atrial fibrillation (HCC)- (present on admission)  Assessment & Plan  Follows with cardiology, holding metoprolol as his BP is low   Holding pradaxa as he is on heparin drip     Parkinson disease- (present on admission)  Assessment & Plan  Cont outpatient regimen   PT, OT ordered         The very real possibilty of a deterioration of this patient's condition required the highest level of my preparedness for sudden, emergent intervention.  I provided services, which included medication orders, frequent reevaluations of the patient's condition and response to treatment, ordering and reviewing test results, and discussing the case with various consultants.  The time associated with the care of the patient was 51 minutes.      VTE prophylaxis: heparin drip     I have  performed a physical exam and reviewed and updated ROS and Plan today (10/28/2023). In review of yesterday's note (10/27/2023), there are no changes except as documented above.

## 2023-10-28 NOTE — CARE PLAN
The patient is Stable - Low risk of patient condition declining or worsening    Shift Goals  Clinical Goals: VSS, anti xa, safety  Patient Goals: sleep, comfort  Family Goals: updates    Progress made toward(s) clinical / shift goals:  yes    Patient is not progressing towards the following goals:      Problem: Knowledge Deficit - Standard  Goal: Patient and family/care givers will demonstrate understanding of plan of care, disease process/condition, diagnostic tests and medications  10/28/2023 0938 by Sumit Guerrero R.N.  Outcome: Progressing  10/28/2023 0938 by Sumit Guerrero R.N.  Outcome: Progressing  10/28/2023 0935 by Sumit Guerrero R.N.  Outcome: Progressing     Problem: Skin Integrity  Goal: Skin integrity is maintained or improved  10/28/2023 0938 by Sumit Guerrero R.N.  Outcome: Progressing  10/28/2023 0938 by Sumit Guerrero R.N.  Outcome: Progressing  10/28/2023 0935 by Sumit Guerrero R.N.  Outcome: Progressing     Problem: Fall Risk  Goal: Patient will remain free from falls  10/28/2023 0938 by Sumit Guerrero R.N.  Outcome: Progressing  10/28/2023 0938 by Sumit Guerrero R.N.  Outcome: Progressing  10/28/2023 0935 by Sumit Guerrero R.N.  Outcome: Progressing     Problem: Fluid Volume  Goal: Fluid volume balance will be maintained  10/28/2023 0938 by Sumit Guerrero R.N.  Outcome: Progressing  10/28/2023 0938 by Sumit Guerrero R.N.  Outcome: Progressing  10/28/2023 0935 by Sumit Guerrero R.N.  Outcome: Progressing     Problem: Nutrition  Goal: Patient's nutritional and fluid intake will be adequate or improve  10/28/2023 0938 by Sumit Guerrero R.N.  Outcome: Progressing  10/28/2023 0938 by Sumit Guerrero R.N.  Outcome: Progressing  10/28/2023 0935 by Sumit Guerrero R.N.  Outcome: Progressing  Goal: Enteral nutrition will be maintained or improve  10/28/2023 0938 by Sumit Guerrero R.N.  Outcome: Progressing  10/28/2023 0938 by Sumit Guerrero R.N.  Outcome:  Progressing  10/28/2023 0935 by Sumit Guerrero R.N.  Outcome: Progressing  Goal: Enteral nutrition will be maintained or improve  10/28/2023 0938 by Sumit Guerrero R.N.  Outcome: Progressing  10/28/2023 0938 by Sumit Guerrero R.N.  Outcome: Progressing  10/28/2023 0935 by Sumit Guerrero R.N.  Outcome: Progressing     Problem: Urinary Elimination  Goal: Establish and maintain regular urinary output  10/28/2023 0938 by Sumit Guerrero R.N.  Outcome: Progressing  10/28/2023 0938 by Sumit Guerrero R.N.  Outcome: Progressing  10/28/2023 0935 by Sumit Guerrero R.N.  Outcome: Progressing     Problem: Bowel Elimination  Goal: Establish and maintain regular bowel function  10/28/2023 0938 by Sumit Guerrero R.N.  Outcome: Progressing  10/28/2023 0938 by Sumit Guerrero R.N.  Outcome: Progressing  10/28/2023 0935 by Sumit Guerrero R.N.  Outcome: Progressing     Problem: Rectal Tube  Goal: Fecal output will be contained and skin will remain free from irritation  10/28/2023 0938 by Sumit Guerrero R.N.  Outcome: Progressing  10/28/2023 0938 by Sumit Guerrero R.N.  Outcome: Progressing  10/28/2023 0935 by Sumit Guerrero R.N.  Outcome: Progressing     Problem: Mobility  Goal: Patient's capacity to carry out activities will improve  10/28/2023 0938 by Sumit Guerrero R.N.  Outcome: Progressing  10/28/2023 0938 by Sumit Guerrero R.N.  Outcome: Progressing  10/28/2023 0935 by Sumit Guerrero R.N.  Outcome: Progressing     Problem: Self Care  Goal: Patient will have the ability to perform ADLs independently or with assistance (bathe, groom, dress, toilet and feed)  10/28/2023 0938 by Sumit Guerrero R.N.  Outcome: Progressing  10/28/2023 0938 by Sumit Guerrero R.N.  Outcome: Progressing  10/28/2023 0935 by Sumit Guerrero R.N.  Outcome: Progressing

## 2023-10-28 NOTE — PROGRESS NOTES
Cardiology Follow-up Consult Note    Date of Service:    10/28/2023      Consulting Physician: Stefan Davis M.D.    Name:   Elias Casanova   YOB: 1940  Age:   82 y.o.  male   MRN:   [id number]    Subjective:  Patient denies any chest pain, breathing is okay, he is having some heel pain.    Patient refused echo yesterday.  He reports he would be agreeable to having the echo done today.    Trop 651 on admission, then 1331, then 1313, and last one 1318.    All other review of systems reviewed and negative.      Past medical, surgical, social, and family history reviewed and unchanged from admission except as noted in assessment and plan.    Medications Prior to Admission   Medication Sig Dispense Refill Last Dose    vitamin D3 (CHOLECALCIFEROL) 1000 Unit (25 mcg) Tab Take 1,000 Units by mouth every day.   UNK at UNK    Melatonin 10 MG Tab Take 10 mg by mouth every day.   UNK at UNK    NP THYROID 60 MG Tab take one tablet by mouth every morning monday through saturday, take two tablets by mouth on sundays only (Patient taking differently: Take  mg by mouth every day. take one tablet by mouth every morning monday through saturday, take two tablets by mouth on sundays only) 120 Tablet 0 UNK at UNK    PRADAXA 150 MG Cap capsule TAKE ONE CAPSULE BY MOUTH TWICE DAILY (Patient taking differently: Take 150 mg by mouth 2 times a day.) 180 Capsule 3 UNK at UNK    potassium chloride SA (KDUR) 20 MEQ Tab CR Take 20 mEq by mouth every day.   UNK at UNK    metoprolol SR (TOPROL XL) 25 MG TABLET SR 24 HR Take 0.5 Tablets by mouth every day. 30 Tablet 3 UNK at UNK    predniSONE (DELTASONE) 5 MG Tab Take 1 tab once daily po (Patient taking differently: Take 5 mg by mouth every day. Take 1 tab once daily po) 90 Tablet 1 UNK at UNK    NEUPRO 4 MG/24HR PATCH 24 HR Place 1 Patch on the skin every day.   UNK at UNK    DHEA 25 MG Tab Take 1 Tablet by mouth every day.   UNK at UNK    Coenzyme Q10 (COQ-10) 100 MG Cap CR  Take 100 mg by mouth every day.   UNK at UNK    DOPamine HCl Powder Take 1 Dose by mouth every day.   UNK at UNK    Tyrosine Powder Take 2.5 g by mouth every day.   UNK at UNK    Cysteine 500 MG Cap Take 500 mg by mouth every day.   UNK at UNK     Current Facility-Administered Medications   Medication Dose Frequency Provider Last Rate Last Admin    heparin infusion 25,000 units in 500 mL 0.45% NACL  0-30 Units/kg/hr Continuous Mayuri Pickett M.D. 19.8 mL/hr at 10/28/23 0016 16 Units/kg/hr at 10/28/23 0016    heparin injection 1,900 Units  30 Units/kg PRN Mayuri Pickett M.D.   1,900 Units at 10/28/23 0016    aspirin EC tablet 81 mg  81 mg DAILY Mayuri Pickett M.D.   81 mg at 10/27/23 0518    [Held by provider] metoprolol SR (Toprol XL) tablet 12.5 mg  12.5 mg DAILY Mayuri Pickett M.D.        NS infusion 500 mL  500 mL Once Stefan Davis M.D.   Held at 10/27/23 0815    pantoprazole (Protonix) injection 40 mg  40 mg BID Stefan Davis M.D.   40 mg at 10/27/23 1707    predniSONE (Deltasone) tablet 5 mg  5 mg DAILY Stefan Davis M.D.   5 mg at 10/27/23 1707    melatonin tablet 10 mg  10 mg Nightly Stefan Davis M.D.   10 mg at 10/27/23 2149    thyroid ([place] Thyroid) tablet 60 mg  60 mg Once per day on Mon Tue Wed Thu Fri Sat Stefan Davis M.D.        And    [START ON 10/29/2023] thyroid ([place] Thyroid) tablet 120 mg  120 mg Once per day on Sun Stefan Davis M.D.        senna-docusate (Pericolace Or Senokot S) 8.6-50 MG per tablet 2 Tablet  2 Tablet BID Mayuri Pickett M.D.   2 Tablet at 10/27/23 1708    And    polyethylene glycol/lytes (Miralax) PACKET 1 Packet  1 Packet QDAY PRN Munadel A Nieves, M.D.        And    magnesium hydroxide (Milk Of Magnesia) suspension 30 mL  30 mL QDAY PRN Mayuri Pickett M.D.        And    bisacodyl (Dulcolax) suppository 10 mg  10 mg QDAY PRN Mayuri Pickett M.D.        NS infusion   Continuous Stefan Davis M.D. 150 mL/hr at 10/28/23 0007 New Bag at 10/28/23 0007    acetaminophen (Tylenol) tablet 650 mg   650 mg Q6HRS PRN Mayuri Pickett M.D.       Last reviewed on 10/27/2023  1:03 PM by Mari Hall     Allergies   Allergen Reactions    Novocain Unspecified     Eye swelling and redness         Intake/Output Summary (Last 24 hours) at 10/28/2023 0524  Last data filed at 10/28/2023 0201  Gross per 24 hour   Intake 350 ml   Output 600 ml   Net -250 ml        Physical Exam  Body mass index is 25.52 kg/m².  /84   Pulse 97   Temp 36.4 °C (97.5 °F) (Temporal)   Resp 19   Wt 73.9 kg (162 lb 14.7 oz)   SpO2 98%   Vitals:    10/27/23 1959 10/27/23 2350 10/28/23 0414 10/28/23 0430   BP: (!) 80/54 92/71  120/84   Pulse: (!) 104 (!) 102  97   Resp: 19 18  19   Temp: 36.7 °C (98.1 °F) 36.4 °C (97.5 °F)  36.4 °C (97.5 °F)   TempSrc: Temporal Temporal  Temporal   SpO2: 96% 100%  98%   Weight:   73.9 kg (162 lb 14.7 oz) 73.9 kg (162 lb 14.7 oz)     Oxygen Therapy:  Pulse Oximetry: 98 %, O2 (LPM): 2, O2 Delivery Device: Silicone Nasal Cannula    Physical Exam  Constitutional:       Appearance: Normal appearance.      Comments: Frail   Neck:      Vascular: No JVD.   Cardiovascular:      Rate and Rhythm: Normal rate. Rhythm irregular.      Pulses: Normal pulses and intact distal pulses.      Heart sounds: Normal heart sounds, S1 normal and S2 normal. No murmur heard.     No friction rub. No S3 or S4 sounds.   Pulmonary:      Effort: Pulmonary effort is normal. No respiratory distress.      Breath sounds: Examination of the right-lower field reveals rhonchi. Examination of the left-lower field reveals rhonchi. Rhonchi present. No wheezing or rales.   Abdominal:      General: Bowel sounds are normal.      Palpations: Abdomen is soft.   Musculoskeletal:      Cervical back: Neck supple.      Right lower leg: Edema present.      Left lower leg: Edema present.   Skin:     General: Skin is warm and dry.   Neurological:      Mental Status: He is alert.         Labs (personally reviewed and notable for):   Lab Results  "  Component Value Date/Time    SODIUM 140 10/27/2023 12:04 AM    POTASSIUM 4.4 10/27/2023 12:04 AM    CHLORIDE 109 10/27/2023 12:04 AM    CO2 17 (L) 10/27/2023 12:04 AM    GLUCOSE 89 10/27/2023 12:04 AM    BUN 37 (H) 10/27/2023 12:04 AM    CREATININE 1.26 10/27/2023 12:04 AM      Lab Results   Component Value Date/Time    WBC 8.5 10/27/2023 12:04 AM    RBC 2.91 (L) 10/27/2023 12:04 AM    HEMOGLOBIN 9.5 (L) 10/27/2023 09:14 AM    HEMATOCRIT 32.6 (L) 10/27/2023 09:14 AM    MCV 93.5 10/27/2023 12:04 AM    MCH 27.5 10/27/2023 12:04 AM    MCHC 29.4 (L) 10/27/2023 12:04 AM    MPV 10.4 10/27/2023 12:04 AM      Lab Results   Component Value Date/Time    CHOLSTRLTOT 95 (L) 10/27/2023 12:04 AM    LDL 59 10/27/2023 12:04 AM    HDL 32 (A) 10/27/2023 12:04 AM    TRIGLYCERIDE 20 10/27/2023 12:04 AM       Lab Results   Component Value Date/Time    TROPONINT 1318 (H) 10/27/2023 2159     No results found for: \"NTPROBNP\"    Cardiac Imaging and Procedures Review:    Patient refused echo yesterday    Telemetry Reviewed with Monitor Tech: Atrial fibrillation 90s to 100    Radiology test Review:  No orders to display     Troponins initially was 651, then up to 1331 and most recently 1646    Impression and Medical Decision Making:  Non-ST elevation myocardial infarction, likely type II although troponins have trended up and is much higher now.  Patient currently still does not endorse chest discomfort.  Due to his multiple comorbidities and his frailty, he is not a good candidate for cardiac catheterization.  He is also anemic.  Would try medical treatment.  Patient also expressed wishes that he did not want anything invasive.  Continue heparin drip for another 24 hours and discontinue tomorrow for total of 48 hours  Continue with enteric-coated aspirin 81 mg p.o. daily, hold off adding clopidogrel.  Patient is agreeable for echo today, echo reordered.  Fluid overloaded.  Suspect LVEF might be down.  We will try and give a low-dose of " Lasix.  However, blood pressure currently is a little bit low not sure if it secondary to adrenal insufficiency and he needs stress dose steroids.  We will try and get his blood pressure up a little bit and try low-dose Lasix.  Atrial fibrillation, mostly rate controlled, on metoprolol succinate, being held due to low blood pressure.  Continue with metoprolol as blood pressure will tolerate it.  Patient is on outpatient Pradaxa 150 mg p.o. twice daily which is being held.  Please consider resuming it when it safe from the anemia standpoint.  Hypotension, questionable whether due to adrenal insufficiency.  I do not think it is cardiogenic shock.  Consider stress dose steroids.    Thank you for allowing me to participate in the care of Mr. Casanova.  Please do not hesitate to contact me with questions or concerns.    Jannet Kelly MD  Cardiologist, [facility]    Please note that this dictation was created using voice recognition software. I have made every reasonable attempt to correct obvious errors, but it is possible there are errors of grammar and possibly content that I did not discover before finalizing the note.

## 2023-10-28 NOTE — CARE PLAN
The patient is Stable - Low risk of patient condition declining or worsening    Shift Goals  Clinical Goals: VSS, anti xa, safety  Patient Goals: sleep, comfort  Family Goals: updates    Progress made toward(s) clinical / shift goals:  yes    Patient is not progressing towards the following goals:      Problem: Knowledge Deficit - Standard  Goal: Patient and family/care givers will demonstrate understanding of plan of care, disease process/condition, diagnostic tests and medications  10/28/2023 0938 by Sumit Guerrero R.N.  Outcome: Progressing  10/28/2023 0935 by Sumit Guerrero R.N.  Outcome: Progressing     Problem: Skin Integrity  Goal: Skin integrity is maintained or improved  10/28/2023 0938 by Sumit Guerrero R.N.  Outcome: Progressing  10/28/2023 0935 by Sumit Guerrero R.N.  Outcome: Progressing     Problem: Fall Risk  Goal: Patient will remain free from falls  10/28/2023 0938 by Sumit Guerrero R.N.  Outcome: Progressing  10/28/2023 0935 by Sumit Guerrero R.N.  Outcome: Progressing     Problem: Fluid Volume  Goal: Fluid volume balance will be maintained  10/28/2023 0938 by Sumit Guerrero R.N.  Outcome: Progressing  10/28/2023 0935 by Sumit Guerrero R.N.  Outcome: Progressing     Problem: Nutrition  Goal: Patient's nutritional and fluid intake will be adequate or improve  10/28/2023 0938 by Sumit Guerrero R.N.  Outcome: Progressing  10/28/2023 0935 by Sumit Guerrero R.N.  Outcome: Progressing  Goal: Enteral nutrition will be maintained or improve  10/28/2023 0938 by Sumit Guerrreo R.N.  Outcome: Progressing  10/28/2023 0935 by Sumit Guerrero R.N.  Outcome: Progressing  Goal: Enteral nutrition will be maintained or improve  10/28/2023 0938 by Sumit Guerrero R.N.  Outcome: Progressing  10/28/2023 0935 by Sumit Guerrero R.N.  Outcome: Progressing     Problem: Urinary Elimination  Goal: Establish and maintain regular urinary output  10/28/2023 0938 by Sumit Guerrero R.N.  Outcome:  Progressing  10/28/2023 0935 by Sumit Guerrero R.N.  Outcome: Progressing     Problem: Bowel Elimination  Goal: Establish and maintain regular bowel function  10/28/2023 0938 by Sumit Guerrero R.N.  Outcome: Progressing  10/28/2023 0935 by Sumit Guerrero R.N.  Outcome: Progressing     Problem: Rectal Tube  Goal: Fecal output will be contained and skin will remain free from irritation  10/28/2023 0938 by Sumit Guerrero R.N.  Outcome: Progressing  10/28/2023 0935 by Sumit Guerrero R.N.  Outcome: Progressing     Problem: Mobility  Goal: Patient's capacity to carry out activities will improve  10/28/2023 0938 by Sumit Guerrero R.N.  Outcome: Progressing  10/28/2023 0935 by Sumit Guerrero R.N.  Outcome: Progressing     Problem: Self Care  Goal: Patient will have the ability to perform ADLs independently or with assistance (bathe, groom, dress, toilet and feed)  10/28/2023 0938 by Sumit Guerrero R.N.  Outcome: Progressing  10/28/2023 0935 by Sumit Guerrero R.N.  Outcome: Progressing

## 2023-10-28 NOTE — CARE PLAN
The patient is Stable - Low risk of patient condition declining or worsening    Shift Goals  Clinical Goals: VSS, anti xa, safety  Patient Goals: sleep, comfort  Family Goals: updates    Progress made toward(s) clinical / shift goals:  yes    Patient is not progressing towards the following goals:      Problem: Knowledge Deficit - Standard  Goal: Patient and family/care givers will demonstrate understanding of plan of care, disease process/condition, diagnostic tests and medications  Outcome: Progressing     Problem: Skin Integrity  Goal: Skin integrity is maintained or improved  Outcome: Progressing     Problem: Fall Risk  Goal: Patient will remain free from falls  Outcome: Progressing     Problem: Fluid Volume  Goal: Fluid volume balance will be maintained  Outcome: Progressing     Problem: Nutrition  Goal: Patient's nutritional and fluid intake will be adequate or improve  Outcome: Progressing  Goal: Enteral nutrition will be maintained or improve  Outcome: Progressing  Goal: Enteral nutrition will be maintained or improve  Outcome: Progressing     Problem: Urinary Elimination  Goal: Establish and maintain regular urinary output  Outcome: Progressing     Problem: Bowel Elimination  Goal: Establish and maintain regular bowel function  Outcome: Progressing     Problem: Rectal Tube  Goal: Fecal output will be contained and skin will remain free from irritation  Outcome: Progressing     Problem: Mobility  Goal: Patient's capacity to carry out activities will improve  Outcome: Progressing     Problem: Self Care  Goal: Patient will have the ability to perform ADLs independently or with assistance (bathe, groom, dress, toilet and feed)  Outcome: Progressing

## 2023-10-29 ENCOUNTER — APPOINTMENT (OUTPATIENT)
Dept: CARDIOLOGY | Facility: MEDICAL CENTER | Age: 83
DRG: 280 | End: 2023-10-29
Attending: INTERNAL MEDICINE
Payer: MEDICARE

## 2023-10-29 LAB
ABO + RH BLD: NORMAL
ABO GROUP BLD: NORMAL
ALBUMIN SERPL BCP-MCNC: 2.3 G/DL (ref 3.2–4.9)
ALBUMIN/GLOB SERPL: 1.2 G/DL
ALP SERPL-CCNC: 96 U/L (ref 30–99)
ALT SERPL-CCNC: 459 U/L (ref 2–50)
ANION GAP SERPL CALC-SCNC: 13 MMOL/L (ref 7–16)
ANISOCYTOSIS BLD QL SMEAR: ABNORMAL
AST SERPL-CCNC: 261 U/L (ref 12–45)
BARCODED ABORH UBTYP: 600
BARCODED PRD CODE UBPRD: NORMAL
BARCODED UNIT NUM UBUNT: NORMAL
BASOPHILS # BLD AUTO: 0.1 % (ref 0–1.8)
BASOPHILS # BLD: 0.01 K/UL (ref 0–0.12)
BILIRUB SERPL-MCNC: 0.3 MG/DL (ref 0.1–1.5)
BLD GP AB SCN SERPL QL: NORMAL
BUN SERPL-MCNC: 46 MG/DL (ref 8–22)
BURR CELLS BLD QL SMEAR: NORMAL
CALCIUM ALBUM COR SERPL-MCNC: 8.8 MG/DL (ref 8.5–10.5)
CALCIUM SERPL-MCNC: 7.4 MG/DL (ref 8.5–10.5)
CHLORIDE SERPL-SCNC: 109 MMOL/L (ref 96–112)
CK SERPL-CCNC: 519 U/L (ref 0–154)
CO2 SERPL-SCNC: 14 MMOL/L (ref 20–33)
COMMENT 1642: NORMAL
COMPONENT R 8504R: NORMAL
CREAT SERPL-MCNC: 1.46 MG/DL (ref 0.5–1.4)
EKG IMPRESSION: NORMAL
EOSINOPHIL # BLD AUTO: 0 K/UL (ref 0–0.51)
EOSINOPHIL NFR BLD: 0 % (ref 0–6.9)
ERYTHROCYTE [DISTWIDTH] IN BLOOD BY AUTOMATED COUNT: 52.7 FL (ref 35.9–50)
GFR SERPLBLD CREATININE-BSD FMLA CKD-EPI: 48 ML/MIN/1.73 M 2
GLOBULIN SER CALC-MCNC: 2 G/DL (ref 1.9–3.5)
GLUCOSE SERPL-MCNC: 122 MG/DL (ref 65–99)
HCT VFR BLD AUTO: 21.4 % (ref 42–52)
HCT VFR BLD AUTO: 27.8 % (ref 42–52)
HGB BLD-MCNC: 6.5 G/DL (ref 14–18)
HGB BLD-MCNC: 8.3 G/DL (ref 14–18)
HGB BLD-MCNC: 9.2 G/DL (ref 14–18)
HYPOCHROMIA BLD QL SMEAR: ABNORMAL
IMM GRANULOCYTES # BLD AUTO: 0.04 K/UL (ref 0–0.11)
IMM GRANULOCYTES NFR BLD AUTO: 0.3 % (ref 0–0.9)
LYMPHOCYTES # BLD AUTO: 0.37 K/UL (ref 1–4.8)
LYMPHOCYTES NFR BLD: 3.1 % (ref 22–41)
MACROCYTES BLD QL SMEAR: ABNORMAL
MAGNESIUM SERPL-MCNC: 2.1 MG/DL (ref 1.5–2.5)
MCH RBC QN AUTO: 27.7 PG (ref 27–33)
MCHC RBC AUTO-ENTMCNC: 30.4 G/DL (ref 32.3–36.5)
MCV RBC AUTO: 91.1 FL (ref 81.4–97.8)
MICROCYTES BLD QL SMEAR: ABNORMAL
MONOCYTES # BLD AUTO: 0.73 K/UL (ref 0–0.85)
MONOCYTES NFR BLD AUTO: 6.1 % (ref 0–13.4)
MORPHOLOGY BLD-IMP: NORMAL
NEUTROPHILS # BLD AUTO: 10.83 K/UL (ref 1.82–7.42)
NEUTROPHILS NFR BLD: 90.4 % (ref 44–72)
NRBC # BLD AUTO: 0.05 K/UL
NRBC BLD-RTO: 0.4 /100 WBC (ref 0–0.2)
PLATELET # BLD AUTO: 341 K/UL (ref 164–446)
PLATELET BLD QL SMEAR: NORMAL
PMV BLD AUTO: 10.6 FL (ref 9–12.9)
POIKILOCYTOSIS BLD QL SMEAR: NORMAL
POLYCHROMASIA BLD QL SMEAR: NORMAL
POTASSIUM SERPL-SCNC: 3.6 MMOL/L (ref 3.6–5.5)
PRODUCT TYPE UPROD: NORMAL
PROT SERPL-MCNC: 4.3 G/DL (ref 6–8.2)
RBC # BLD AUTO: 2.35 M/UL (ref 4.7–6.1)
RBC BLD AUTO: PRESENT
RH BLD: NORMAL
SCCMEC + MECA PNL NOSE NAA+PROBE: NEGATIVE
SODIUM SERPL-SCNC: 136 MMOL/L (ref 135–145)
TROPONIN T SERPL-MCNC: 1623 NG/L (ref 6–19)
UNIT STATUS USTAT: NORMAL
WBC # BLD AUTO: 12 K/UL (ref 4.8–10.8)

## 2023-10-29 PROCEDURE — 85014 HEMATOCRIT: CPT

## 2023-10-29 PROCEDURE — 36430 TRANSFUSION BLD/BLD COMPNT: CPT

## 2023-10-29 PROCEDURE — A9270 NON-COVERED ITEM OR SERVICE: HCPCS | Performed by: STUDENT IN AN ORGANIZED HEALTH CARE EDUCATION/TRAINING PROGRAM

## 2023-10-29 PROCEDURE — A9270 NON-COVERED ITEM OR SERVICE: HCPCS | Performed by: INTERNAL MEDICINE

## 2023-10-29 PROCEDURE — 93306 TTE W/DOPPLER COMPLETE: CPT | Mod: 26 | Performed by: INTERNAL MEDICINE

## 2023-10-29 PROCEDURE — 93306 TTE W/DOPPLER COMPLETE: CPT

## 2023-10-29 PROCEDURE — 85025 COMPLETE CBC W/AUTO DIFF WBC: CPT

## 2023-10-29 PROCEDURE — 84484 ASSAY OF TROPONIN QUANT: CPT

## 2023-10-29 PROCEDURE — 36415 COLL VENOUS BLD VENIPUNCTURE: CPT

## 2023-10-29 PROCEDURE — 30233N1 TRANSFUSION OF NONAUTOLOGOUS RED BLOOD CELLS INTO PERIPHERAL VEIN, PERCUTANEOUS APPROACH: ICD-10-PCS | Performed by: INTERNAL MEDICINE

## 2023-10-29 PROCEDURE — C9113 INJ PANTOPRAZOLE SODIUM, VIA: HCPCS | Performed by: INTERNAL MEDICINE

## 2023-10-29 PROCEDURE — 86900 BLOOD TYPING SEROLOGIC ABO: CPT

## 2023-10-29 PROCEDURE — 80053 COMPREHEN METABOLIC PANEL: CPT

## 2023-10-29 PROCEDURE — 700102 HCHG RX REV CODE 250 W/ 637 OVERRIDE(OP): Performed by: STUDENT IN AN ORGANIZED HEALTH CARE EDUCATION/TRAINING PROGRAM

## 2023-10-29 PROCEDURE — P9016 RBC LEUKOCYTES REDUCED: HCPCS

## 2023-10-29 PROCEDURE — 82550 ASSAY OF CK (CPK): CPT

## 2023-10-29 PROCEDURE — 700111 HCHG RX REV CODE 636 W/ 250 OVERRIDE (IP): Performed by: NURSE PRACTITIONER

## 2023-10-29 PROCEDURE — 99233 SBSQ HOSP IP/OBS HIGH 50: CPT | Performed by: INTERNAL MEDICINE

## 2023-10-29 PROCEDURE — 86850 RBC ANTIBODY SCREEN: CPT

## 2023-10-29 PROCEDURE — A9270 NON-COVERED ITEM OR SERVICE: HCPCS

## 2023-10-29 PROCEDURE — 85018 HEMOGLOBIN: CPT

## 2023-10-29 PROCEDURE — 700102 HCHG RX REV CODE 250 W/ 637 OVERRIDE(OP): Performed by: INTERNAL MEDICINE

## 2023-10-29 PROCEDURE — 93010 ELECTROCARDIOGRAM REPORT: CPT | Performed by: INTERNAL MEDICINE

## 2023-10-29 PROCEDURE — 700111 HCHG RX REV CODE 636 W/ 250 OVERRIDE (IP): Mod: JZ | Performed by: INTERNAL MEDICINE

## 2023-10-29 PROCEDURE — 700111 HCHG RX REV CODE 636 W/ 250 OVERRIDE (IP): Performed by: INTERNAL MEDICINE

## 2023-10-29 PROCEDURE — 87641 MR-STAPH DNA AMP PROBE: CPT

## 2023-10-29 PROCEDURE — 700101 HCHG RX REV CODE 250: Performed by: NURSE PRACTITIONER

## 2023-10-29 PROCEDURE — 83735 ASSAY OF MAGNESIUM: CPT

## 2023-10-29 PROCEDURE — 770020 HCHG ROOM/CARE - TELE (206)

## 2023-10-29 PROCEDURE — 700102 HCHG RX REV CODE 250 W/ 637 OVERRIDE(OP)

## 2023-10-29 PROCEDURE — 99233 SBSQ HOSP IP/OBS HIGH 50: CPT | Mod: FS | Performed by: INTERNAL MEDICINE

## 2023-10-29 PROCEDURE — 86923 COMPATIBILITY TEST ELECTRIC: CPT

## 2023-10-29 PROCEDURE — 86901 BLOOD TYPING SEROLOGIC RH(D): CPT

## 2023-10-29 PROCEDURE — 700111 HCHG RX REV CODE 636 W/ 250 OVERRIDE (IP): Mod: JZ

## 2023-10-29 PROCEDURE — 700105 HCHG RX REV CODE 258: Performed by: NURSE PRACTITIONER

## 2023-10-29 RX ORDER — MIDODRINE HYDROCHLORIDE 5 MG/1
5 TABLET ORAL
Status: DISCONTINUED | OUTPATIENT
Start: 2023-10-29 | End: 2023-10-29

## 2023-10-29 RX ORDER — MIDODRINE HYDROCHLORIDE 5 MG/1
5 TABLET ORAL ONCE
Status: COMPLETED | OUTPATIENT
Start: 2023-10-29 | End: 2023-10-29

## 2023-10-29 RX ADMIN — PANTOPRAZOLE SODIUM 40 MG: 40 INJECTION, POWDER, FOR SOLUTION INTRAVENOUS at 18:01

## 2023-10-29 RX ADMIN — Medication 10 MG: at 20:46

## 2023-10-29 RX ADMIN — CEFTRIAXONE SODIUM 2000 MG: 10 INJECTION, POWDER, FOR SOLUTION INTRAVENOUS at 06:11

## 2023-10-29 RX ADMIN — HYDROCORTISONE SODIUM SUCCINATE 50 MG: 100 INJECTION, POWDER, FOR SOLUTION INTRAMUSCULAR; INTRAVENOUS at 06:11

## 2023-10-29 RX ADMIN — ROTIGOTINE 4 MG: 4 PATCH, EXTENDED RELEASE TRANSDERMAL at 18:02

## 2023-10-29 RX ADMIN — MIDODRINE HYDROCHLORIDE 5 MG: 5 TABLET ORAL at 06:13

## 2023-10-29 RX ADMIN — PANTOPRAZOLE SODIUM 40 MG: 40 INJECTION, POWDER, FOR SOLUTION INTRAVENOUS at 06:11

## 2023-10-29 RX ADMIN — DOXYCYCLINE 100 MG: 100 INJECTION, POWDER, LYOPHILIZED, FOR SOLUTION INTRAVENOUS at 06:23

## 2023-10-29 RX ADMIN — THYROID, PORCINE 120 MG: 120 TABLET ORAL at 18:02

## 2023-10-29 RX ADMIN — HYDROCORTISONE SODIUM SUCCINATE 50 MG: 100 INJECTION, POWDER, FOR SOLUTION INTRAMUSCULAR; INTRAVENOUS at 13:35

## 2023-10-29 RX ADMIN — DOXYCYCLINE 100 MG: 100 INJECTION, POWDER, LYOPHILIZED, FOR SOLUTION INTRAVENOUS at 18:15

## 2023-10-29 RX ADMIN — FUROSEMIDE 40 MG: 10 INJECTION, SOLUTION INTRAVENOUS at 06:12

## 2023-10-29 RX ADMIN — ASPIRIN 81 MG: 81 TABLET, COATED ORAL at 06:12

## 2023-10-29 RX ADMIN — DOCUSATE SODIUM 50 MG AND SENNOSIDES 8.6 MG 2 TABLET: 8.6; 5 TABLET, FILM COATED ORAL at 06:00

## 2023-10-29 RX ADMIN — HYDROCORTISONE SODIUM SUCCINATE 50 MG: 100 INJECTION, POWDER, FOR SOLUTION INTRAMUSCULAR; INTRAVENOUS at 20:46

## 2023-10-29 RX ADMIN — FINASTERIDE 5 MG: 5 TABLET, FILM COATED ORAL at 06:12

## 2023-10-29 RX ADMIN — DOCUSATE SODIUM 50 MG AND SENNOSIDES 8.6 MG 2 TABLET: 8.6; 5 TABLET, FILM COATED ORAL at 18:01

## 2023-10-29 ASSESSMENT — ENCOUNTER SYMPTOMS
VOMITING: 0
NAUSEA: 0
DIARRHEA: 0
SHORTNESS OF BREATH: 0
FALLS: 1
SINUS PAIN: 0
ABDOMINAL PAIN: 0
CHILLS: 0
FEVER: 0
MYALGIAS: 1
COUGH: 0

## 2023-10-29 ASSESSMENT — FIBROSIS 4 INDEX: FIB4 SCORE: 3.81

## 2023-10-29 NOTE — PROGRESS NOTES
Cardiology Progress Note    Name:   Elias Casanova   YOB: 1940  Age:   82 y.o.  male   MRN:   [id number]    Attending Cardiologist: Dr. Kelly      Chief Complaint: Fall  Cardiology Consult:  NSTEMI    History of Present Illness  83yo male with Parkinson's Disease, primary adrenal insufficiency on prednisone, chronic AF on Pradaxa presented after fall (found down) during work up discovered NSTEMI. This admission he has been treated medically for his NSTEMI due to his severe co morbidities.     Interim Events   Hypotensive overnight, increased steroid dosing and started midodrine, BB held. Hb drop 2 points. Transfusing 1 unit pRBC.   Stopped heparin drip and hold antiplatelets for now  Echo performed this morning showing wall motion abnormalities in anterior/LAD territory.     Patient is drowsy this morning, answers questions but falls asleep afterwards. No cardiac complaints. Pain in feet.     Review of Systems   Unable to perform ROS: Medical condition   Respiratory:  Negative for shortness of breath.    Cardiovascular:  Positive for leg swelling. Negative for chest pain.       Medical History  Past Medical History:   Diagnosis Date    Elevated liver function tests 2018    Hyponatremia 2018    Parkinson's disease     Thrombocytosis 2020         Family History   Problem Relation Age of Onset    Other Mother 88        hip fx         Social History     Tobacco Use    Smoking status: Former     Current packs/day: 0.00     Types: Cigarettes     Quit date: 3/25/1990     Years since quittin.6    Smokeless tobacco: Never   Vaping Use    Vaping Use: Never used   Substance Use Topics    Alcohol use: No     Comment: 3/25/21-quit at age 42    Drug use: Never         Allergies   Allergen Reactions    Novocain Unspecified     Eye swelling and redness         Medications   Scheduled Medications   Medication Dose Frequency    cefTRIAXone (ROCEPHIN) IV  2,000 mg Q24HRS    doxycycline  100  "mg Q12HRS    [Held by provider] metoprolol SR  12.5 mg DAILY    furosemide  40 mg Q DAY    rotigotine  4 mg DAILY    tamsulosin  0.4 mg AFTER BREAKFAST    finasteride  5 mg DAILY    hydrocortisone sodium succinate PF  50 mg Q8HRS    [Held by provider] aspirin  81 mg DAILY    pantoprazole  40 mg BID    melatonin  10 mg Nightly    thyroid  60 mg Once per day on Mon Tue Wed Thu Fri Sat    And    thyroid  120 mg Once per day on Sun    senna-docusate  2 Tablet BID           Physical Exam  BP (!) 84/56   Pulse 86   Temp 36.5 °C (97.7 °F) (Temporal)   Resp 16   Wt 73.1 kg (161 lb 2.5 oz)   SpO2 97%     Physical Exam  Vitals reviewed.   Constitutional:       Comments: Drowsy but responds to verbal ques   HENT:      Head: Normocephalic and atraumatic.   Cardiovascular:      Rate and Rhythm: Normal rate and regular rhythm.   Pulmonary:      Effort: Pulmonary effort is normal.   Musculoskeletal:      Right lower leg: Edema present.      Left lower leg: Edema present.   Skin:     General: Skin is warm and dry.   Psychiatric:         Judgment: Judgment normal.           Labs (personally reviewed):     Lab Results   Component Value Date/Time    SODIUM 136 10/29/2023 03:44 AM    POTASSIUM 3.6 10/29/2023 03:44 AM    CHLORIDE 109 10/29/2023 03:44 AM    CO2 14 (L) 10/29/2023 03:44 AM    GLUCOSE 122 (H) 10/29/2023 03:44 AM    BUN 46 (H) 10/29/2023 03:44 AM    CREATININE 1.46 (H) 10/29/2023 03:44 AM     Lab Results   Component Value Date/Time    CHOLSTRLTOT 95 (L) 10/27/2023 12:04 AM    LDL 59 10/27/2023 12:04 AM    HDL 32 (A) 10/27/2023 12:04 AM    TRIGLYCERIDE 20 10/27/2023 12:04 AM     No results found for: \"BNPBTYPENAT\"      Cardiac Imaging and Procedures Review      Telemetry Review  AF, rate controlled      Assessment and Medical Decision Making:  NSTEMI  Ischemic Cardiomyopathy  - patient and care team elected to treated medically  - aspirin and heparin drip stopped this morning for hemoglobin drop to 6.5. Once H/H or " bleeding treated has stabilized can restart antiplatelet agent, either aspirin 81mg or clopidogrel 75mg  - unable to tolerate beta blocker due to hypotension  - statin has been held though AST elevated likely due to myocardial infarction and can start on a moderate intensity dose like atrova 20mg  - echo confirms anterior infarct, no thrombus in LV noted on echo. Unable to use ACEi/ARB/ARNI or spironolactone due to hypotension    Chronic AF  - recommend disconotinuing NOAC at time of discharge given his drop in hemoglobin this admission, the risk of bleeding outweighs the benefit.     Recommend Palliative care consult for this patient given his co morbidities of adrenal insufficiency, Parkinson's and now LAD infarct and inability to tolerate medical therapy for this due to his anemia.   Will follow       Please see Dr. Kelly's attestation for additions and further recommendations.    Dottie Fragoso PA-C  [facility]

## 2023-10-29 NOTE — PROGRESS NOTES
Bedside report received and patient care assumed. Pt is resting in bed, A&O4, with no complaints of pain, and is on 1L NC. Tele box on. All fall precautions are in place, belongings at bedside table.  Pt was updated on POC, no questions or concerns. Pt educated on use of call light for assistance.

## 2023-10-29 NOTE — PROGRESS NOTES
Report received and assumed patient care. Pt A&O x 4 and on 3L NC, saturating 95 %. No signs of distress noted at this time. Pt has tele box in place. Pt updated on plan of care for the day and has call light within reach. Fall precautions are in place.

## 2023-10-29 NOTE — PROGRESS NOTES
HOSPITALIST NOC CROSS COVER    Rapid response called for acute respiratory failure.     82-year-old patient with Parkinson's, hypertension, atrial fibrillation and pulmonary hypertension admitted to the hospital for rhabdomyolysis secondary to being on the ground for about 12 hours after falling and being too weak to get up. Also noted to have NSTEMI for which cardiology was called and patient was placed on a heparin drip. He has had up-trending troponins; however, is not a candidate for cardiac catheterization.  It is also the patient's wishes not to proceed with any invasive procedures. Medical management is being pursued. He was started on low dose metoprolol for rate control for his atrial fibrillation; however, even at low-dose, there has been concern with his soft blood pressures.  The patient had some fluid overload and cardiology recommended Lasix, which the patient was started on.  The patient was also started on steroids due to concern for adrenal insufficiency as source of his hypotension.     At beginning of the shift, the patient was on 1 L supplemental oxygen.  While he was defecating, he suddenly became tachypneic and was requiring 15 L supplemental oxygen to maintain his saturation.  Rapid response was called. Stat EKG shows A-fib, rate 108, prolonged QT. Troponin has increased from 9124-7573. BNP 54,121. Procal 0.56. CXR shows hypoinflation with elevated left hemidiaphragm and left lung base atelectasis or pneumonia, as well as probable mild edema.   During this rapid, the patient also had hypotension and was given small bolus IV fluids.  His map has remained greater than 65.  Due to patient's tenuous condition, evaluation for higher level care was requested.     A/P  #Acute hypoxic respiratory failure  #Left lower lobe pneumonia  #YASEMIN   #Hypotension  - Hold antihypotensives, including metoprolol, Lasix  - Stress dose steroids for adrenal insufficiency  - Start abx for PNA   - ICU consult        [place]umya Counseling: I discussed with the patient the risks of tildrakizumab including but not limited to immunosuppression, malignancy, posterior leukoencephalopathy syndrome, and serious infections.  The patient understands that monitoring is required including a PPD at baseline and must alert us or the primary physician if symptoms of infection or other concerning signs are noted.

## 2023-10-29 NOTE — CARE PLAN
The patient is Watcher - Medium risk of patient condition declining or worsening    Shift Goals  Clinical Goals: VSS, safety, monitor labs  Patient Goals: sleep, comfort  Family Goals: updates    Progress made toward(s) clinical / shift goals:    Problem: Knowledge Deficit - Standard  Goal: Patient and family/care givers will demonstrate understanding of plan of care, disease process/condition, diagnostic tests and medications  Outcome: Progressing    Problem: Skin Integrity  Goal: Skin integrity is maintained or improved  Outcome: Progressing     Problem: Fall Risk  Goal: Patient will remain free from falls  Outcome: Progressing     Problem: Fluid Volume  Goal: Fluid volume balance will be maintained  Outcome: Progressing     Problem: Nutrition  Goal: Patient's nutritional and fluid intake will be adequate or improve  Outcome: Progressing     Problem: Urinary Elimination  Goal: Establish and maintain regular urinary output  Outcome: Progressing     Problem: Bowel Elimination  Goal: Establish and maintain regular bowel function  Outcome: Progressing     Problem: Mobility  Goal: Patient's capacity to carry out activities will improve  Outcome: Progressing     Problem: Communication  Goal: The ability to communicate needs accurately and effectively will improve  Outcome: Progressing       Patient is not progressing towards the following goals:    Problem: Hemodynamics  Goal: Patient's hemodynamics, fluid balance and neurologic status will be stable or improve  Outcome: Not Progressing     Problem: Respiratory  Goal: Patient will achieve/maintain optimum respiratory ventilation and gas exchange  Outcome: Not Progressing     Due to patients exertion while trying to have a bowel movement, he became hypoxic and hypotensive. Rapid team was called and his increased work of breathing and hypotension were addressed.

## 2023-10-29 NOTE — PROGRESS NOTES
NOC HOSPITALIST CROSS COVER    Notified by RN regarding patient having persistent hypotension. The patient was evaluated by intensivist following a rapid for increased O2 demand and hypotension. Please see prior cross cover note for details. Dr De Dios increased stress dose steroids. Also discussed holding beta blockers due to persistent hypotension and a dose of midodrine. Dr De Dios stated that there is no indication for higher level of care unless his hypotension becomes worse or the patient becomes symptomatic. Slightly improved hypotension following the above. Will give another dose of midodrine this AM and held metoprolol.       Vitals:    10/29/23 0400   BP: (!) 87/60   Pulse: 79   Resp: 20   Temp: 36.4 °C (97.5 °F)   SpO2: 97%      Update:  Notified regarding a hgb decrease from 8.5 to 6.5 overnight. STAT redraw ordered. Will place an order for a COD in the event that the patient's hgb is accurate and the patient needs blood.     -----------------------------------------------------------------------------------------------------------    Electronically signed by:  Barbara Kline, MYLES, GUNNER, SALAZAR-BC  Hospitalist Services

## 2023-10-29 NOTE — PROGRESS NOTES
Hospital Medicine Daily Progress Note    Date of Service  10/29/2023    Chief Complaint  Elias Casanova is a 82 y.o. male admitted 10/26/2023 with NSTEMI, rhabdomyolysis     Hospital Course  This is a  82 y.o. male who was transferred from outside facility 10/26/2023 with rhabdomyolysis and elevated troponin.  PMH of A-fib on anticoagulation, Parkinson disease, hypertension, pulmonary hypertension  He was found on the ground, says he had fallen out of bed, he does not believe he lost consciousness.  He was too weak to get back up and thinks he was on the ground for about 12 hours or more.  He is found to have rhabdomyolysis at outside facility with CPK of 1100. He was also found to have elevated troponin and was sent here for high level of care     Interval Problem Update  Patient seen and examined, weak tirred, denie chest pain, troponin trending up, and his BP is also low. I held his metoprolol today as his SBP went as low as 70.   His CPK also trending up so he has been started on IV fluid, but will need to be carefull as patient has already lower extremity edema.  Cardiology was consulted for concern for NSTEMI and he has been started on heparin drip, an echo has been ordered   His sister is at bed side and we discussed with his sister and patient regarding goals of care code status has been changed to DNR/DNI   Initially declined echo but sister said will talk and try to convince him to have the echo   Prognosis is really guarded at this time   Restarted back his parkinson medication   10/28: Patient seen and examined, daughter at bedside updated on gals of care.  Cardiology following case discussed with Dr. Kelly , echo pending, troponin up to around 1600. Cont on heparin drip.  Hypotension, he has h/o of adrenal insufficiency with give IV hydrocortisone for stress dose steroids  CPK around 1100 today close monitoring for renal failure   Prognosis guarded   10/29: Patient seen and examined, today, his  hemoglobin has dropped down to 6.5 this AM. Will give a unit of RBC, stoping heparin drip and also holding aspirin for now.  Not on IV fluid but on lasix now as patient is fluid overload.  A rapid was called overnight, CXr concern for pneumonia started on IV abx.  Cardiology following , echo pending case discussed appreciate rec.   Monitor his Renal failure and also CPK   Prognosis Guarded   I have discussed this patient's plan of care and discharge plan at IDT rounds today with Case Management, Nursing, Nursing leadership, and other members of the IDT team.    Consultants/Specialty  cardiology    Code Status  DNAR/DNI    Disposition  The patient is not medically cleared for discharge to home or a post-acute facility.      I have placed the appropriate orders for post-discharge needs.    Review of Systems  Review of Systems   Constitutional:  Negative for chills and fever.   HENT:  Negative for congestion, nosebleeds and sinus pain.    Respiratory:  Negative for cough and shortness of breath.    Cardiovascular:  Negative for chest pain.   Gastrointestinal:  Negative for abdominal pain, diarrhea, nausea and vomiting.   Genitourinary:  Negative for dysuria and urgency.   Musculoskeletal:  Positive for falls and myalgias.   All other systems reviewed and are negative.       Physical Exam  Temp:  [36.4 °C (97.5 °F)-36.7 °C (98.1 °F)] 36.7 °C (98.1 °F)  Pulse:  [] 98  Resp:  [12-24] 18  BP: ()/() 100/67  SpO2:  [92 %-100 %] 98 %    Physical Exam  Constitutional:       Appearance: Normal appearance.   HENT:      Head: Normocephalic and atraumatic.      Mouth/Throat:      Mouth: Mucous membranes are moist.      Pharynx: Oropharynx is clear. No oropharyngeal exudate or posterior oropharyngeal erythema.   Eyes:      General: No scleral icterus.  Cardiovascular:      Rate and Rhythm: Normal rate and regular rhythm.      Pulses: Normal pulses.      Heart sounds: Normal heart sounds. No murmur  heard.  Pulmonary:      Effort: Pulmonary effort is normal. No respiratory distress.      Breath sounds: Normal breath sounds. No wheezing.   Abdominal:      Palpations: Abdomen is soft.      Tenderness: There is no abdominal tenderness.   Musculoskeletal:         General: No swelling or tenderness. Normal range of motion.      Cervical back: Normal range of motion.   Skin:     General: Skin is warm and dry.   Neurological:      General: No focal deficit present.      Mental Status: He is alert and oriented to person, place, and time. Mental status is at baseline.   Psychiatric:         Mood and Affect: Mood normal.         Fluids    Intake/Output Summary (Last 24 hours) at 10/29/2023 1421  Last data filed at 10/29/2023 1310  Gross per 24 hour   Intake 4149.63 ml   Output 700 ml   Net 3449.63 ml         Laboratory  Recent Labs     10/27/23  0004 10/27/23  0914 10/28/23  0718 10/28/23  1308 10/29/23  0639   WBC 8.5  --  6.7  --  12.0*   RBC 2.91*  --  2.59*  --  2.35*   HEMOGLOBIN 8.0*   < > 7.1* 8.5* 6.5*   HEMATOCRIT 27.2*   < > 24.1* 28.3* 21.4*   MCV 93.5  --  93.1  --  91.1   MCH 27.5  --  27.4  --  27.7   MCHC 29.4*  --  29.5*  --  30.4*   RDW 54.2*  --  54.1*  --  52.7*   PLATELETCT 432  --  360  --  341   MPV 10.4  --  10.2  --  10.6    < > = values in this interval not displayed.       Recent Labs     10/28/23  0933 10/28/23  2139 10/29/23  0344   SODIUM 141 137 136   POTASSIUM 3.7 3.9 3.6   CHLORIDE 112 108 109   CO2 16* 14* 14*   GLUCOSE 91 141* 122*   BUN 44* 47* 46*   CREATININE 1.40 1.69* 1.46*   CALCIUM 7.9* 7.9* 7.4*       Recent Labs     10/27/23  0316   APTT 74.5*   INR 2.02*           Recent Labs     10/27/23  0004   TRIGLYCERIDE 20   HDL 32*   LDL 59         Imaging  EC-ECHOCARDIOGRAM COMPLETE W/O CONT         DX-CHEST-PORTABLE (1 VIEW)   Final Result      1.  Hypoinflation with elevated LEFT hemidiaphragm and LEFT lung base atelectasis or pneumonia.   2.  Probable mild edema.   3.  No  pneumothorax.           Assessment/Plan  * NSTEMI (non-ST elevated myocardial infarction) (HCC)- (present on admission)  Assessment & Plan  Troponin significantly elevated at outside facility and trending up, in the 600s here  EKG from transferring facility not concerning for acute ischemia, repeat EKG here ordered  Patient fell out of bed and was unable to get up, he does have a history of Parkinson's disease.  Unclear if he had a coronary event that resulted in him falling  I have consulted cardiology and discussed case with Dr. Kelly   Will continue on heparin drip for now, echo has been ordered since troponin trending up   No cardiac cath at this time as patient not a good candidate for it.  He declined echo initially now family at bedside and we had discussion, with patient sister who will discuss with him again to see if she can convince him to have the echo   close monitoring on tele     Advance care planning  Assessment & Plan  His sister is now at bedside and we had a long discussion with patient and his sister regarding goals of care.  Since patient has declined echo today we discussed regarding code status and also comfort care and hospice  Will change patient to DNR/DNI for now , no hospice or comfort care  Sister will talk and try to convince him to see if he is willing to have the echo   More then 18 min spent on discussion for goals of care alone       Anemia- (present on admission)  Assessment & Plan  Baseline hemoglobin 12-14.  Was 8.9 on presentation.  No signs of GI bleed, unclear etiology  He is on dabigatran due to history of A-fib  And is now on heparin drip  Has been started on PPI will check a occult blood stool     Non-traumatic rhabdomyolysis- (present on admission)  Assessment & Plan  CPK continues to trend up starting IV fluid but need to be careful  as patient is edematous   This was also discussed with family     Persistent atrial fibrillation (HCC)- (present on admission)  Assessment &  Plan  Follows with cardiology, holding metoprolol as his BP is low   Holding pradaxa as he is on heparin drip     Parkinson disease- (present on admission)  Assessment & Plan  Cont outpatient regimen   PT, OT ordered       VTE prophylaxis: scd    The very real possibilty of a deterioration of this patient's condition required the highest level of my preparedness for sudden, emergent intervention.  I provided  services, which included medication orders, frequent reevaluations of the patient's condition and response to treatment, ordering and reviewing test results, and discussing the case with various consultants.  The time associated with the care of the patient was 51 minutes. Review chart for interventions. This time is exclusive of any other billable procedures.        I have performed a physical exam and reviewed and updated ROS and Plan today (10/29/2023). In review of yesterday's note (10/28/2023), there are no changes except as documented above.

## 2023-10-29 NOTE — PROGRESS NOTES
Episodes of asymptomatic hypotension and hypertension during shift. Consistent afib in 100s to 110s. Metoprolol resumed today. No complaints of pain. A+Ox4. Delayed response d/t advanced Parkinson's disease. Unable to wean off o2. George inserted today d/t retention. Bladder scan yielded >450ml of urine in bladder w/o patient being able to void. Patient has been straight catheterized three times since admission. Tamulosin / finasteride added. Worked w/ PT, able to ambulate to stand and pivot to bedside chair. Heparin gtt therapeutic x2 infusing @ 16 units/kg/hr. Troponin continues to trend upward. CPK slightly trending downward. Mottled purple discoloration developed in BLE and bilateral hands this evening along w/ an increase in swelling. MD aware and assessed patient at bedside. Neupro brought by family and verified by unit pharmacist. Patient is still awaiting bedside echo but cardiology does not feel he is a candidate for a cardiac catheterization. MD aware of all areas of concern in this note.

## 2023-10-30 ENCOUNTER — HOSPITAL ENCOUNTER (INPATIENT)
Facility: REHABILITATION | Age: 83
End: 2023-10-30
Attending: PHYSICAL MEDICINE & REHABILITATION | Admitting: PHYSICAL MEDICINE & REHABILITATION
Payer: MEDICARE

## 2023-10-30 LAB
ALBUMIN SERPL BCP-MCNC: 3.1 G/DL (ref 3.2–4.9)
ALBUMIN/GLOB SERPL: 1.2 G/DL
ALP SERPL-CCNC: 125 U/L (ref 30–99)
ALT SERPL-CCNC: 565 U/L (ref 2–50)
ANION GAP SERPL CALC-SCNC: 13 MMOL/L (ref 7–16)
AST SERPL-CCNC: 223 U/L (ref 12–45)
BILIRUB SERPL-MCNC: 0.3 MG/DL (ref 0.1–1.5)
BUN SERPL-MCNC: 47 MG/DL (ref 8–22)
CALCIUM ALBUM COR SERPL-MCNC: 9.2 MG/DL (ref 8.5–10.5)
CALCIUM SERPL-MCNC: 8.5 MG/DL (ref 8.5–10.5)
CHLORIDE SERPL-SCNC: 105 MMOL/L (ref 96–112)
CO2 SERPL-SCNC: 19 MMOL/L (ref 20–33)
CREAT SERPL-MCNC: 1.64 MG/DL (ref 0.5–1.4)
ERYTHROCYTE [DISTWIDTH] IN BLOOD BY AUTOMATED COUNT: 53.7 FL (ref 35.9–50)
GFR SERPLBLD CREATININE-BSD FMLA CKD-EPI: 42 ML/MIN/1.73 M 2
GLOBULIN SER CALC-MCNC: 2.5 G/DL (ref 1.9–3.5)
GLUCOSE SERPL-MCNC: 126 MG/DL (ref 65–99)
HCT VFR BLD AUTO: 29.1 % (ref 42–52)
HCT VFR BLD AUTO: 30.8 % (ref 42–52)
HCT VFR BLD AUTO: 34 % (ref 42–52)
HGB BLD-MCNC: 10.4 G/DL (ref 14–18)
HGB BLD-MCNC: 9.1 G/DL (ref 14–18)
HGB BLD-MCNC: 9.4 G/DL (ref 14–18)
MCH RBC QN AUTO: 27.8 PG (ref 27–33)
MCHC RBC AUTO-ENTMCNC: 30.5 G/DL (ref 32.3–36.5)
MCV RBC AUTO: 91.1 FL (ref 81.4–97.8)
PLATELET # BLD AUTO: 406 K/UL (ref 164–446)
PMV BLD AUTO: 10.2 FL (ref 9–12.9)
POTASSIUM SERPL-SCNC: 3.2 MMOL/L (ref 3.6–5.5)
PROT SERPL-MCNC: 5.6 G/DL (ref 6–8.2)
RBC # BLD AUTO: 3.38 M/UL (ref 4.7–6.1)
SODIUM SERPL-SCNC: 137 MMOL/L (ref 135–145)
WBC # BLD AUTO: 13.5 K/UL (ref 4.8–10.8)

## 2023-10-30 PROCEDURE — 700102 HCHG RX REV CODE 250 W/ 637 OVERRIDE(OP): Performed by: INTERNAL MEDICINE

## 2023-10-30 PROCEDURE — 85018 HEMOGLOBIN: CPT

## 2023-10-30 PROCEDURE — 92610 EVALUATE SWALLOWING FUNCTION: CPT

## 2023-10-30 PROCEDURE — 770020 HCHG ROOM/CARE - TELE (206)

## 2023-10-30 PROCEDURE — 85014 HEMATOCRIT: CPT

## 2023-10-30 PROCEDURE — 99232 SBSQ HOSP IP/OBS MODERATE 35: CPT | Mod: FS | Performed by: INTERNAL MEDICINE

## 2023-10-30 PROCEDURE — 700102 HCHG RX REV CODE 250 W/ 637 OVERRIDE(OP): Performed by: STUDENT IN AN ORGANIZED HEALTH CARE EDUCATION/TRAINING PROGRAM

## 2023-10-30 PROCEDURE — 700111 HCHG RX REV CODE 636 W/ 250 OVERRIDE (IP): Mod: JZ | Performed by: INTERNAL MEDICINE

## 2023-10-30 PROCEDURE — 700105 HCHG RX REV CODE 258: Performed by: NURSE PRACTITIONER

## 2023-10-30 PROCEDURE — 700111 HCHG RX REV CODE 636 W/ 250 OVERRIDE (IP): Mod: JZ

## 2023-10-30 PROCEDURE — 700101 HCHG RX REV CODE 250: Performed by: NURSE PRACTITIONER

## 2023-10-30 PROCEDURE — 36415 COLL VENOUS BLD VENIPUNCTURE: CPT

## 2023-10-30 PROCEDURE — A9270 NON-COVERED ITEM OR SERVICE: HCPCS | Performed by: STUDENT IN AN ORGANIZED HEALTH CARE EDUCATION/TRAINING PROGRAM

## 2023-10-30 PROCEDURE — 97530 THERAPEUTIC ACTIVITIES: CPT

## 2023-10-30 PROCEDURE — 99233 SBSQ HOSP IP/OBS HIGH 50: CPT | Performed by: INTERNAL MEDICINE

## 2023-10-30 PROCEDURE — 700111 HCHG RX REV CODE 636 W/ 250 OVERRIDE (IP): Performed by: NURSE PRACTITIONER

## 2023-10-30 PROCEDURE — 85027 COMPLETE CBC AUTOMATED: CPT

## 2023-10-30 PROCEDURE — C9113 INJ PANTOPRAZOLE SODIUM, VIA: HCPCS | Performed by: INTERNAL MEDICINE

## 2023-10-30 PROCEDURE — 97116 GAIT TRAINING THERAPY: CPT

## 2023-10-30 PROCEDURE — A9270 NON-COVERED ITEM OR SERVICE: HCPCS | Performed by: INTERNAL MEDICINE

## 2023-10-30 PROCEDURE — 99223 1ST HOSP IP/OBS HIGH 75: CPT | Performed by: PHYSICAL MEDICINE & REHABILITATION

## 2023-10-30 PROCEDURE — 80053 COMPREHEN METABOLIC PANEL: CPT

## 2023-10-30 RX ORDER — POTASSIUM CHLORIDE 20 MEQ/1
40 TABLET, EXTENDED RELEASE ORAL ONCE
Status: COMPLETED | OUTPATIENT
Start: 2023-10-30 | End: 2023-10-30

## 2023-10-30 RX ORDER — ALBUTEROL SULFATE 90 UG/1
2 AEROSOL, METERED RESPIRATORY (INHALATION)
Status: DISCONTINUED | OUTPATIENT
Start: 2023-10-30 | End: 2023-11-01

## 2023-10-30 RX ORDER — CLOPIDOGREL BISULFATE 75 MG/1
75 TABLET ORAL DAILY
Status: DISCONTINUED | OUTPATIENT
Start: 2023-10-30 | End: 2023-11-01

## 2023-10-30 RX ADMIN — DOCUSATE SODIUM 50 MG AND SENNOSIDES 8.6 MG 2 TABLET: 8.6; 5 TABLET, FILM COATED ORAL at 17:59

## 2023-10-30 RX ADMIN — HYDROCORTISONE SODIUM SUCCINATE 50 MG: 100 INJECTION, POWDER, FOR SOLUTION INTRAMUSCULAR; INTRAVENOUS at 04:54

## 2023-10-30 RX ADMIN — PANTOPRAZOLE SODIUM 40 MG: 40 INJECTION, POWDER, FOR SOLUTION INTRAVENOUS at 04:55

## 2023-10-30 RX ADMIN — DOXYCYCLINE 100 MG: 100 INJECTION, POWDER, LYOPHILIZED, FOR SOLUTION INTRAVENOUS at 04:59

## 2023-10-30 RX ADMIN — TAMSULOSIN HYDROCHLORIDE 0.4 MG: 0.4 CAPSULE ORAL at 08:28

## 2023-10-30 RX ADMIN — PANTOPRAZOLE SODIUM 40 MG: 40 INJECTION, POWDER, FOR SOLUTION INTRAVENOUS at 17:59

## 2023-10-30 RX ADMIN — LEVOTHYROXINE, LIOTHYRONINE 60 MG: 19; 4.5 TABLET ORAL at 04:52

## 2023-10-30 RX ADMIN — ROTIGOTINE 4 MG: 4 PATCH, EXTENDED RELEASE TRANSDERMAL at 18:06

## 2023-10-30 RX ADMIN — HYDROCORTISONE SODIUM SUCCINATE 50 MG: 100 INJECTION, POWDER, FOR SOLUTION INTRAMUSCULAR; INTRAVENOUS at 18:00

## 2023-10-30 RX ADMIN — CEFTRIAXONE SODIUM 2000 MG: 10 INJECTION, POWDER, FOR SOLUTION INTRAVENOUS at 04:53

## 2023-10-30 RX ADMIN — Medication 10 MG: at 21:40

## 2023-10-30 RX ADMIN — POTASSIUM CHLORIDE 40 MEQ: 1500 TABLET, EXTENDED RELEASE ORAL at 18:00

## 2023-10-30 RX ADMIN — DOCUSATE SODIUM 50 MG AND SENNOSIDES 8.6 MG 2 TABLET: 8.6; 5 TABLET, FILM COATED ORAL at 04:51

## 2023-10-30 RX ADMIN — FINASTERIDE 5 MG: 5 TABLET, FILM COATED ORAL at 04:51

## 2023-10-30 RX ADMIN — DOXYCYCLINE 100 MG: 100 INJECTION, POWDER, LYOPHILIZED, FOR SOLUTION INTRAVENOUS at 18:16

## 2023-10-30 RX ADMIN — FUROSEMIDE 40 MG: 10 INJECTION, SOLUTION INTRAVENOUS at 04:55

## 2023-10-30 ASSESSMENT — ENCOUNTER SYMPTOMS
SHORTNESS OF BREATH: 0
DIARRHEA: 0
PSYCHIATRIC NEGATIVE: 1
BLOOD IN STOOL: 0
SINUS PAIN: 0
WHEEZING: 0
FALLS: 1
MYALGIAS: 1
VOMITING: 0
SHORTNESS OF BREATH: 1
HEADACHES: 0
CHEST TIGHTNESS: 0
DIZZINESS: 0
CHILLS: 0
BRUISES/BLEEDS EASILY: 0
LIGHT-HEADEDNESS: 0
ABDOMINAL DISTENTION: 0
WOUND: 0
COUGH: 0
PALPITATIONS: 0
ABDOMINAL PAIN: 0
STRIDOR: 0
NAUSEA: 0
FEVER: 0
DIAPHORESIS: 0

## 2023-10-30 ASSESSMENT — COPD QUESTIONNAIRES
DURING THE PAST 4 WEEKS HOW MUCH DID YOU FEEL SHORT OF BREATH: SOME OF THE TIME
DO YOU EVER COUGH UP ANY MUCUS OR PHLEGM?: YES, A FEW DAYS A WEEK OR MONTH
HAVE YOU SMOKED AT LEAST 100 CIGARETTES IN YOUR ENTIRE LIFE: NO/DON'T KNOW
COPD SCREENING SCORE: 4

## 2023-10-30 ASSESSMENT — COGNITIVE AND FUNCTIONAL STATUS - GENERAL
SUGGESTED CMS G CODE MODIFIER MOBILITY: CL
WALKING IN HOSPITAL ROOM: A LOT
MOVING TO AND FROM BED TO CHAIR: A LOT
CLIMB 3 TO 5 STEPS WITH RAILING: TOTAL
STANDING UP FROM CHAIR USING ARMS: A LOT
TURNING FROM BACK TO SIDE WHILE IN FLAT BAD: A LOT
MOBILITY SCORE: 11
MOVING FROM LYING ON BACK TO SITTING ON SIDE OF FLAT BED: A LOT

## 2023-10-30 ASSESSMENT — GAIT ASSESSMENTS
ASSISTIVE DEVICE: FRONT WHEEL WALKER
DEVIATION: DECREASED BASE OF SUPPORT;BRADYKINETIC
GAIT LEVEL OF ASSIST: MINIMAL ASSIST
DISTANCE (FEET): 7

## 2023-10-30 ASSESSMENT — FIBROSIS 4 INDEX: FIB4 SCORE: 2.86

## 2023-10-30 NOTE — CONSULTS
Physical Medicine and Rehabilitation Consultation              Date of initial consultation: 10/30/2023  Requesting provider: ordered by Stefan Davis M.D. at 10/30/23 1158   Consulting provider: Sharri Hurtado D.O.  Reason for consultation: assess for acute inpatient rehab appropriateness  LOS: 4 Day(s)    Chief complaint: rhabdomyolysis, found down.     HPI: The patient is a 82 y.o. male with a past medical history of afib on coagulation, parkinson's, HTN, and pulmonary HTN;  who presented on 10/26/2023  8:39 PM as a transfer with rhabdomyolysis after being found down and on the ground for about 12 hrs. Per documentation, patient missed his doctors appointment, which trigged patient to be checked on. Patient was on the ground after falling out of bed and had not been able to get up for about 12 hrs. Upon eval at the OSH, patient had a CPK of 1100 and elevated troponin. Patient was transferred to [facility] for higher level of care. Upon eval at [facility], patient was found to be anemic with Hgb 8.0  and troponin was elevated to 651. Patient was admitted for NSTEMI, cardiology was consulted. Echo showed EF of 52 % with akinesis of the distal septum and apex consistent with LAD territory ischemia/infarct. Cardiology recommending plavix + statin, however statin held due to elevated LFTs. Patient was previously on a heparin drip for NSTEMI; post heparin drip patient's Hgb was 6.5, which has since improved to 9.4. Patient's pradaxa for Afib is held.  Patient's hospital course has been notable for uptrending leukocytosis, patient has been started of ceftriaxone and doxyycline . Patient has been noted to have gurgling with therapy, SLP ordered, SLP eval pending. Leukocytosis may be due to aspiration PNA, CXR showing left lung base atelectasis. Patient remains on Neupro patch for PD, this is patient's own supplied medication.     Patient seen and examined at bedside, patient reports he feels tried. Patient fatigued with  conversation, need to take deep breath between sentences. Patient confirms he has a roommate that has a separate unit down stairs and one upstairs. They don't provide any caregiving, but he does call them before taking a shower so they know to make sure he gets of the shower. Patient previously able to be MOD I with ADLs and mobility with FWW. Has a  3 days per week, but has never needed any physical caregiving services and does not have someone who could help with that.     Social Hx:  Patient lives with friends/roommates in a 2 story house with  a ramp   0 BHARGAVI  At prior level of function Mod I with FWW.     Tobacco: Former smoker, quit 33 year ago   Alcohol: Denies   Drugs: Denies     THERAPY:  Restrictions: Fall Risk   PT: Functional mobility   10/28  Mod A bed mobility, Mod A sit to stand, Mod A transfers, unable to tolerate  gait     OT: ADLs  10/28 Mod A bed mobility, Mod A upper body dressing, Max A lower body dressing, Mod A sit to stand     SLP:   None     IMAGING:  EC-ECHOCARDIOGRAM COMPLETE W/O CONT  Transthoracic  Echo Report    Echocardiography Laboratory    CONCLUSIONS  Low normal ventricular systolic function.  The ejection fraction is measured to be 52% by Carrion's biplane.  There is akinesis of the mid to distal septum and apex consistent with   LAD territory ischemia/infarct.  Diastolic function is difficult to assess with arrhythmia.  Normal right ventricular size with borderline reduced systolic   function.  Unable to estimate pulmonary artery pressure due to an inadequate   visualization of the inferior vena cava.  Severe biatrial dilation.  There appears to be a prominent Chiari network, however, would consider   a transesophageal echocardiogram if there was a strong concern for   endocarditis.  Eccentric, probably mild tricuspid regurgitation.  The inferior vena cava is not well visualized.    BALJIT BUTTERFIELD  Exam Date:         10/29/2023                      08:27  Exam  Location:     Inpatient  Priority:          Routine    Ordering Physician:        JERRICA MURPHY  Referring Physician:       KATHERINE Castillo  Sonographer:               Ramses Hanna                              RDCS, RVT    Age:    82     Gender:    M  MRN:    [id number]  :    1940  BSA:    1.85   Ht (in):    67     Wt (lb):    163  Exam Type:     Complete    Indications:     Persistent atrial fibrillation, Shortness of breath,                    Acute on chronic systolic (congestive) heart failure  ICD Codes:       I48.1  R06.02  I50.23    CPT Codes:       23664    BP:   87     /   58     HR:   100  Technical Quality:       Fair    MEASUREMENTS  (Male / Female) Normal Values    * Indicates values subject to auto-interpretation  LV EF:        %    FINDINGS  Left Ventricle  Normal left ventricular chamber size. Normal left ventricular wall   thickness with a sigmoid septum. Low normal ventricular systolic   function. The ejection fraction is measured to be 52% by Carrion's   biplane. There is akinesis of the mid to distal septum and apex   consistent with LAD territory ischemia/infarct. Diastolic function is   difficult to assess with arrhythmia.    Right Ventricle  Normal right ventricular size with borderline reduced systolic   function.    Right Atrium  Severely dilated right atrium. There appears to be a prominent Chiari   network, however, would consider a transesophageal echocardiogram if   there was a strong concern for endocarditis. The inferior vena cava is   not well visualized.    Left Atrium  Severely dilated left atrium. Left atrial volume index is 51 mL/sq m.    Mitral Valve  Thickened mitral valve leaflets without stenosis. Trace mitral   regurgitation.    Aortic Valve  Tricuspid aortic valve without stenosis. Trace aortic insufficiency.    Tricuspid Valve  The tricuspid valve is not well visualized. Eccentric, probably mild   tricuspid regurgitation. Unable to estimate pulmonary artery  pressure   due to an inadequate visualization of the inferior vena cava.    Pulmonic Valve  Structurally normal pulmonic valve. Trace pulmonic insufficiency.    Pericardium  Normal pericardium without effusion.    Aorta  Normal aortic root for body surface area.    Pedro Alcocer MD  (Electronically Signed)  Final Date:     29 October 2023                   17:22        PROCEDURES:  None     PMH:  Past Medical History:   Diagnosis Date    Elevated liver function tests 6/29/2018    Hyponatremia 6/29/2018    Parkinson's disease     Thrombocytosis 7/25/2020       PSH:  History reviewed. No pertinent surgical history.    FHX:  Family History   Problem Relation Age of Onset    Other Mother 88        hip fx       Medications:  Current Facility-Administered Medications   Medication Dose    hydrocortisone sodium succinate PF (Solu-CORTEF) 100 MG injection 50 mg  50 mg    clopidogrel (Plavix) tablet 75 mg  75 mg    cefTRIAXone (Rocephin) syringe 2,000 mg  2,000 mg    doxycycline (Vibramycin) 100 mg in  mL IVPB  100 mg    [Held by provider] metoprolol SR (Toprol XL) tablet 12.5 mg  12.5 mg    furosemide (Lasix) injection 40 mg  40 mg    rotigotine (Neupro) patch 4 mg  4 mg    tamsulosin (Flomax) capsule 0.4 mg  0.4 mg    finasteride (Proscar) tablet 5 mg  5 mg    Respiratory Therapy Consult      pantoprazole (Protonix) injection 40 mg  40 mg    melatonin tablet 10 mg  10 mg    thyroid (Cisco Thyroid) tablet 60 mg  60 mg    And    thyroid (Cisco Thyroid) tablet 120 mg  120 mg    senna-docusate (Pericolace Or Senokot S) 8.6-50 MG per tablet 2 Tablet  2 Tablet    And    polyethylene glycol/lytes (Miralax) PACKET 1 Packet  1 Packet    And    magnesium hydroxide (Milk Of Magnesia) suspension 30 mL  30 mL    And    bisacodyl (Dulcolax) suppository 10 mg  10 mg    acetaminophen (Tylenol) tablet 650 mg  650 mg       Allergies:  Allergies   Allergen Reactions    Novocain Unspecified     Eye swelling and redness        Physical Exam:  Vitals: /80   Pulse (!) 119   Temp 36.8 °C (98.2 °F) (Temporal)   Resp 20   Wt 71.6 kg (157 lb 13.6 oz)   SpO2 93%   Gen: NAD, laying comfortable in bed, appears fatigued with talking   Head:  NC/AT  Eyes/ Nose/ Mouth: PERRLA, moist mucous membranes  Cardio: tachycardic to 119, good distal perfusion, warm extremities  Pulm: normal respiratory effort, no cyanosis, on RA   Abd: Soft NTND, negative borborygmi   Ext: No peripheral edema. No calf tenderness. No clubbing.  Neuro: no resting tremors     Mental status:  A&Ox4 (person, place, date, situation) answers questions appropriately follows commands  Speech: fluent, no aphasia or dysarthria    CRANIAL NERVES:  2,3: visual acuity grossly intact, PERRL  3,4,6: EOMI bilaterally, no nystagmus or diplopia  5: sensation intact to light touch bilaterally and symmetric  7: no facial asymmetry  8: hearing grossly intact      Motor: bradykinetic with LE       Upper Extremity  Myotome R L   Shoulder flexion C5 5/5 5/5   Elbow flexion C5 5/5 5/5   Wrist extension C6 5/5 5/5   Elbow extension C7 5/5 5/5   Finger flexion C8 5/5 5/5   Finger abduction T1 5/5 5/5     Lower Extremity Myotome R L   Hip flexion L2 3, limited by fatigue/5 3, limited by fatigue, holding breath during MMT /5   Knee extension L3 4/5 4/5   Ankle dorsiflexion L4 5/5 5/5   Toe extension L5 5/5 5/5   Ankle plantarflexion S1 5/5 5/5       Negative Pronator drift bilaterally    Sensory:   intact to light touch through out    DTRs: 2+ in bilateral  biceps  No clonus at bilateral ankles  Negative Chapa b/l     Tone: +cogwheeling noted    Coordination:     intact fine motor with fingers bilaterally      Labs: Reviewed and significant for   Recent Labs     10/28/23  0718 10/28/23  1308 10/29/23  0639 10/29/23  1440 10/29/23  2226 10/30/23  0559   RBC 2.59*  --  2.35*  --   --  3.38*   HEMOGLOBIN 7.1*   < > 6.5* 9.2* 8.3* 9.4*   HEMATOCRIT 24.1*   < > 21.4*  --  27.8* 30.8*    PLATELETCT 360  --  341  --   --  406    < > = values in this interval not displayed.     Recent Labs     10/28/23  2139 10/29/23  0344 10/30/23  0559   SODIUM 137 136 137   POTASSIUM 3.9 3.6 3.2*   CHLORIDE 108 109 105   CO2 14* 14* 19*   GLUCOSE 141* 122* 126*   BUN 47* 46* 47*   CREATININE 1.69* 1.46* 1.64*   CALCIUM 7.9* 7.4* 8.5     Recent Results (from the past 24 hour(s))   HGB    Collection Time: 10/29/23  2:40 PM   Result Value Ref Range    Hemoglobin 9.2 (L) 14.0 - 18.0 g/dL   HEMOGLOBIN AND HEMATOCRIT    Collection Time: 10/29/23 10:26 PM   Result Value Ref Range    Hemoglobin 8.3 (L) 14.0 - 18.0 g/dL    Hematocrit 27.8 (L) 42.0 - 52.0 %   CBC WITHOUT DIFFERENTIAL    Collection Time: 10/30/23  5:59 AM   Result Value Ref Range    WBC 13.5 (H) 4.8 - 10.8 K/uL    RBC 3.38 (L) 4.70 - 6.10 M/uL    Hemoglobin 9.4 (L) 14.0 - 18.0 g/dL    Hematocrit 30.8 (L) 42.0 - 52.0 %    MCV 91.1 81.4 - 97.8 fL    MCH 27.8 27.0 - 33.0 pg    MCHC 30.5 (L) 32.3 - 36.5 g/dL    RDW 53.7 (H) 35.9 - 50.0 fL    Platelet Count 406 164 - 446 K/uL    MPV 10.2 9.0 - 12.9 fL   Comp Metabolic Panel    Collection Time: 10/30/23  5:59 AM   Result Value Ref Range    Sodium 137 135 - 145 mmol/L    Potassium 3.2 (L) 3.6 - 5.5 mmol/L    Chloride 105 96 - 112 mmol/L    Co2 19 (L) 20 - 33 mmol/L    Anion Gap 13.0 7.0 - 16.0    Glucose 126 (H) 65 - 99 mg/dL    Bun 47 (H) 8 - 22 mg/dL    Creatinine 1.64 (H) 0.50 - 1.40 mg/dL    Calcium 8.5 8.5 - 10.5 mg/dL    Correct Calcium 9.2 8.5 - 10.5 mg/dL    AST(SGOT) 223 (H) 12 - 45 U/L    ALT(SGPT) 565 (H) 2 - 50 U/L    Alkaline Phosphatase 125 (H) 30 - 99 U/L    Total Bilirubin 0.3 0.1 - 1.5 mg/dL    Albumin 3.1 (L) 3.2 - 4.9 g/dL    Total Protein 5.6 (L) 6.0 - 8.2 g/dL    Globulin 2.5 1.9 - 3.5 g/dL    A-G Ratio 1.2 g/dL   ESTIMATED GFR    Collection Time: 10/30/23  5:59 AM   Result Value Ref Range    GFR (CKD-EPI) 42 (A) >60 mL/min/1.73 m 2         ASSESSMENT:  Patient is a 82 y.o. male admitted with  NSTEMI     [facility] Code / Diagnosis to Support: 0009 - Cardiac    Rehabilitation: Impaired ADLs and mobility  Patient is a poor candidate for inpatient rehab based on poor endurance and lack of DC support     Barriers to transfer include: Insurance authorization, TCCs to verify disposition, medical clearance and bed availability     Additional Recommendations:  NSTEMI  -admitted after found down at home   - upon admit patient had elevated troponin at OSH   - transferred to [facility] for higher level of care  - cardiology consulted, patient placed on heparin drip   - now off heaprin drip after Hgb 6.5   - on plavix with plans to start statin when LFTs improve   - ECHO showed EF of 52% with akinesis associated LAD territory infarct   - continue with PT/OT as able     Parkinson's Disease   - at baseline patient is MOD I with FWW   - on Neupro patch, patient's own medication   - followed by Dr. Jesus at [facility]     Leukocytosis   - WBC up to 13.5   - started ceftriaxone and doxycycline   - suspected WBC due to possible asp PNA   - CXR showed left lung base atelectasis or PNA, on RA, but fatigued and short of breath with talking   - SLP ordered     Dysphagia   - concern for dysphagia secondary to PD   - SLP eval ordered     Rhadomyolysis   YASEMIN   -admitted after GLF, on ground for 12 hrs   - CPK elevated at time fo admit   - Cr tending up   -10/30 Cr 1.64   - primary team monitoring BMP     ABLA   - Hgb down to 6.5 after Heparin drip   - heparin drip stopped, now on plavix  - Hgb improved to 9.4, occult stool pending     Dispo:  - patient is currently functioning below their level of baseline, recommend post acute rehab  - recommend SNF for prolonged rehab course   - Not a candidate for IRF level therapy due to lack of  DC support and impaired endurance, unlikely to tolerate 3hrs of therapy 5 days per week   - PM&R will sign off         Medical  Complexity:  NSTEMI  Parkinson'S Disease   Leukocytosis   Dysphagia   Rhadomyolysis   ABLA   Impaired mobility and ADLs       DVT PPX: SCDs       Thank you for allowing us to participate in the care of this patient.     Patient was seen for 81 minutes on unit/floor of which > 50% of time was spent on counseling and coordination of care regarding the above, including prognosis, risk reduction, benefits of treatment, and options for next stage of care.    Sharri Hurtado D.O.   Physical Medicine and Rehabilitation     Please note that this dictation was created using voice recognition software. I have made every reasonable attempt to correct obvious errors, but there may be errors of grammar and possibly content that I did not discover before finalizing the note.

## 2023-10-30 NOTE — PROGRESS NOTES
Hospital Medicine Daily Progress Note    Date of Service  10/30/2023    Chief Complaint  Elias Casanova is a 82 y.o. male admitted 10/26/2023 with NSTEMI, rhabdomyolysis     Hospital Course  This is a  82 y.o. male who was transferred from outside facility 10/26/2023 with rhabdomyolysis and elevated troponin.  PMH of A-fib on anticoagulation, Parkinson disease, hypertension, pulmonary hypertension  He was found on the ground, says he had fallen out of bed, he does not believe he lost consciousness.  He was too weak to get back up and thinks he was on the ground for about 12 hours or more.  He is found to have rhabdomyolysis at outside facility with CPK of 1100. He was also found to have elevated troponin and was sent here for high level of care     Interval Problem Update  Patient seen and examined, weak tirred, denie chest pain, troponin trending up, and his BP is also low. I held his metoprolol today as his SBP went as low as 70.   His CPK also trending up so he has been started on IV fluid, but will need to be carefull as patient has already lower extremity edema.  Cardiology was consulted for concern for NSTEMI and he has been started on heparin drip, an echo has been ordered   His sister is at bed side and we discussed with his sister and patient regarding goals of care code status has been changed to DNR/DNI   Initially declined echo but sister said will talk and try to convince him to have the echo   Prognosis is really guarded at this time   Restarted back his parkinson medication   10/28: Patient seen and examined, daughter at bedside updated on gals of care.  Cardiology following case discussed with Dr. Kelly , echo pending, troponin up to around 1600. Cont on heparin drip.  Hypotension, he has h/o of adrenal insufficiency with give IV hydrocortisone for stress dose steroids  CPK around 1100 today close monitoring for renal failure   Prognosis guarded   10/29: Patient seen and examined, today, his  hemoglobin has dropped down to 6.5 this AM. Will give a unit of RBC, stoping heparin drip and also holding aspirin for now.  Not on IV fluid but on lasix now as patient is fluid overload.  A rapid was called overnight, CXr concern for pneumonia started on IV abx.  Cardiology following , echo pending case discussed appreciate rec.   Monitor his Renal failure and also CPK   Prognosis Guarded   10/30: Patient seen and examined, he had a low hemoglobin of 6.5 yesterday and was given a unit of blood and this brought hsi hemoglobin up to 9.4 so unclear if that 6.5 was an error.  But have ordered for FOBT to r/o any GI bleed. If that positive will consider consulting GI  Seen by cardiology again today and as per cardiology no antiplatelet or  OAC until his anemia source clarified.  I have  also consulted palliative team  to discuss goals of care    I have discussed this patient's plan of care and discharge plan at IDT rounds today with Case Management, Nursing, Nursing leadership, and other members of the IDT team.    Consultants/Specialty  cardiology    Code Status  DNAR/DNI    Disposition  The patient is not medically cleared for discharge to home or a post-acute facility.      I have placed the appropriate orders for post-discharge needs.    Review of Systems  Review of Systems   Constitutional:  Negative for chills and fever.   HENT:  Negative for congestion, nosebleeds and sinus pain.    Respiratory:  Negative for cough and shortness of breath.    Cardiovascular:  Negative for chest pain.   Gastrointestinal:  Negative for abdominal pain, diarrhea, nausea and vomiting.   Genitourinary:  Negative for dysuria and urgency.   Musculoskeletal:  Positive for falls and myalgias.   All other systems reviewed and are negative.       Physical Exam  Temp:  [36.3 °C (97.3 °F)-36.8 °C (98.2 °F)] 36.8 °C (98.2 °F)  Pulse:  [] 119  Resp:  [18-20] 20  BP: ()/(53-88) 117/80  SpO2:  [91 %-97 %] 93 %    Physical  Exam  Constitutional:       Appearance: Normal appearance.   HENT:      Head: Normocephalic and atraumatic.      Mouth/Throat:      Mouth: Mucous membranes are moist.      Pharynx: Oropharynx is clear. No oropharyngeal exudate or posterior oropharyngeal erythema.   Eyes:      General: No scleral icterus.  Cardiovascular:      Rate and Rhythm: Normal rate and regular rhythm.      Pulses: Normal pulses.      Heart sounds: Normal heart sounds. No murmur heard.  Pulmonary:      Effort: Pulmonary effort is normal. No respiratory distress.      Breath sounds: Normal breath sounds. No wheezing.   Abdominal:      Palpations: Abdomen is soft.      Tenderness: There is no abdominal tenderness.   Musculoskeletal:         General: No swelling or tenderness. Normal range of motion.      Cervical back: Normal range of motion.   Skin:     General: Skin is warm and dry.   Neurological:      General: No focal deficit present.      Mental Status: He is alert and oriented to person, place, and time. Mental status is at baseline.   Psychiatric:         Mood and Affect: Mood normal.         Fluids    Intake/Output Summary (Last 24 hours) at 10/30/2023 1340  Last data filed at 10/30/2023 1104  Gross per 24 hour   Intake 440 ml   Output 1050 ml   Net -610 ml       Laboratory  Recent Labs     10/28/23  0718 10/28/23  1308 10/29/23  0639 10/29/23  1440 10/29/23  2226 10/30/23  0559   WBC 6.7  --  12.0*  --   --  13.5*   RBC 2.59*  --  2.35*  --   --  3.38*   HEMOGLOBIN 7.1*   < > 6.5* 9.2* 8.3* 9.4*   HEMATOCRIT 24.1*   < > 21.4*  --  27.8* 30.8*   MCV 93.1  --  91.1  --   --  91.1   MCH 27.4  --  27.7  --   --  27.8   MCHC 29.5*  --  30.4*  --   --  30.5*   RDW 54.1*  --  52.7*  --   --  53.7*   PLATELETCT 360  --  341  --   --  406   MPV 10.2  --  10.6  --   --  10.2    < > = values in this interval not displayed.     Recent Labs     10/28/23  2139 10/29/23  0344 10/30/23  0559   SODIUM 137 136 137   POTASSIUM 3.9 3.6 3.2*   CHLORIDE 108  109 105   CO2 14* 14* 19*   GLUCOSE 141* 122* 126*   BUN 47* 46* 47*   CREATININE 1.69* 1.46* 1.64*   CALCIUM 7.9* 7.4* 8.5                       Imaging  EC-ECHOCARDIOGRAM COMPLETE W/O CONT   Final Result      DX-CHEST-PORTABLE (1 VIEW)   Final Result      1.  Hypoinflation with elevated LEFT hemidiaphragm and LEFT lung base atelectasis or pneumonia.   2.  Probable mild edema.   3.  No pneumothorax.           Assessment/Plan  * NSTEMI (non-ST elevated myocardial infarction) (HCC)- (present on admission)  Assessment & Plan  Troponin significantly elevated at outside facility and trending up, in the 600s here  EKG from transferring facility not concerning for acute ischemia, repeat EKG here ordered  Patient fell out of bed and was unable to get up, he does have a history of Parkinson's disease.  Unclear if he had a coronary event that resulted in him falling  I have consulted cardiology and discussed case with Dr. Kelly   Will continue on heparin drip for now, echo has been ordered since troponin trending up   No cardiac cath at this time as patient not a good candidate for it.  He declined echo initially now family at bedside and we had discussion, with patient sister who will discuss with him again to see if she can convince him to have the echo   close monitoring on tele   Echo showing ischemia/ MI on the LAD territory   Cardiology following  As per cardiology to hold off on his antiplatelet or OAC forhis A.fibb due to his anemia  I have ordered a FOBT to r/o GI bleed     Advance care planning  Assessment & Plan  His sister is now at bedside and we had a long discussion with patient and his sister regarding goals of care.  Since patient has declined echo today we discussed regarding code status and also comfort care and hospice  Will change patient to DNR/DNI for now , no hospice or comfort care  Sister will talk and try to convince him to see if he is willing to have the echo   More then 18 min spent on discussion  for goals of care alone       Anemia- (present on admission)  Assessment & Plan  Baseline hemoglobin 12-14.  Was 8.9 on presentation.    His OAC and antiplatelet have been held. I have ordered for FOBT to r/o GI bleed if that positive will consult GI   His hemoglobin yesterday was 6.5 and got 1 unit of RBC which brought his hemoglobin up to 9.4 uncleare if the 6.5 was an eror or possible due to his fluid overload   I have ordered for FOBT if that is positive will consult GI     Non-traumatic rhabdomyolysis- (present on admission)  Assessment & Plan  CPK continues to trend up starting IV fluid but need to be careful  as patient is edematous   This was also discussed with family     Persistent atrial fibrillation (HCC)- (present on admission)  Assessment & Plan  Follows with cardiology, holding metoprolol as his BP is low   Holding pradaxa as he is on heparin drip     Parkinson disease- (present on admission)  Assessment & Plan  Cont outpatient regimen   PT, OT ordered       VTE prophylaxis: scd    The very real possibilty of a deterioration of this patient's condition required the highest level of my preparedness for sudden, emergent intervention.  I provided  services, which included medication orders, frequent reevaluations of the patient's condition and response to treatment, ordering and reviewing test results, and discussing the case with various consultants.  The  time associated with the care of the patient was 51  minutes.             I have performed a physical exam and reviewed and updated ROS and Plan today (10/30/2023). In review of yesterday's note (10/29/2023), there are no changes except as documented above.

## 2023-10-30 NOTE — THERAPY
Physical Therapy   Daily Treatment     Patient Name: Elias Casanova  Age:  82 y.o., Sex:  male  Medical Record #: [id number]  Today's Date: 10/30/2023     Precautions  Precautions: Fall Risk  Comments: hx of PD    Assessment    Today, pt remains limited by stiffness, needing extra time, but able. Pt needed most assist with OOB and BTB, but once up and walking side steps along EOB with min A, pt improved with repetition. Pt did show fatigue, needing seated rest before continued ambulation for a total of 7 feet. See below for details.     Plan    Treatment Plan Status: Continue Current Treatment Plan  Type of Treatment: Bed Mobility, Gait Training, Neuro Re-Education / Balance, Self Care / Home Evaluation, Stair Training, Therapeutic Activities, Therapeutic Exercise  Treatment Frequency: 4 Times per Week  Treatment Duration: Until Therapy Goals Met    DC Equipment Recommendations: Unable to determine at this time  Discharge Recommendations: Recommend post-acute placement for additional physical therapy services prior to discharge home           Objective       10/30/23 1408   Charge Group   Charges  Yes   PT Gait Training (Units) 1   PT Therapeutic Activities (Units) 2   Total Time Spent   PT Total Time Yes   PT Gait Training Time Spent (Mins) 15   PT Therapeutic Activities Time Spent (Mins) 23   PT Total Time Spent (Calculated) 38   Precautions   Precautions Fall Risk   Comments hx of PD   Vitals   O2 Delivery Device None - Room Air   Pain 0 - 10 Group   Therapist Pain Assessment During Activity;Nurse Notified  (back pain from scoliosis with back to bed. Not rated.)   Cognition    Cognition / Consciousness X   Speech/ Communication Delayed Responses   Level of Consciousness Alert   Ability To Follow Commands 1 Step   Safety Awareness Impaired   Comments pt is difficult to understand at times.   Active ROM Lower Body    Active ROM Lower Body  X   Comments knees flexed in standing, with cues, pt can almost extend fully,  remains 10 degrees short of full extension.   Strength Lower Body   Lower Body Strength  X   Gross Strength Generalized Weakness, Equal Bilaterally   Neurological Concerns   Neurological Concerns Yes   Comments stiff, slow moving, PD   Balance   Sitting Balance (Static) Fair   Sitting Balance (Dynamic) Fair -   Standing Balance (Static) Poor   Standing Balance (Dynamic) Poor   Weight Shift Sitting Poor   Weight Shift Standing Fair   Skilled Intervention Verbal Cuing;Sequencing   Bed Mobility    Supine to Sit Moderate Assist   Sit to Supine Maximal Assist   Scooting Maximal Assist  (seated scoot, unable)   Rolling Moderate Assist to Rt.;Moderate Assist to Lt.   Gait Analysis   Gait Level Of Assist Minimal Assist   Assistive Device Front Wheel Walker   Distance (Feet) 7  (side steps along EOB, slow, but able with time.)   # of Times Distance was Traveled 1   Deviation Decreased Base Of Support;Bradykinetic   Skilled Intervention Verbal Cuing;Sequencing   Functional Mobility   Sit to Stand Moderate Assist  (first rep mod A, second and 3rd rep min A)   Transfer Method Stand Step   Skilled Intervention Verbal Cuing;Sequencing   How much difficulty does the patient currently have...   Turning over in bed (including adjusting bedclothes, sheets and blankets)? 2   Sitting down on and standing up from a chair with arms (e.g., wheelchair, bedside commode, etc.) 2   Moving from lying on back to sitting on the side of the bed? 2   How much help from another person does the patient currently need...   Moving to and from a bed to a chair (including a wheelchair)? 2   Need to walk in a hospital room? 2   Climbing 3-5 steps with a railing? 1   6 clicks Mobility Score 11   Activity Tolerance   Standing 5 min   Short Term Goals    Short Term Goal # 1 pt will perform supine <> sit with SPV in 6 visits for improved independence   Goal Outcome # 1 goal not met   Short Term Goal # 2 pt will perform all functional xfrs with SPV in 6  visits for improved independence   Goal Outcome # 2 Goal not met   Short Term Goal # 3 pt will ambulate 50ft with FWW and SPV in 6 visits for improved independence   Goal Outcome # 3 Goal not met   Education Group   Role of Physical Therapist Patient Response Patient;Acceptance;Explanation;Verbal Demonstration   Additional Comments heavy sequencing cues for gait with FWW. PT following, but needs extra time.   Physical Therapy Treatment Plan   Physical Therapy Treatment Plan Continue Current Treatment Plan   Anticipated Discharge Equipment and Recommendations   DC Equipment Recommendations Unable to determine at this time   Discharge Recommendations Recommend post-acute placement for additional physical therapy services prior to discharge home   Interdisciplinary Plan of Care Collaboration   IDT Collaboration with  Nursing   Patient Position at End of Therapy In Bed;Call Light within Reach;Tray Table within Reach;Phone within Reach;Bed Alarm On   Collaboration Comments nsg updated   Session Information   Date / Session Number  10/30-2 (2/4, 11/3)

## 2023-10-30 NOTE — DISCHARGE PLANNING
Please review the consult from Dr. Hurtado regarding post acute recommendations.  [facility] will no longer follow.  Please reach out to myself with any questions.

## 2023-10-30 NOTE — DISCHARGE PLANNING
PM&R referral from Dr. Davis,  potential parkinsonism exacerbation. Ongoing medical management as well as therapy need. Potential family and friend support. Medicare provider. Physiatry to consult per protocol.

## 2023-10-30 NOTE — CARE PLAN
The patient is Stable - Low risk of patient condition declining or worsening    Shift Goals  Clinical Goals: stable bp, check lab values,maintain safety  Patient Goals: rest  Family Goals: updates    Progress made toward(s) clinical / shift goals:      Problem: Skin Integrity  Goal: Skin integrity is maintained or improved  Description: Target End Date:  Prior to discharge or change in level of care    Document interventions on Skin Risk/Logan flowsheet groups and corresponding LDA    1.  Assess and monitor skin integrity, appearance and/or temperature  2.  Assess risk factors for impaired skin integrity and/or pressures ulcers  3.  Implement precautions to protect skin integrity in collaboration with interdisciplinary team  4.  Implement pressure ulcer prevention protocol if at risk for skin breakdown  5.  Confirm wound care consult if at risk for skin breakdown  6.  Ensure patient use of pressure relieving devices  (Low air loss bed, waffle overlay, heel protectors, ROHO cushion, etc)  Outcome: Progressing     Problem: Fall Risk  Goal: Patient will remain free from falls  Description: Target End Date:  Prior to discharge or change in level of care    Document interventions on the Rosalina Benton Fall Risk Assessment    1.  Assess for fall risk factors  2.  Implement fall precautions  Outcome: Progressing       Patient is not progressing towards the following goals:      Problem: Knowledge Deficit - Standard  Goal: Patient and family/care givers will demonstrate understanding of plan of care, disease process/condition, diagnostic tests and medications  Description: Target End Date:  1-3 days or as soon as patient condition allows    Document in Patient Education    1.  Patient and family/caregiver oriented to unit, equipment, visitation policy and means for communicating concern  2.  Complete/review Learning Assessment  3.  Assess knowledge level of disease process/condition, treatment plan, diagnostic tests and  medications  4.  Explain disease process/condition, treatment plan, diagnostic tests and medications  Outcome: Not Progressing     Problem: Mobility  Goal: Patient's capacity to carry out activities will improve  Description: Target End Date:  Prior to discharge or change in level of care    1.  Assess for barriers to mobility/activity  2.  Implement activity per interdisciplinary team recommendations  3.  Target activity level identified and patient/family/caregiver aware of goal  4.  Provide assistive devices  5.  Instruct patient/caregiver on proper use of assistive/adaptive devices  6.  Schedule activities and rest periods to decrease effects of fatigue  7.  Encourage mobilization to extent of ability  8.  Maintain proper body alignment  9.  Provide adequate pain management to allow progressive mobilization  10. Implement pace maker precautions as needed  Outcome: Not Progressing     Problem: Self Care  Goal: Patient will have the ability to perform ADLs independently or with assistance (bathe, groom, dress, toilet and feed)  Description: Target End Date:  Prior to discharge or change in level of care    Document on ADL flowsheet    1.  Assess the capability and level of deficiency to perform ADLs  2.  Encourage family/care giver involvement  3.  Provide assistive devices  4.  Consider PT/OT evaluations  5.  Maintain support, give positive feedback, encourage self-care allowing extra time and verbal cuing as needed  6.  Avoid doing something for patients they can do themselves, but provide assistance as needed  7.  Assist in anticipating/planning individual needs  8.  Collaborate with Case Management and  to meet discharge needs  Outcome: Not Progressing     Problem: Fluid Volume  Goal: Fluid volume balance will be maintained  Description: Target End Date:  Prior to discharge or change in level of care    Document on I/O flowsheet    1.  Monitor intake and output as ordered  2.  Promote oral  intake as appropriate  3.  Report inadequate intake or output to physician  4.  Administer IV therapy as ordered  5.  Weights per provider order  6.  Assess for signs and symptoms of bleeding  7.  Monitor for signs of fluid overload (respiratory changes, edema, weight gain, increased abdominal girth)  8.  Monitor of signs for inadequate fluid volume (poor skin turgor, dry mucous membranes)  9.  Instruct patient on adherence to fluid restrictions  Outcome: Progressing

## 2023-10-30 NOTE — PROGRESS NOTES
Cardiology Follow Up Progress Note    Date of Service  10/30/2023    Attending Physician  Stefan Davis M.D.    Chief Complaint   Fall    HPI  Edward Casanova is a 82 y.o. male admitted 10/26/2023 with prior medical history of chronic Atrial Fibrillation (on Pradaxa), Parkinson's disease, and primary adrenal insufficiency (on prednisone) presented after a ground level fall, found down after 12 hours, and rhabdomyolysis. The work up revealed NSTEMI. Patient has been treated medically for NSTEMI. His stay is complicated by anemia requiring a blood transfusion.     Interim Events  Tele monitoring: chronic A fib 80's-110, rate controlled. BP down to 88/55 on 10/29, improved today to 's.   Less drowsy today. Feeling better, improved shortness of breath.   B/L LE swelling 2+ and pitting.   Hgb up to 9.4 after one unit RBCs yesterday.   Continue to hold Aspirin, Plavix, and Pradaxa.   Patient denies chest pain.     Review of Systems  Review of Systems   Constitutional:  Negative for chills, diaphoresis and fever.   HENT: Negative.     Respiratory:  Positive for shortness of breath (improved per patient). Negative for cough, chest tightness, wheezing and stridor.    Cardiovascular:  Positive for leg swelling. Negative for chest pain and palpitations.   Gastrointestinal:  Negative for abdominal distention, abdominal pain, blood in stool, diarrhea, nausea and vomiting.   Genitourinary:  Negative for hematuria.   Skin:  Negative for rash and wound.   Neurological:  Negative for dizziness, syncope, light-headedness and headaches.   Hematological:  Does not bruise/bleed easily.   Psychiatric/Behavioral: Negative.         Vital signs in last 24 hours  Temp:  [36.3 °C (97.3 °F)-36.8 °C (98.2 °F)] 36.8 °C (98.2 °F)  Pulse:  [] 110  Resp:  [18-22] 19  BP: ()/(49-88) 101/64  SpO2:  [91 %-98 %] 91 %    Physical Exam  Physical Exam  Constitutional:       Appearance: Normal appearance.   HENT:      Head: Normocephalic and  atraumatic.      Mouth/Throat:      Mouth: Mucous membranes are moist.   Cardiovascular:      Rate and Rhythm: Rhythm irregular.      Pulses: Normal pulses.      Heart sounds: No murmur heard.     No friction rub. No gallop.   Pulmonary:      Effort: Pulmonary effort is normal.      Breath sounds: Rales present.   Abdominal:      General: Bowel sounds are normal. There is no distension.   Musculoskeletal:      Right lower leg: Edema (2+) present.      Left lower leg: Edema (2+) present.   Skin:     Coloration: Skin is pale.      Findings: No bruising.   Neurological:      Mental Status: He is oriented to person, place, and time.   Psychiatric:         Thought Content: Thought content normal.         Judgment: Judgment normal.         Lab Review  Lab Results   Component Value Date/Time    WBC 13.5 (H) 10/30/2023 05:59 AM    RBC 3.38 (L) 10/30/2023 05:59 AM    HEMOGLOBIN 9.4 (L) 10/30/2023 05:59 AM    HEMATOCRIT 30.8 (L) 10/30/2023 05:59 AM    MCV 91.1 10/30/2023 05:59 AM    MCH 27.8 10/30/2023 05:59 AM    MCHC 30.5 (L) 10/30/2023 05:59 AM    MPV 10.2 10/30/2023 05:59 AM      Lab Results   Component Value Date/Time    SODIUM 137 10/30/2023 05:59 AM    POTASSIUM 3.2 (L) 10/30/2023 05:59 AM    CHLORIDE 105 10/30/2023 05:59 AM    CO2 19 (L) 10/30/2023 05:59 AM    GLUCOSE 126 (H) 10/30/2023 05:59 AM    BUN 47 (H) 10/30/2023 05:59 AM    CREATININE 1.64 (H) 10/30/2023 05:59 AM      Lab Results   Component Value Date/Time    ASTSGOT 223 (H) 10/30/2023 05:59 AM    ALTSGPT 565 (H) 10/30/2023 05:59 AM     Lab Results   Component Value Date/Time    CHOLSTRLTOT 95 (L) 10/27/2023 12:04 AM    LDL 59 10/27/2023 12:04 AM    HDL 32 (A) 10/27/2023 12:04 AM    TRIGLYCERIDE 20 10/27/2023 12:04 AM    TROPONINT 1623 (H) 10/29/2023 03:44 AM       Recent Labs     10/28/23  2139   NTPROBNP [id number]*       Cardiac Imaging and Procedures Review  EKG:  10/28/2023 is Atrial Fibrillation, rate 108    Echocardiogram:  10/29/2023  CONCLUSIONS  Low  normal ventricular systolic function.  The ejection fraction is measured to be 52% by Carrion's biplane.  There is akinesis of the mid to distal septum and apex consistent with   LAD territory ischemia/infarct.  Diastolic function is difficult to assess with arrhythmia.  Normal right ventricular size with borderline reduced systolic   function.  Unable to estimate pulmonary artery pressure due to an inadequate   visualization of the inferior vena cava.  Severe biatrial dilation.  There appears to be a prominent Chiari network, however, would consider   a transesophageal echocardiogram if there was a strong concern for   endocarditis.  Eccentric, probably mild tricuspid regurgitation.  The inferior vena cava is not well visualized.    Imaging  Chest X-Ray:  10/28/2023  IMPRESSION:  1.  Hypoinflation with elevated LEFT hemidiaphragm and LEFT lung base atelectasis or pneumonia.  2.  Probable mild edema.  3.  No pneumothorax.       Assessment/Plan  No new Assessment & Plan notes have been filed under this hospital service since the last note was generated.  Service: Cardiology      NSTEMI   - Non-ST elevation MI with troponin levels peaked at 1,724 on 10/28/2023. Trending down currently to 1,623 (10/30/2023).   - Patient denies chest pain, states he never had it.   - Medical management is elected by patient and family. Patient declined cardiac catheterization.  - Echo shows akinesis of the mid to distal septum and apex consistent with   LAD territory ischemia/infarct.  -  Hold off on antiplatelets at this time. Hgb 9.4 today and no overt bleeding. Recommend GI work up to identify the source of bleed.   - patient has been hypotensive and not been able to tolerate any beta-blockers or ARB, consider starting once blood pressure is improved  - Consider starting a atorvastatin once liver functions are stable    2. Chronic Atrial Fibrillation   - rate controlled currently (). Unable to tolerate metoprolol due to  hypotension currently. Restart when able.    - patient is on Pradaxa at home. In the setting of NSTEMI, Heparin gtt was started initially. It was then stopped due to a drop of Hgb to 6.5. Improved after a unit of RBCs.    - recommend not restarting Pradaxa as the risk of bleeding currently outweighs the benefit.               Future Appointments   Date Time Provider Department Center   12/14/2023 12:45 PM JOVANNI Garcia [facility] None       Please contact me with any questions.  Thank you for allowing us to participate in the care of this patient.    Please see Dr. Stuart attestation for further details and MDM.     IAnnette A.P.R.N. performed a portion of the service face-to-face with the same  patient on the same date of service INDEPENDENTLY OF Dr. Stuart FOR 15 MINUTES. I was personally involved in reviewing and conducting elements of the history, exam and/or medical decision making, including the information as described above.    Please note this dictation was created using voice recognition software.  I have made every reasonable attempt to correct obvious errors, but there may be errors of grammar and possibly content that I did not discover before finalizing the note.     Annette Sanchez, MSN, APRN  [facility]  [phone]

## 2023-10-30 NOTE — DISCHARGE PLANNING
"Care Transition Team Assessment    LMSW met with pt and pt's sister, Lazara, at bedside to complete assessment. Pt A&Ox4 and able to verify the information on the face sheet. Pt lives with two roommates. Pt uses a walker at baseline. Pt reported that he his sister, two friends, and two roommates are good support. Pt denies any SA or MH concerns. 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    LMSW met with pt and pt's sister regarding dcp. Per pt, he is agreeable to post-acute. Wants to dc to [facility]. LMSW placed an order for PMR. Pending response. Pt did not want to give choice for SNF until he hears from [facility] . Pt's sister lives 11 hours away but reported she will be coming back Wednesday morning and will remain here to support pt constantly. Per pt and pt's sister, pt's two friends have been visiting him during the hospitalization and been \"very helpful.\"    Information Source  Orientation Level: Oriented X4  Information Given By: Patient, Relative  Informant's Name: Debbie  Who is responsible for making decisions for patient? : Patient    Readmission Evaluation  Is this a readmission?: No    Elopement Risk  Legal Hold: No  Ambulatory or Self Mobile in Wheelchair: No-Not an Elopement Risk  Disoriented: No  Psychiatric Symptoms: None  History of Wandering: No  Elopement this Admit: No  Vocalizing Wanting to Leave: No  Displays Behaviors, Body Language Wanting to Leave: No-Not at Risk for Elopement  Elopement Risk: Not at Risk for Elopement    Interdisciplinary Discharge Planning  Primary Care Physician: Luz Quiroga  Lives with - Patient's Self Care Capacity: Friends  Support Systems: Family Member(s), Friends / Neighbors  Housing / Facility: [address]  Prior Services: None  Patient Prefers to be Discharged to:: [facility]  Durable Medical Equipment: Walker    Discharge Preparedness  What is your plan after discharge?: Other (comment) ([facility])  What are your discharge supports?: Sibling, Other (comment) (Friends and roommates)  Prior Functional Level: Independent with Activities of Daily Living, Independent with Medication Management, Uses Walker, Ambulatory    Functional Assesment  Prior Functional Level: Independent with Activities of Daily Living, Independent with Medication Management, Uses Walker, Ambulatory    Finances  Financial Barriers to Discharge: No  Prescription Coverage: Yes    Vision / Hearing Impairment  Vision Impairment : No  Hearing Impairment : No    Advance Directive  Advance Directive?: DPOA for Health Care  Durable Power of  Name and Contact : Servando Casanova [phone]    Domestic Abuse  Have you ever been the victim of abuse or violence?: No  Physical Abuse or Sexual Abuse: No  Verbal Abuse or Emotional Abuse: No  Possible Abuse/Neglect Reported to:: Not Applicable    Psychological Assessment  History of Substance Abuse: Alcohol  Date Last Used - Alcohol: 40 years ago  Substance Abuse Comments: Does seminars on how to remain sober  History of Psychiatric Problems: No    Anticipated Discharge Information  Discharge Disposition: Disch to IP rehab facility or distinct part unit (62)

## 2023-10-31 ENCOUNTER — APPOINTMENT (OUTPATIENT)
Dept: RADIOLOGY | Facility: MEDICAL CENTER | Age: 83
DRG: 280 | End: 2023-10-31
Attending: INTERNAL MEDICINE
Payer: MEDICARE

## 2023-10-31 LAB
ANION GAP SERPL CALC-SCNC: 13 MMOL/L (ref 7–16)
BASOPHILS # BLD AUTO: 0.1 % (ref 0–1.8)
BASOPHILS # BLD: 0.02 K/UL (ref 0–0.12)
BUN SERPL-MCNC: 43 MG/DL (ref 8–22)
CALCIUM SERPL-MCNC: 7.7 MG/DL (ref 8.5–10.5)
CHLORIDE SERPL-SCNC: 106 MMOL/L (ref 96–112)
CO2 SERPL-SCNC: 17 MMOL/L (ref 20–33)
CREAT SERPL-MCNC: 1.54 MG/DL (ref 0.5–1.4)
EOSINOPHIL # BLD AUTO: 0.01 K/UL (ref 0–0.51)
EOSINOPHIL NFR BLD: 0.1 % (ref 0–6.9)
ERYTHROCYTE [DISTWIDTH] IN BLOOD BY AUTOMATED COUNT: 53.1 FL (ref 35.9–50)
ERYTHROCYTE [DISTWIDTH] IN BLOOD BY AUTOMATED COUNT: 54.4 FL (ref 35.9–50)
GFR SERPLBLD CREATININE-BSD FMLA CKD-EPI: 45 ML/MIN/1.73 M 2
GLUCOSE BLD STRIP.AUTO-MCNC: 128 MG/DL (ref 65–99)
GLUCOSE SERPL-MCNC: 98 MG/DL (ref 65–99)
HCT VFR BLD AUTO: 25.4 % (ref 42–52)
HCT VFR BLD AUTO: 31.6 % (ref 42–52)
HCT VFR BLD AUTO: 33.3 % (ref 42–52)
HGB BLD-MCNC: 10.3 G/DL (ref 14–18)
HGB BLD-MCNC: 7.7 G/DL (ref 14–18)
HGB BLD-MCNC: 9.7 G/DL (ref 14–18)
IMM GRANULOCYTES # BLD AUTO: 0.1 K/UL (ref 0–0.11)
IMM GRANULOCYTES NFR BLD AUTO: 0.7 % (ref 0–0.9)
LYMPHOCYTES # BLD AUTO: 0.29 K/UL (ref 1–4.8)
LYMPHOCYTES NFR BLD: 2 % (ref 22–41)
MCH RBC QN AUTO: 27.5 PG (ref 27–33)
MCH RBC QN AUTO: 27.5 PG (ref 27–33)
MCHC RBC AUTO-ENTMCNC: 30.3 G/DL (ref 32.3–36.5)
MCHC RBC AUTO-ENTMCNC: 30.7 G/DL (ref 32.3–36.5)
MCV RBC AUTO: 89.5 FL (ref 81.4–97.8)
MCV RBC AUTO: 90.7 FL (ref 81.4–97.8)
MONOCYTES # BLD AUTO: 0.59 K/UL (ref 0–0.85)
MONOCYTES NFR BLD AUTO: 4 % (ref 0–13.4)
NEUTROPHILS # BLD AUTO: 13.82 K/UL (ref 1.82–7.42)
NEUTROPHILS NFR BLD: 93.1 % (ref 44–72)
NRBC # BLD AUTO: 0.04 K/UL
NRBC BLD-RTO: 0.3 /100 WBC (ref 0–0.2)
PLATELET # BLD AUTO: 339 K/UL (ref 164–446)
PLATELET # BLD AUTO: 423 K/UL (ref 164–446)
PMV BLD AUTO: 10.3 FL (ref 9–12.9)
PMV BLD AUTO: 10.5 FL (ref 9–12.9)
POTASSIUM SERPL-SCNC: 3 MMOL/L (ref 3.6–5.5)
RBC # BLD AUTO: 2.8 M/UL (ref 4.7–6.1)
RBC # BLD AUTO: 3.53 M/UL (ref 4.7–6.1)
SODIUM SERPL-SCNC: 136 MMOL/L (ref 135–145)
WBC # BLD AUTO: 13.1 K/UL (ref 4.8–10.8)
WBC # BLD AUTO: 14.8 K/UL (ref 4.8–10.8)

## 2023-10-31 PROCEDURE — 700101 HCHG RX REV CODE 250: Performed by: NURSE PRACTITIONER

## 2023-10-31 PROCEDURE — 700111 HCHG RX REV CODE 636 W/ 250 OVERRIDE (IP): Performed by: NURSE PRACTITIONER

## 2023-10-31 PROCEDURE — 85018 HEMOGLOBIN: CPT

## 2023-10-31 PROCEDURE — 85014 HEMATOCRIT: CPT

## 2023-10-31 PROCEDURE — 92611 MOTION FLUOROSCOPY/SWALLOW: CPT

## 2023-10-31 PROCEDURE — 97530 THERAPEUTIC ACTIVITIES: CPT | Mod: CO

## 2023-10-31 PROCEDURE — 700102 HCHG RX REV CODE 250 W/ 637 OVERRIDE(OP): Mod: JZ | Performed by: NURSE PRACTITIONER

## 2023-10-31 PROCEDURE — 700105 HCHG RX REV CODE 258: Performed by: NURSE PRACTITIONER

## 2023-10-31 PROCEDURE — 99221 1ST HOSP IP/OBS SF/LOW 40: CPT | Performed by: NURSE PRACTITIONER

## 2023-10-31 PROCEDURE — A9270 NON-COVERED ITEM OR SERVICE: HCPCS | Performed by: INTERNAL MEDICINE

## 2023-10-31 PROCEDURE — A9270 NON-COVERED ITEM OR SERVICE: HCPCS

## 2023-10-31 PROCEDURE — 85025 COMPLETE CBC W/AUTO DIFF WBC: CPT

## 2023-10-31 PROCEDURE — 700111 HCHG RX REV CODE 636 W/ 250 OVERRIDE (IP): Performed by: INTERNAL MEDICINE

## 2023-10-31 PROCEDURE — A9270 NON-COVERED ITEM OR SERVICE: HCPCS | Performed by: STUDENT IN AN ORGANIZED HEALTH CARE EDUCATION/TRAINING PROGRAM

## 2023-10-31 PROCEDURE — 700102 HCHG RX REV CODE 250 W/ 637 OVERRIDE(OP): Performed by: INTERNAL MEDICINE

## 2023-10-31 PROCEDURE — C9113 INJ PANTOPRAZOLE SODIUM, VIA: HCPCS | Performed by: INTERNAL MEDICINE

## 2023-10-31 PROCEDURE — A9270 NON-COVERED ITEM OR SERVICE: HCPCS | Mod: JZ | Performed by: NURSE PRACTITIONER

## 2023-10-31 PROCEDURE — 700102 HCHG RX REV CODE 250 W/ 637 OVERRIDE(OP): Performed by: STUDENT IN AN ORGANIZED HEALTH CARE EDUCATION/TRAINING PROGRAM

## 2023-10-31 PROCEDURE — 99233 SBSQ HOSP IP/OBS HIGH 50: CPT | Performed by: STUDENT IN AN ORGANIZED HEALTH CARE EDUCATION/TRAINING PROGRAM

## 2023-10-31 PROCEDURE — 92526 ORAL FUNCTION THERAPY: CPT

## 2023-10-31 PROCEDURE — 85027 COMPLETE CBC AUTOMATED: CPT

## 2023-10-31 PROCEDURE — 97535 SELF CARE MNGMENT TRAINING: CPT | Mod: CO

## 2023-10-31 PROCEDURE — 770020 HCHG ROOM/CARE - TELE (206)

## 2023-10-31 PROCEDURE — 99232 SBSQ HOSP IP/OBS MODERATE 35: CPT | Mod: FS | Performed by: INTERNAL MEDICINE

## 2023-10-31 PROCEDURE — 700102 HCHG RX REV CODE 250 W/ 637 OVERRIDE(OP)

## 2023-10-31 PROCEDURE — 80048 BASIC METABOLIC PNL TOTAL CA: CPT

## 2023-10-31 PROCEDURE — 700111 HCHG RX REV CODE 636 W/ 250 OVERRIDE (IP): Mod: JZ

## 2023-10-31 PROCEDURE — 36415 COLL VENOUS BLD VENIPUNCTURE: CPT

## 2023-10-31 PROCEDURE — 74230 X-RAY XM SWLNG FUNCJ C+: CPT

## 2023-10-31 PROCEDURE — 82962 GLUCOSE BLOOD TEST: CPT

## 2023-10-31 RX ORDER — FUROSEMIDE 10 MG/ML
20 INJECTION INTRAMUSCULAR; INTRAVENOUS
Status: DISCONTINUED | OUTPATIENT
Start: 2023-11-01 | End: 2023-11-01

## 2023-10-31 RX ORDER — POTASSIUM CHLORIDE 20 MEQ/1
40 TABLET, EXTENDED RELEASE ORAL 2 TIMES DAILY
Status: DISCONTINUED | OUTPATIENT
Start: 2023-10-31 | End: 2023-11-01

## 2023-10-31 RX ORDER — SIMETHICONE 125 MG
125 TABLET,CHEWABLE ORAL 3 TIMES DAILY PRN
Status: DISCONTINUED | OUTPATIENT
Start: 2023-10-31 | End: 2023-11-10 | Stop reason: HOSPADM

## 2023-10-31 RX ADMIN — PANTOPRAZOLE SODIUM 40 MG: 40 INJECTION, POWDER, FOR SOLUTION INTRAVENOUS at 17:57

## 2023-10-31 RX ADMIN — DOCUSATE SODIUM 50 MG AND SENNOSIDES 8.6 MG 2 TABLET: 8.6; 5 TABLET, FILM COATED ORAL at 17:57

## 2023-10-31 RX ADMIN — FUROSEMIDE 40 MG: 10 INJECTION, SOLUTION INTRAVENOUS at 04:47

## 2023-10-31 RX ADMIN — POLYETHYLENE GLYCOL 3350 1 PACKET: 17 POWDER, FOR SOLUTION ORAL at 18:19

## 2023-10-31 RX ADMIN — SIMETHICONE 125 MG: 125 TABLET, CHEWABLE ORAL at 22:06

## 2023-10-31 RX ADMIN — Medication 10 MG: at 20:40

## 2023-10-31 RX ADMIN — ROTIGOTINE 4 MG: 4 PATCH, EXTENDED RELEASE TRANSDERMAL at 17:58

## 2023-10-31 RX ADMIN — CEFTRIAXONE SODIUM 2000 MG: 10 INJECTION, POWDER, FOR SOLUTION INTRAVENOUS at 04:45

## 2023-10-31 RX ADMIN — LEVOTHYROXINE, LIOTHYRONINE 60 MG: 19; 4.5 TABLET ORAL at 04:56

## 2023-10-31 RX ADMIN — DOXYCYCLINE 100 MG: 100 INJECTION, POWDER, LYOPHILIZED, FOR SOLUTION INTRAVENOUS at 04:55

## 2023-10-31 RX ADMIN — PANTOPRAZOLE SODIUM 40 MG: 40 INJECTION, POWDER, FOR SOLUTION INTRAVENOUS at 04:47

## 2023-10-31 RX ADMIN — POTASSIUM CHLORIDE 40 MEQ: 1500 TABLET, EXTENDED RELEASE ORAL at 08:23

## 2023-10-31 RX ADMIN — DOCUSATE SODIUM 50 MG AND SENNOSIDES 8.6 MG 2 TABLET: 8.6; 5 TABLET, FILM COATED ORAL at 04:45

## 2023-10-31 RX ADMIN — FINASTERIDE 5 MG: 5 TABLET, FILM COATED ORAL at 04:47

## 2023-10-31 RX ADMIN — HYDROCORTISONE SODIUM SUCCINATE 50 MG: 100 INJECTION, POWDER, FOR SOLUTION INTRAMUSCULAR; INTRAVENOUS at 04:46

## 2023-10-31 RX ADMIN — TAMSULOSIN HYDROCHLORIDE 0.4 MG: 0.4 CAPSULE ORAL at 08:23

## 2023-10-31 RX ADMIN — POTASSIUM CHLORIDE 40 MEQ: 1500 TABLET, EXTENDED RELEASE ORAL at 17:57

## 2023-10-31 ASSESSMENT — COGNITIVE AND FUNCTIONAL STATUS - GENERAL
TOILETING: A LOT
DRESSING REGULAR UPPER BODY CLOTHING: A LITTLE
SUGGESTED CMS G CODE MODIFIER DAILY ACTIVITY: CK
DRESSING REGULAR LOWER BODY CLOTHING: A LOT
DAILY ACTIVITIY SCORE: 14
EATING MEALS: A LOT
PERSONAL GROOMING: A LITTLE
HELP NEEDED FOR BATHING: A LOT

## 2023-10-31 ASSESSMENT — ENCOUNTER SYMPTOMS
BLOOD IN STOOL: 0
CONSTIPATION: 0
FEVER: 0
DIZZINESS: 0
MYALGIAS: 1
VOMITING: 0
ABDOMINAL DISTENTION: 0
SHORTNESS OF BREATH: 0
BACK PAIN: 0
BRUISES/BLEEDS EASILY: 0
WOUND: 0
WHEEZING: 0
PALPITATIONS: 0
SINUS PAIN: 0
CHILLS: 0
DIAPHORESIS: 0
STRIDOR: 0
FALLS: 1
HEADACHES: 0
DIARRHEA: 0
LIGHT-HEADEDNESS: 0
ABDOMINAL PAIN: 0
COUGH: 0
CHEST TIGHTNESS: 0
NAUSEA: 0
PSYCHIATRIC NEGATIVE: 1

## 2023-10-31 ASSESSMENT — PAIN DESCRIPTION - PAIN TYPE
TYPE: ACUTE PAIN
TYPE: ACUTE PAIN

## 2023-10-31 ASSESSMENT — FIBROSIS 4 INDEX: FIB4 SCORE: 2.21

## 2023-10-31 NOTE — THERAPY
"Occupational Therapy  Daily Treatment     Patient Name: Elias Casanova  Age:  82 y.o., Sex:  male  Medical Record #: [id number]  Today's Date: 10/31/2023     Precautions  Precautions: Fall Risk, Swallow Precautions  Comments: hx PD    Assessment    Pt was seen for OT treatment. Attempted X3. First attempt pt eating breakfast and requested OT come back later. Second attempt pt just leaving with SLP for treatment MBS. Pt not available. Third attempt pt on cell phone and did agree to attempt simple self care tasks \"in 5 mins\". Pt attempted UB dressing changes with Min A and extended time to process MP and sequence task Pt able to doff socks using tailor tech with Min A and extended time. Pt unable to don stretchy undergarment and socks using tailor tech confused at times with sequencing and problem solving. Also socks did get stuck on long toenails needing assist. Max A for LB dressing and Total A for clothing management up over hips in standing using FWW for support. Pt demos  posterior leaning with standing activity nearly falling back into chair. Pt unable to stand fully erect this OT session. H/G tasks done seated base post set up with Min A and extended time. Verbal cues for thoroughness Pt continues to be limited by decreased functional mobility, activity tolerance, strength, balance and at times slight pain with activity which are affecting pt's ability to complete ADL's, I ADL's and ADL transfers at baseline. RN updated on OT treatment findings and recommendations. Will continue to follow.       Plan    Treatment Plan Status: (P) Continue Current Treatment Plan  Type of Treatment: Self Care / Activities of Daily Living, Adaptive Equipment, Manual Therapy Techniques, Neuro Re-Education / Balance, Therapeutic Exercises, Therapeutic Activity  Treatment Frequency: 4 Times per Week  Treatment Duration: Until Therapy Goals Met    DC Equipment Recommendations: (P) Unable to determine at this time  Discharge " "Recommendations: (P) Recommend post-acute placement for additional occupational therapy services prior to discharge home    Subjective  \"I've done a lot today and I am tired but I'll try\".      Objective       10/31/23 1228   Cognition    Cognition / Consciousness X   Speech/ Communication Delayed Responses   Level of Consciousness Alert   Ability To Follow Commands 1 Step   Safety Awareness Impaired   New Learning Impaired   Comments continues to be difficult to understand at times. Pleasant and motivated in therapy.   Passive ROM Upper Body   Passive ROM Upper Body WDL   Comments WFL   Active ROM Upper Body   Active ROM Upper Body  X   Dominant Hand Right   Comments limited by weakness.   Strength Upper Body   Upper Body Strength  X   Gross Strength Generalized Weakness, Equal Bilaterally.    Other Treatments   Other Treatments Provided Psychosocial intervention addressed. Educated pt in the importance of attempting simple self care tasks to increase strength, and general endurance needed ADL's and ADL transfers. .   Balance   Sitting Balance (Static) Fair   Sitting Balance (Dynamic) Fair -   Standing Balance (Static) Poor   Standing Balance (Dynamic) Poor   Weight Shift Sitting Poor   Weight Shift Standing Fair   Skilled Intervention Verbal Cuing;Tactile Cuing;Sequencing   Comments with FWW for standing   Bed Mobility    Comments NT Pt up in chair prior to and post OT session.   Activities of Daily Living   Grooming Minimal Assist;Seated   Bathing   (declined to attempt)   Upper Body Dressing Minimal Assist   Lower Body Dressing Maximal Assist   Toileting Total Assist   Skilled Intervention Verbal Cuing;Tactile Cuing;Sequencing;Postural Facilitation;Compensatory Strategies   Comments Pt will need assist for most self care tasks at this time and all I ADL's and ADL transfers.   Functional Mobility   Sit to Stand Moderate Assist   Bed, Chair, Wheelchair Transfer Moderate Assist   Transfer Method Stand Step "   Mobility STS X3   Comments with FWW and cues for hand placements.   Activity Tolerance   Comments fatigued with activity and STS. Low activity tolerance.   Patient / Family Goals   Patient / Family Goal #1 to get better   Goal #1 Outcome Progressing slower than expected   Short Term Goals   Short Term Goal # 1 pt will compelete grooming standing at sink w/spv   Goal Outcome # 1 Goal not met   Short Term Goal # 2 pt will complete LB dressing w/spv   Goal Outcome # 2 Progressing slower than expected   Short Term Goal # 3 pt will toilet txf w/spv   Goal Outcome # 3 Progressing slower than expected   Occupational Therapy Treatment Plan    O.T. Treatment Plan Continue Current Treatment Plan   Anticipated Discharge Equipment and Recommendations   DC Equipment Recommendations Unable to determine at this time   Discharge Recommendations Recommend post-acute placement for additional occupational therapy services prior to discharge home   Interdisciplinary Plan of Care Collaboration   IDT Collaboration with  Nursing;Certified Nursing Assistant   Collaboration Comments RN updated

## 2023-10-31 NOTE — DISCHARGE PLANNING
LMSW spoke to pt's sister regarding [facility] denying pt. LMSW informed pt's sister about accepting SNFs. LMSW to f/u with pt; sister asked LMSW to call her during meeting with pt.    [id number] LMSW met with pt and spoke with pt's sister over the phone. LMSW informed pt that he was not accepted by [facility]; presented pt with choice form for SNF. Pt chose [facility]. Pt's sister aware. LMSW answered pt and pt's sister's questions. LMSW faxed choice form to [facility].     Discussed pt during IDT rounds. Per Geoffrey MEMBRENO, pt has a palliative consult. Pt is refusing hospice. LMSW to f/u.    [facility] [phone]    Discussed pt during afternoon rounds. Pt will not be medically cleared today, not anticipated to be medically cleared tomorrow.

## 2023-10-31 NOTE — PROGRESS NOTES
Assumed care on patient, alert and oriented x 3, forgetful but easily reoriented. Respiration even and unlabored, on RA. Denied pain. Tele on and heart rhythm and rate checked on monitor.    Safety precautions are nonskid socks on, side rails up x 2, bed to lowest level, bed alarm on, instructed to use call light for assistance, he verbalized understanding.     Plan of care : monitor v/s especially BP, check lab values, q 2 turn for pressure ulcer prevention, desir care and I and O. Will round hourly and as needed.

## 2023-10-31 NOTE — THERAPY
"Speech Language Pathology   Clinical Swallow Evaluation     Patient Name: Elias Casanova  AGE:  82 y.o., SEX:  male  Medical Record #: [id number]  Date of Service: 10/30/2023      History of Present Illness  81 yo male admitted 10/26 following GLF and rhabdomyolysis. Workup revealed NSTMI. Hospital stay complicated by anemia.     No hx SLP in [facility].     CMHx: NSTEMI, Parkinson disease, afib, rhabdomyolysis, anemia  PMHx: Pulmonary HTN, adrenal insufficiency    CXR 10/29:  \"1.  Hypoinflation with elevated LEFT hemidiaphragm and LEFT lung base atelectasis or pneumonia.  2.  Probable mild edema.  3.  No pneumothorax.\"    General Information:  Vitals  O2 Delivery Device: None - Room Air  Level of Consciousness: Alert, Awake  Patient Behaviors: Forgetful  Orientation: Oriented x 4  Follows Directives: Yes      Prior Living Situation & Level of Function:  Prior Services: None  Lives with - Patient's Self Care Capacity: Friends  Communication: Over the last few months, reports increased difficulty with breath support for speech  Swallowing: Reports RG/TN at baseline with intermittent heartburn and globus. States \"It gets stuck and I have to wash it down.\" Reports this happens ~once a week.       Oral Mechanism Evaluation:  Dentition: Poor, Scattered dentition, Denture(s) not available at time of evaluation (\"I'm missing twenty teeth.\")   Facial Symmetry: Equal  Facial Sensation: Not tested     Labial Observations: Open mouth posture   Lingual Observations: Midline  Motor Speech: Fair, 100% intelligible at conversational level  Comments: Poor breath support for speech/phrasing         Laryngeal Function:  Secretion Management: Adequate  Voice Quality: Strain, Breathy continuous  Cough: Perceptually weak         Subjective  Pt agreeable and cooperative with SLP evaluation tasks. \"The problem with my swallow is that I can't get down the last few bites; I have to work on them for a really long time.\"       Assessment  Current " "Method of Nutrition: Oral diet (RG/TN)  Positioning: Pinzon's (60-90 degrees)  Bolus Administration: Patient    O2 Delivery Device: None - Room Air  Factor(s) Affecting Performance: None  Tracheostomy : No        Swallowing Trials:  Thin Liquid (TN0): WFL  Regular (RG7): Impaired      Comments:   Pt was mashing pieces of apple upon SLP arrival despite lunch tray no longer being in room. Pt had marked difficulty clearing these pieces despite cues/strategies. Liquid wash was only effective after multiple tries. Apple pieces were sufficiently masticated but pt was unable to achieve AP transit. When accomplished (with multiple liquid washes), it was perceptually effortful. Pt w/ appropriate self-feeding. No increase in WOB, no cough/clear, no change in vocal quality. One to two swallows appreciated per bolus.     Discussed IDDSI levels with pt who does not want diet modification at this time. Does wish to proceed with MBSS, however, given globus, oral dysphagia, and reports of \"when I eat those granola bars with the little pieces I aspirate a lot.\"        Clinical Impressions  Given pt report of dysphagia symptoms, including globus and aspiration, and demonstration of oral phase dysphagia with concern for PNA and mobility below his baseline, recommend MBSS to further evaluate swallow function. Would consider diet downgrade to softer, easier to chew solid (SB6 vs. MM5); however, pt is not amenable to diet modification at this time.       Recommendations  Regular solids with thin liquids  Instrumentation: VFSS (MBSS)  Medication: As tolerated  Supervision: Distant supervision - check on patient 2-3 times per meal  Positioning: Fully upright and midline during oral intake  Risk Management : Small bites/sips, Slow rate of intake, Physical mobility, as tolerated  Oral Care:  (At least TID after meals)         SLP Treatment Plan  Treatment Plan: Dysphagia Treatment, Patient/Family/Caregiver Training  SLP Frequency: 3x Per " "Week  Estimated Duration: Until Therapy Goals Met      Anticipated Discharge Needs  Discharge Recommendations: Recommend post-acute placement for additional speech therapy services prior to discharge home (changes pending dx evaluation)            Patient / Family Goals  Patient / Family Goal #1: \"It gets stuck sometimes.\"  Short Term Goals  Short Term Goal # 1: Pt will complete MBSS w SLP to further evaluate swallow function and inform POC.      Sasha Watts, SLP   "

## 2023-10-31 NOTE — THERAPY
Speech Language Pathology   Videofluoroscopic Swallow Study Evaluation     Patient Name: Elias Casanova  AGE:  82 y.o., SEX:  male  Medical Record #: [id number]  Date of Service: 10/31/2023      History of Present Illness  81 yo male admitted 10/26 following GLF and rhabdomyolysis. Workup revealed NSTMI. Hospital stay complicated by anemia.       Pertinent Information  Current Method of Nutrition: Oral diet (RG/TN)  Patient Behaviors:  Cooperative, pleasant  Dentition: Poor, Scattered dentition, Denture(s) not available at time of evaluation  Secretion Management: Adequate  Feeding Tube: None  Tracheostomy: No   Factor(s) Affecting Performance: Impaired endurance      Discussed the risks, benefits, and alternatives of the VFSS procedure. Patient/family acknowledged and agreed to proceed.      Assessment  Videofluoroscopic Swallow Study was conducted in the Lateral projection(s) to evaluate oropharyngeal swallow function; pt kyphosis and limited independence with mobility restricted ability to complete AP screen. A radiology tech was present to assist with the procedure.      Positioning: Seated upright in fluoroscopy chair  Anatomic View: Significant kyphosis, concern for osteophyte against posterior trachea  Bolus Administration: SLP, Patient  PO Barium Contrast Trials: Varibar thin, Varibar nectar (mildly thick), Liquidised mixed with Varibar powder, Varibar pudding, Soft & Bite sized coated with Varibar powder, Regular solid coated with Varibar pudding, Mixed consistency with Varibar powder      Consistency PAS Score Timing Residue Comments   Thin Liquid 8 During swallow, Post swallow Vallecular Residue: Severe (>50%)  Pyriform Sinus Residue: Mild (5%-25%) Tsp - PAS 2  Cup - PAS 1 x2; PAS 5 after  Straw - PAS 1 x4, PAS 8; PAS 7, PAS 8 after   Mildly Thick 8 During Swallow, Post Swallow Vallecular Residue: Severe (>50%)  Pyriform Sinus Residue: Mild (5%-25%) Cup - PAS 1, PAS 8 x2  Straw - PAS 2, PAS 3 x5; PAS 5,  PAS 8 after   Liquidised 1 N/A Vallecular Residue: Severe (>50%)  Pyriform Sinus Residue: Mild (5%-25%)    Pudding 1 N/A Vallecular Residue: Mild (5%-25%)  Pyriform Sinus Residue: Trace (1%-5%)    Soft & Bite Sized 1 N/A Vallecular Residue: Severe (>50%)  Pyriform Sinus Residue: Moderate (25%-50%)    Regular Solid 1 N/A Vallecular Residue: Severe (>50%)  Pyriform Sinus Residue: Mild (5%-25%)    Mixed 8 Post Swallow Vallecular Residue: Severe (>50%)  Pyriform Sinus Residue: Moderate (25%-50%) PAS 5 after falling to PAS 8 during cleansing swallow     Penetration-Aspiration Scale (PAS)  1     No contrast enters airway  2     Contrast enters the airway, remains above the vocal folds, and is ejected from the airway (not seen in the airway at the end of the swallow).  3     Contrast enters the airway, remains above the vocal folds, and is not ejected from the airway (is seen in the airway after the swallow).  4     Contrast enters the airway, contacts the vocal folds, and is ejected from the airway.  5     Contrast enters the airway, contacts the vocal folds, and is not ejected from the airway  6     Contrast enters the airway, crosses the plane of the vocal folds, and is ejected from the airway.  7     Contrast enters the airway, crosses the plane of the vocal folds, and is not ejected from the airway despite effort.  8     Contrast enters the airway, crosses the plane of the vocal folds, is not ejected from the airway and there is no response to aspiration.       Oral phase:  Lingual rocking noted throughout, worse with liquids. Overall functional and timely mastication of soft and regular solids given small bites. Containment WFL.       Pharyngeal phase:  Pharyngeal phase characterized by impairments in pharyngeal sensation, cough strength, hyolaryngeal movement, LVC, BOT retaction, and pharyngeal constriction/shortening which resulted in the following:  - Aspiration of TN0 and MT2 during and after the swallow, as well  as aspiration of mixed consistency after the swallow during cleansing swallows. Pt was sensate in single instance with TN0; aspiration was otherwise silent. Aspirate did not clear from the airway using cued cough. More consistent and greater volume aspiration with MT2.   - Deep penetration (to the vocal folds) with TN0, MT2, and mixed after the swallow. Penetrate did not clear from the airway and would almost always be ultimately aspirated  - Consistent shallow penetration with MT2 that would remain in the airway  - Severe vallecular residue with all but PU4 that did not clear spontaneously and could only be reduced with strategy use  - Mild to moderate pyriform sinus residue with all but PU4 that did not clear spontaneously and could only be reduced with strategy use  -  Mild to trace PPW residue throughout  - Pt was generally not sensate to airway invasion  - Pt was not sensate to residue the vallecula but did appear sensate to pyriform sinus residue, as he would complete spontaneous cleansing swallow when bolus residue fell into pyriforms from vallecula after the swallow      Esophageal Phase:  AP esophageal screen was not completed given kyphosis and instability with mobility. Trace retention noted in limited view of upper esophagus; no observed retrograde flow.        Compensatory Strategies:  Chin tuck - INEFFECTIVE to reduce residue  Effortful swallow - INEFFECTIVE to reduce residue  3-sec prep - INEFFECTIVE to reduce airway invasion  Liquid wash - INEFFECTIVE to reduce residue; would generally worsen residue  Cough - GENERALLY INEFFECTIVE to clear airway invasion    Cleansing swallow - SOMEWHAT EFFECTIVE to reduce but not clear residue      Severity Rating:   PORTER: Severe      Clinical Impressions  The pt presents with a severe oropharyngeal dysphagia, likely acute on chronic related to acute debility and NSTEMI in the setting of Parkinson's. Swallow safety and pharyngeal efficiency are impaired. Following  "discussions with patient and MD about goals/POC, would recommend continuation of RG/TN diet despite known risk at this time with use of strategies to maximize pulmonary hygiene including diligent oral care and mobility as pt is able. Pt appears to be a good candidate for ongoing behavorial and exercise-based swallow rehabilitation; however, prognosis is fair considering likely chronic component of dysphagia. SLP to follow for education, strategy training, and training of evidence-based exercises targeting biomechanical deficits noted on MBSS.         Recommendations  Regular solids with thin liquids despite known risk   Discussed with patient and MD who are in agreement with this POC  Medication: Crush with applesauce, as appropriate, Crush with pudding/puree, as appropriate  Supervision: Distant supervision - check on patient 2-3 times per meal  Positioning: Fully upright and midline during oral intake, Meals sitting upright in a chair, as tolerated  Strategies: Small bites/sips, Slow rate of intake, Multiple swallows (2-3) per bite/sips, Physical mobility as tolerated and diligent oral care, Periodic cough okay  Oral Care: At least TID after meals  Additional Instrumentation: None         SLP Treatment Plan  Treatment Plan: Dysphagia Treatment, Patient/Family/Caregiver Training  SLP Frequency: 3x Per Week  Estimated Duration: Until Therapy Goals Met      Anticipated Discharge Needs  Discharge Recommendations: Recommend post-acute placement for additional speech therapy services prior to discharge home   Therapy Recommendations Upon DC: Dysphagia Training, Community Re-Integration, Patient / Family / Caregiver Education        Patient / Family Goals  Patient / Family Goal #1: \"It gets stuck sometimes.\"  Goal #1 Outcome: Progressing as expected  Short Term Goal # 1: Pt will complete MBSS w SLP to further evaluate swallow function and inform POC.  Goal Outcome # 1: Goal met, new goal added  Short Term Goal # 1 B : Pt " will consume diet of RG/TN given strategies to minimize risk and maximize pulmonary hygiene.  Short Term Goal # 2: Pt will complete exercises targeting hyolaryngeal movement, LVC, BOT retaction, and pharyngeal constriction/shortening x40 in a session given mod cues from SLP.      Sasha Watts, SLP

## 2023-10-31 NOTE — THERAPY
"Speech Language Pathology   Daily Treatment     Patient Name: Elias Casanova  AGE:  82 y.o., SEX:  male  Medical Record #: [id number]  Date of Service: 10/31/2023      Precautions:  Precautions: Fall Risk, Non Weight Bearing Right Upper Extremity, Swallow Precautions         Subjective  Pt agreeable and cooperative with SLP tx tasks. Somewhat flat. Limited learning evidence to risks seen on MBSS; stated \"Well I have a partial plate that I purposely didn't bring that has 15 teeth and helps me chew things to smaller pieces.\"      Assessment  Pt seen for dysphagia management following MBSS. Seated [facility]. Education provided on results of MBSS including silent aspiration of thin and thick and severe vallecular residue across textures. Discussed with pt strategies to maximize swallow outcomes including the following: thorough, frequent oral care; mobility as the patient is able; upright positioning for meals; independence as able with feeding and oral care; and adherence to swallow strategies as posted, especially double swallow. Limited learning evidence, will need reinforcement.     Pt then complete exercises targeting deficits noted on MBSS. Pt completed effortful swallow x25, CTAR x10, and attempted BOT retraction exercises x5 (unable to complete). Demonstrated good effortful and fair to good accuracy.      Clinical Impressions  The pt presents with a severe oropharyngeal dysphagia per MBSS.  Following discussions with patient and MD about goals/POC, would recommend continuation of RG/TN diet despite known risk at this time with use of strategies to maximize pulmonary hygiene including diligent oral care and mobility as pt is able. Pt appears to be a good candidate for ongoing behavorial and exercise-based swallow rehabilitation; however, prognosis is fair considering likely chronic component of dysphagia. SLP to follow for education, strategy training, and training of evidence-based exercises targeting biomechanical " "deficits noted on MBSS.       Recommendations   Regular solids with thin liquids despite known risk   Discussed with patient and MD who are in agreement with this POC  Medication: Crush with applesauce, as appropriate, Crush with pudding/puree, as appropriate  Supervision: Distant supervision - check on patient 2-3 times per meal  Positioning: Fully upright and midline during oral intake, Meals sitting upright in a chair, as tolerated  Strategies: Small bites/sips, Slow rate of intake, Multiple swallows (2-3) per bite/sips, Physical mobility as tolerated and diligent oral care, Periodic cough okay  Oral Care: At least TID after meals  Additional Instrumentation: None      SLP Treatment Plan  Treatment Plan: Dysphagia Treatment, Patient/Family/Caregiver Training  SLP Frequency: 4x Per Week  Estimated Duration: Until Therapy Goals Met      Anticipated Discharge Needs  Discharge Recommendations: Recommend post-acute placement for additional speech therapy services prior to discharge home  Therapy Recommendations Upon DC: Dysphagia Training, Patient / Family / Caregiver Education, Community Re-Integration      Patient / Family Goals  Patient / Family Goal #1: \"It gets stuck sometimes.\"  Goal #1 Outcome: Progressing as expected  Short Term Goals  Short Term Goal # 1: Pt will complete MBSS w SLP to further evaluate swallow function and inform POC.  Goal Outcome # 1: Goal met, new goal added  Short Term Goal # 1 B : Pt will consume diet of RG/TN given strategies to minimize risk and maximize pulmonary hygiene.  Goal Outcome  # 1 B: Progressing as expected  Short Term Goal # 2: Pt will complete exercises targeting hyolaryngeal movement, LVC, BOT retaction, and pharyngeal constriction/shortening x40 in a session given mod cues from SLP.  Goal Outcome # 2 : Progressing as expected      Sasha Watts, SLP  "

## 2023-10-31 NOTE — CARE PLAN
The patient is Stable - Low risk of patient condition declining or worsening    Shift Goals  Clinical Goals: vss, maintain safety  Patient Goals: rest  Family Goals: updates    Progress made toward(s) clinical / shift goals:    Problem: Knowledge Deficit - Standard  Goal: Patient and family/care givers will demonstrate understanding of plan of care, disease process/condition, diagnostic tests and medications  Description: Target End Date:  1-3 days or as soon as patient condition allows    Document in Patient Education    1.  Patient and family/caregiver oriented to unit, equipment, visitation policy and means for communicating concern  2.  Complete/review Learning Assessment  3.  Assess knowledge level of disease process/condition, treatment plan, diagnostic tests and medications  4.  Explain disease process/condition, treatment plan, diagnostic tests and medications  Outcome: Progressing     Problem: Skin Integrity  Goal: Skin integrity is maintained or improved  Description: Target End Date:  Prior to discharge or change in level of care    Document interventions on Skin Risk/Logan flowsheet groups and corresponding LDA    1.  Assess and monitor skin integrity, appearance and/or temperature  2.  Assess risk factors for impaired skin integrity and/or pressures ulcers  3.  Implement precautions to protect skin integrity in collaboration with interdisciplinary team  4.  Implement pressure ulcer prevention protocol if at risk for skin breakdown  5.  Confirm wound care consult if at risk for skin breakdown  6.  Ensure patient use of pressure relieving devices  (Low air loss bed, waffle overlay, heel protectors, ROHO cushion, etc)  Outcome: Progressing     Problem: Fall Risk  Goal: Patient will remain free from falls  Description: Target End Date:  Prior to discharge or change in level of care    Document interventions on the Rosalina Benton Fall Risk Assessment    1.  Assess for fall risk factors  2.  Implement fall  precautions  Outcome: Progressing     Problem: Mobility  Goal: Patient's capacity to carry out activities will improve  Description: Target End Date:  Prior to discharge or change in level of care    1.  Assess for barriers to mobility/activity  2.  Implement activity per interdisciplinary team recommendations  3.  Target activity level identified and patient/family/caregiver aware of goal  4.  Provide assistive devices  5.  Instruct patient/caregiver on proper use of assistive/adaptive devices  6.  Schedule activities and rest periods to decrease effects of fatigue  7.  Encourage mobilization to extent of ability  8.  Maintain proper body alignment  9.  Provide adequate pain management to allow progressive mobilization  10. Implement pace maker precautions as needed  Outcome: Progressing     Problem: Self Care  Goal: Patient will have the ability to perform ADLs independently or with assistance (bathe, groom, dress, toilet and feed)  Description: Target End Date:  Prior to discharge or change in level of care    Document on ADL flowsheet    1.  Assess the capability and level of deficiency to perform ADLs  2.  Encourage family/care giver involvement  3.  Provide assistive devices  4.  Consider PT/OT evaluations  5.  Maintain support, give positive feedback, encourage self-care allowing extra time and verbal cuing as needed  6.  Avoid doing something for patients they can do themselves, but provide assistance as needed  7.  Assist in anticipating/planning individual needs  8.  Collaborate with Case Management and  to meet discharge needs  Outcome: Progressing       Patient is not progressing towards the following goals:

## 2023-10-31 NOTE — CONSULTS
MRN: [id number]  Date of palliative consult: 10/31/2023  Reason for consult: ACP/GOC  Referring provider: Stefan Davis M.D.  Location of consult: Tele 7  Additional consulting services: cardiology, PM&R    HPI:   Elias Casanova is a 82 y.o. male who presented to the ED on 10/26/2023 as a transfer with rhabdomyolysis after being found down for 12 mrs. Pt was also found to have elevated troponin and was admitted for NSTEMI. ECHO with EF of 52% with akinesis of the distal septum and apex consistent with LAD territory ischemia/infarct. Pt was previously on heparin drip. Pt with up trending leukocytosis and CXR concerning for PNA and was started on ceftriaxone and doxycycline. Patient had a drop in hemoglobin on 10/30 with pending FOBT.     Significant/pertient past medical history: afib on coagulation, parkinson's, HTN, and pulmonary HTN    Additional symptoms: Denies dyspnea, nausea, vomiting, constipation, fatigue, sleep, mood, appetite    Medication Allergy/Sensitivities:  Allergies   Allergen Reactions    Novocain Unspecified     Eye swelling and redness       ROS:    Review of Systems   Respiratory:  Negative for shortness of breath.    Cardiovascular:  Negative for chest pain.   Gastrointestinal:  Negative for abdominal pain, constipation, diarrhea, nausea and vomiting.   Musculoskeletal:  Negative for back pain.       PE:   Recent vital signs  BMI: Body mass index is 26.21 kg/m².    Temp (24hrs), Av.7 °C (98 °F), Min:36.1 °C (97 °F), Max:37 °C (98.6 °F)  Temperature: 36.4 °C (97.6 °F), Monitored Temp: 37 °C (98.6 °F)  Pulse  Av.9  Min: 73  Max: 119   Blood Pressure : 99/65       Physical Exam  Constitutional:       Appearance: He is ill-appearing.   Neurological:      Mental Status: He is alert.   Psychiatric:         Behavior: Behavior is cooperative.       Recent Labs     10/29/23  0344 10/30/23  0559 10/31/23  0114   SODIUM 136 137 136   POTASSIUM 3.6 3.2* 3.0*   CHLORIDE 109 105 106   CO2 14* 19* 17*  "  GLUCOSE 122* 126* 98   BUN 46* 47* 43*   CREATININE 1.46* 1.64* 1.54*   CALCIUM 7.4* 8.5 7.7*     Recent Labs     10/29/23  0639 10/29/23  1440 10/30/23  0559 10/30/23  1257 10/30/23  1925 10/31/23  0114   WBC 12.0*  --  13.5*  --   --  13.1*   RBC 2.35*  --  3.38*  --   --  2.80*   HEMOGLOBIN 6.5*   < > 9.4* 9.1* 10.4* 7.7*   HEMATOCRIT 21.4*   < > 30.8* 29.1* 34.0* 25.4*   MCV 91.1  --  91.1  --   --  90.7   MCH 27.7  --  27.8  --   --  27.5   MCHC 30.4*  --  30.5*  --   --  30.3*   RDW 52.7*  --  53.7*  --   --  54.4*   PLATELETCT 341  --  406  --   --  339   MPV 10.6  --  10.2  --   --  10.3    < > = values in this interval not displayed.       ASSESSMENT/PLAN WITH SHARED DECISION MAKING:   Medications reviewed. Labs Reviewed.     Pertinent imaging reviewed.    PHYSICAL ASPECTS OF CARE  Palliative Performance Scale: 60%    # NSTEMI  # Anemia  # Parkinson's disease  # Non-traumatic rhabdomyolysis    # Persistent atrial fibrillation    SOCIAL ASPECTS OF CARE  Patient is a retired .  He lives in [place] with 2 roommates.  He describes himself as \"a hermit\" and notes that he will occasionally go weeks without seeing his roommates.  However, they are able to check on him as needed.  He ambulates with a walker and has previously been independent in all of his ADLs.  He relies on assistance from friend/family for IADLs.    SPIRITUAL ASPECTS OF CARE   Deferred during this visit.    GOALS OF CARE/SERIOUS ILLNESS CONVERSATION  Consult received and EMR reviewed. PC APRN met with patient at bedside.  Introduced self and role of palliative care.  Patient is alert and oriented x4 and is agreeable to consultation at this time.  Patient provided update on the social and medical history.     Assessed patient's understanding of current medical status, overall health picture, and options for future care. Demonstrates a good understanding of the current clinical picture.    Explored patient's expressed " "values, beliefs, and preferences in order to identify GOC. Patient states that he prefers to stay at home as long as possible as he has a beautiful view and lots of friends in the [place] area. However, he is agreeable to going to a \"nursing home\" if needed. He states that his current goals remain life prolongation; he states, \"I am not ready to go. I have one more fight in me. But I don't want advanced continuous care.\" He is agreeable to the continued care that he is receiving and states he is agreeable to return to the hospital in the future if needed. In fact, he states \"hopefully this is in my last trip to the hospital.\"    We did briefly discuss patient's Parkinson's disease and likely loss of safe swallowing in the future.  Patient states that his mother experienced severe dysphagia at the end of her life and \"went straight to hospice.\"  Patient felt that this was the right choice at this time.  Patient would be agreeable to temporary artificial nutrition but seems resistant to long-term artificial nutrition.  Discussed the option of hospice/comfort focused care in the future should clinical condition worsen and patient verbalized understanding.    Confirmed DNAR/DNI CODE STATUS.  Patient is adamant that he would not want intubated.  He understands that burden of resuscitative efforts would likely outweigh benefit.  Patient states that he has a POLST at home but is unsure if this is completed.  PC APRN offered to assist with POLST completion during this encounter.  However, he would like to defer and discuss with his primary care provider given that he is a [place] resident.  He declines making any updates to his advance directive at this time.    Active listening, reflection, reminiscing, validation & normalization, and empathic support utilized throughout this encounter.  All questions answered and contact information provided.   Outcome: Continue current level of care and DNAR/DNI " status.    Code Status: DNAR/DNI    ACP Documents: AD    I spent a total of 50 minutes reviewing medical records, direct face-to-face time with the patient and/or family, documentation and coordination of care. This is separate from the time spent on advance care planning, which is documented above.    GUNNER Estrada  Palliative Care Nurse Practitioner   [phone]

## 2023-10-31 NOTE — PROGRESS NOTES
Cardiology Follow Up Progress Note    Date of Service  10/31/2023    Attending Physician  Stefan Davis M.D.    Chief Complaint   Fall    HPI  Edward Casanova is a 82 y.o. male admitted 10/26/2023 with prior medical history of chronic Atrial Fibrillation (on Pradaxa), Parkinson's disease, and primary adrenal insufficiency (on prednisone) presented after a ground level fall, found down after 12 hours, and rhabdomyolysis. The work up revealed NSTEMI. Patient has been treated medically for NSTEMI. His stay is complicated by anemia requiring a blood transfusion.     Interim Events  10/31/2023: No overnight cardiac events. Tele monitoring personally interpreted by me shows Afib . VSS; RA; Daily weight 75.9 kg, bedscale; - 4.7 L UOP yesterday. Labs reviewed, notable for K 3.0, Ca 7.7, Serum Kappa/edgar free light chain positive. Occult blood positive. Continue medical management as tolerated. Consider Heme/onc consult for light chains if aligns with goals of care. Palliative to consult today.       Review of Systems  Review of Systems   Constitutional:  Negative for chills, diaphoresis and fever.   HENT: Negative.     Respiratory:  Negative for cough, chest tightness, shortness of breath (improved per patient), wheezing and stridor.    Cardiovascular:  Positive for leg swelling. Negative for chest pain and palpitations.   Gastrointestinal:  Negative for abdominal distention, abdominal pain, blood in stool, diarrhea, nausea and vomiting.   Genitourinary:  Negative for hematuria.   Skin:  Negative for rash and wound.   Neurological:  Negative for dizziness, syncope, light-headedness and headaches.   Hematological:  Does not bruise/bleed easily.   Psychiatric/Behavioral: Negative.         Vital signs in last 24 hours  Temp:  [36.1 °C (97 °F)-37 °C (98.6 °F)] 36.1 °C (97 °F)  Pulse:  [] 82  Resp:  [19-20] 19  BP: ()/() 96/62  SpO2:  [0 %-99 %] 99 %    Physical Exam  Physical Exam  Constitutional:        Appearance: Normal appearance.   HENT:      Head: Normocephalic and atraumatic.      Mouth/Throat:      Mouth: Mucous membranes are moist.   Cardiovascular:      Rate and Rhythm: Rhythm irregular.      Pulses: Normal pulses.      Heart sounds: No murmur heard.     No friction rub. No gallop.   Pulmonary:      Effort: Pulmonary effort is normal.      Breath sounds: No rales.   Abdominal:      General: Bowel sounds are normal. There is no distension.   Musculoskeletal:      Right lower leg: Edema (2+) present.      Left lower leg: Edema (2+) present.   Skin:     Coloration: Skin is pale.      Findings: No bruising.   Neurological:      Mental Status: He is oriented to person, place, and time.   Psychiatric:         Thought Content: Thought content normal.         Judgment: Judgment normal.         Lab Review  Lab Results   Component Value Date/Time    WBC 13.1 (H) 10/31/2023 01:14 AM    RBC 2.80 (L) 10/31/2023 01:14 AM    HEMOGLOBIN 7.7 (L) 10/31/2023 01:14 AM    HEMATOCRIT 25.4 (L) 10/31/2023 01:14 AM    MCV 90.7 10/31/2023 01:14 AM    MCH 27.5 10/31/2023 01:14 AM    MCHC 30.3 (L) 10/31/2023 01:14 AM    MPV 10.3 10/31/2023 01:14 AM      Lab Results   Component Value Date/Time    SODIUM 136 10/31/2023 01:14 AM    POTASSIUM 3.0 (L) 10/31/2023 01:14 AM    CHLORIDE 106 10/31/2023 01:14 AM    CO2 17 (L) 10/31/2023 01:14 AM    GLUCOSE 98 10/31/2023 01:14 AM    BUN 43 (H) 10/31/2023 01:14 AM    CREATININE 1.54 (H) 10/31/2023 01:14 AM      Lab Results   Component Value Date/Time    ASTSGOT 223 (H) 10/30/2023 05:59 AM    ALTSGPT 565 (H) 10/30/2023 05:59 AM     Lab Results   Component Value Date/Time    CHOLSTRLTOT 95 (L) 10/27/2023 12:04 AM    LDL 59 10/27/2023 12:04 AM    HDL 32 (A) 10/27/2023 12:04 AM    TRIGLYCERIDE 20 10/27/2023 12:04 AM    TROPONINT 1623 (H) 10/29/2023 03:44 AM       Recent Labs     10/28/23  2139   NTPROBNP [id number]*         Cardiac Imaging and Procedures Review  EKG:  10/28/2023 is Atrial Fibrillation,  rate 108    Echocardiogram:  10/29/2023  CONCLUSIONS  Low normal ventricular systolic function.  The ejection fraction is measured to be 52% by Carrion's biplane.  There is akinesis of the mid to distal septum and apex consistent with   LAD territory ischemia/infarct.  Diastolic function is difficult to assess with arrhythmia.  Normal right ventricular size with borderline reduced systolic   function.  Unable to estimate pulmonary artery pressure due to an inadequate   visualization of the inferior vena cava.  Severe biatrial dilation.  There appears to be a prominent Chiari network, however, would consider   a transesophageal echocardiogram if there was a strong concern for   endocarditis.  Eccentric, probably mild tricuspid regurgitation.  The inferior vena cava is not well visualized.    Imaging  Chest X-Ray:  10/28/2023  IMPRESSION:  1.  Hypoinflation with elevated LEFT hemidiaphragm and LEFT lung base atelectasis or pneumonia.  2.  Probable mild edema.  3.  No pneumothorax.     Assessment/Plan  NSTEMI   - Non-ST elevation MI with troponin levels peaked at 1,724 on 10/28/2023. Trending down currently to 1,623 (10/30/2023).   - Patient denies chest pain, states he never had it.   - Medical management is elected by patient and family. Patient declined cardiac catheterization.  - Echo shows akinesis of the mid to distal septum and apex consistent with   LAD territory ischemia/infarct.  -  Hold off on antiplatelets at this time. Await GI recommendations  - patient has been hypotensive and not been able to tolerate any beta-blockers or ARB, consider starting once blood pressure is improved  - Consider starting a atorvastatin once liver functions are stable    Chronic Atrial Fibrillation   - rate controlled currently ('s). Unable to tolerate metoprolol due to hypotension currently. Restart when able.    - patient is on Pradaxa at home. In the setting of NSTEMI, Heparin gtt was started initially. It was then  stopped due to a drop of Hgb to 6.5. Improved after a unit of RBCs.    - recommend not restarting Pradaxa as the risk of bleeding currently outweighs the benefit.     Anemia; +K/L meme chains  -Occult blood positive  -GI to consult  -consider heme/onc consult if aligns with goals of care    Future Appointments   Date Time Provider Department Center   12/14/2023 12:45 PM ALEXYS Garcia. [facility] None       Please contact me with any questions.  Thank you for allowing us to participate in the care of this patient. Discussed with Dr. Hale    Please see Dr. Stuart attestation for further details and MDM.     I personally spent a total of 15 minutes which includes face-to-face time and non-face-to-face time spent on preparing to see the patient, reviewing hospital notes and tests, obtaining history from the patient, performing a medically appropriate exam, counseling and educating the patient, ordering medications/tests/procedures/referrals as clinically indicated, and documenting information in the electronic medical record.    QUINTON Wright.P.RLeoN, HF-CERT   [facility]  [phone]

## 2023-11-01 LAB
ALBUMIN SERPL BCP-MCNC: 2.5 G/DL (ref 3.2–4.9)
ALBUMIN/GLOB SERPL: 1.1 G/DL
ALP SERPL-CCNC: 105 U/L (ref 30–99)
ALT SERPL-CCNC: 597 U/L (ref 2–50)
ANION GAP SERPL CALC-SCNC: 14 MMOL/L (ref 7–16)
ANION GAP SERPL CALC-SCNC: 14 MMOL/L (ref 7–16)
AST SERPL-CCNC: 250 U/L (ref 12–45)
BASOPHILS # BLD AUTO: 0.1 % (ref 0–1.8)
BASOPHILS # BLD: 0.01 K/UL (ref 0–0.12)
BILIRUB SERPL-MCNC: 0.2 MG/DL (ref 0.1–1.5)
BUN SERPL-MCNC: 37 MG/DL (ref 8–22)
BUN SERPL-MCNC: 48 MG/DL (ref 8–22)
CALCIUM ALBUM COR SERPL-MCNC: 9.2 MG/DL (ref 8.5–10.5)
CALCIUM SERPL-MCNC: 8 MG/DL (ref 8.5–10.5)
CALCIUM SERPL-MCNC: 8.9 MG/DL (ref 8.5–10.5)
CHLORIDE SERPL-SCNC: 104 MMOL/L (ref 96–112)
CHLORIDE SERPL-SCNC: 105 MMOL/L (ref 96–112)
CO2 SERPL-SCNC: 19 MMOL/L (ref 20–33)
CO2 SERPL-SCNC: 19 MMOL/L (ref 20–33)
CREAT SERPL-MCNC: 1.23 MG/DL (ref 0.5–1.4)
CREAT SERPL-MCNC: 1.56 MG/DL (ref 0.5–1.4)
EKG IMPRESSION: NORMAL
EOSINOPHIL # BLD AUTO: 0.17 K/UL (ref 0–0.51)
EOSINOPHIL NFR BLD: 1.3 % (ref 0–6.9)
ERYTHROCYTE [DISTWIDTH] IN BLOOD BY AUTOMATED COUNT: 53.5 FL (ref 35.9–50)
GFR SERPLBLD CREATININE-BSD FMLA CKD-EPI: 44 ML/MIN/1.73 M 2
GFR SERPLBLD CREATININE-BSD FMLA CKD-EPI: 59 ML/MIN/1.73 M 2
GLOBULIN SER CALC-MCNC: 2.2 G/DL (ref 1.9–3.5)
GLUCOSE SERPL-MCNC: 107 MG/DL (ref 65–99)
GLUCOSE SERPL-MCNC: 80 MG/DL (ref 65–99)
HCT VFR BLD AUTO: 26.1 % (ref 42–52)
HCT VFR BLD AUTO: 26.2 % (ref 42–52)
HCT VFR BLD AUTO: 27.5 % (ref 42–52)
HGB BLD-MCNC: 8 G/DL (ref 14–18)
HGB BLD-MCNC: 8 G/DL (ref 14–18)
HGB BLD-MCNC: 8.3 G/DL (ref 14–18)
IMM GRANULOCYTES # BLD AUTO: 0.08 K/UL (ref 0–0.11)
IMM GRANULOCYTES NFR BLD AUTO: 0.6 % (ref 0–0.9)
LYMPHOCYTES # BLD AUTO: 0.77 K/UL (ref 1–4.8)
LYMPHOCYTES NFR BLD: 6 % (ref 22–41)
MAGNESIUM SERPL-MCNC: 1.7 MG/DL (ref 1.5–2.5)
MAGNESIUM SERPL-MCNC: 2.3 MG/DL (ref 1.5–2.5)
MCH RBC QN AUTO: 27.7 PG (ref 27–33)
MCHC RBC AUTO-ENTMCNC: 30.5 G/DL (ref 32.3–36.5)
MCV RBC AUTO: 90.7 FL (ref 81.4–97.8)
MONOCYTES # BLD AUTO: 1.03 K/UL (ref 0–0.85)
MONOCYTES NFR BLD AUTO: 8.1 % (ref 0–13.4)
NEUTROPHILS # BLD AUTO: 10.67 K/UL (ref 1.82–7.42)
NEUTROPHILS NFR BLD: 83.9 % (ref 44–72)
NRBC # BLD AUTO: 0.03 K/UL
NRBC BLD-RTO: 0.2 /100 WBC (ref 0–0.2)
PHOSPHATE SERPL-MCNC: 2.6 MG/DL (ref 2.5–4.5)
PLATELET # BLD AUTO: 355 K/UL (ref 164–446)
PMV BLD AUTO: 10.4 FL (ref 9–12.9)
POTASSIUM SERPL-SCNC: 3.3 MMOL/L (ref 3.6–5.5)
POTASSIUM SERPL-SCNC: 4.4 MMOL/L (ref 3.6–5.5)
PROT SERPL-MCNC: 4.7 G/DL (ref 6–8.2)
RBC # BLD AUTO: 2.89 M/UL (ref 4.7–6.1)
SODIUM SERPL-SCNC: 137 MMOL/L (ref 135–145)
SODIUM SERPL-SCNC: 138 MMOL/L (ref 135–145)
WBC # BLD AUTO: 12.7 K/UL (ref 4.8–10.8)

## 2023-11-01 PROCEDURE — 700105 HCHG RX REV CODE 258: Performed by: STUDENT IN AN ORGANIZED HEALTH CARE EDUCATION/TRAINING PROGRAM

## 2023-11-01 PROCEDURE — 700111 HCHG RX REV CODE 636 W/ 250 OVERRIDE (IP): Performed by: INTERNAL MEDICINE

## 2023-11-01 PROCEDURE — A9270 NON-COVERED ITEM OR SERVICE: HCPCS | Mod: JZ | Performed by: STUDENT IN AN ORGANIZED HEALTH CARE EDUCATION/TRAINING PROGRAM

## 2023-11-01 PROCEDURE — A9270 NON-COVERED ITEM OR SERVICE: HCPCS | Performed by: STUDENT IN AN ORGANIZED HEALTH CARE EDUCATION/TRAINING PROGRAM

## 2023-11-01 PROCEDURE — 770020 HCHG ROOM/CARE - TELE (206)

## 2023-11-01 PROCEDURE — 99233 SBSQ HOSP IP/OBS HIGH 50: CPT | Performed by: STUDENT IN AN ORGANIZED HEALTH CARE EDUCATION/TRAINING PROGRAM

## 2023-11-01 PROCEDURE — 700102 HCHG RX REV CODE 250 W/ 637 OVERRIDE(OP): Mod: JZ | Performed by: STUDENT IN AN ORGANIZED HEALTH CARE EDUCATION/TRAINING PROGRAM

## 2023-11-01 PROCEDURE — 85018 HEMOGLOBIN: CPT | Mod: 91

## 2023-11-01 PROCEDURE — 80048 BASIC METABOLIC PNL TOTAL CA: CPT

## 2023-11-01 PROCEDURE — 93005 ELECTROCARDIOGRAM TRACING: CPT | Performed by: STUDENT IN AN ORGANIZED HEALTH CARE EDUCATION/TRAINING PROGRAM

## 2023-11-01 PROCEDURE — 85025 COMPLETE CBC W/AUTO DIFF WBC: CPT

## 2023-11-01 PROCEDURE — A9270 NON-COVERED ITEM OR SERVICE: HCPCS | Performed by: INTERNAL MEDICINE

## 2023-11-01 PROCEDURE — 36415 COLL VENOUS BLD VENIPUNCTURE: CPT

## 2023-11-01 PROCEDURE — 700102 HCHG RX REV CODE 250 W/ 637 OVERRIDE(OP): Performed by: INTERNAL MEDICINE

## 2023-11-01 PROCEDURE — 700102 HCHG RX REV CODE 250 W/ 637 OVERRIDE(OP): Performed by: STUDENT IN AN ORGANIZED HEALTH CARE EDUCATION/TRAINING PROGRAM

## 2023-11-01 PROCEDURE — 83735 ASSAY OF MAGNESIUM: CPT

## 2023-11-01 PROCEDURE — 700101 HCHG RX REV CODE 250

## 2023-11-01 PROCEDURE — A9270 NON-COVERED ITEM OR SERVICE: HCPCS | Mod: JZ | Performed by: NURSE PRACTITIONER

## 2023-11-01 PROCEDURE — 80053 COMPREHEN METABOLIC PANEL: CPT

## 2023-11-01 PROCEDURE — C9113 INJ PANTOPRAZOLE SODIUM, VIA: HCPCS | Performed by: INTERNAL MEDICINE

## 2023-11-01 PROCEDURE — 99232 SBSQ HOSP IP/OBS MODERATE 35: CPT | Mod: FS | Performed by: INTERNAL MEDICINE

## 2023-11-01 PROCEDURE — 92526 ORAL FUNCTION THERAPY: CPT

## 2023-11-01 PROCEDURE — 84100 ASSAY OF PHOSPHORUS: CPT

## 2023-11-01 PROCEDURE — 93010 ELECTROCARDIOGRAM REPORT: CPT | Performed by: INTERNAL MEDICINE

## 2023-11-01 PROCEDURE — 85014 HEMATOCRIT: CPT

## 2023-11-01 PROCEDURE — 700111 HCHG RX REV CODE 636 W/ 250 OVERRIDE (IP): Mod: JZ | Performed by: STUDENT IN AN ORGANIZED HEALTH CARE EDUCATION/TRAINING PROGRAM

## 2023-11-01 PROCEDURE — 700102 HCHG RX REV CODE 250 W/ 637 OVERRIDE(OP): Mod: JZ | Performed by: NURSE PRACTITIONER

## 2023-11-01 RX ORDER — METOPROLOL TARTRATE 1 MG/ML
5 INJECTION, SOLUTION INTRAVENOUS
Status: DISCONTINUED | OUTPATIENT
Start: 2023-11-01 | End: 2023-11-10 | Stop reason: HOSPADM

## 2023-11-01 RX ORDER — MAGNESIUM SULFATE HEPTAHYDRATE 40 MG/ML
2 INJECTION, SOLUTION INTRAVENOUS ONCE
Status: COMPLETED | OUTPATIENT
Start: 2023-11-01 | End: 2023-11-01

## 2023-11-01 RX ORDER — POTASSIUM CHLORIDE 20 MEQ/1
40 TABLET, EXTENDED RELEASE ORAL 3 TIMES DAILY
Status: DISCONTINUED | OUTPATIENT
Start: 2023-11-01 | End: 2023-11-10 | Stop reason: HOSPADM

## 2023-11-01 RX ORDER — DOCUSATE SODIUM 100 MG/1
100 CAPSULE, LIQUID FILLED ORAL 2 TIMES DAILY
Status: DISCONTINUED | OUTPATIENT
Start: 2023-11-01 | End: 2023-11-10 | Stop reason: HOSPADM

## 2023-11-01 RX ORDER — AMOXICILLIN 250 MG
2 CAPSULE ORAL 2 TIMES DAILY
Status: DISCONTINUED | OUTPATIENT
Start: 2023-11-01 | End: 2023-11-10 | Stop reason: HOSPADM

## 2023-11-01 RX ORDER — SODIUM CHLORIDE 9 MG/ML
INJECTION, SOLUTION INTRAVENOUS CONTINUOUS
Status: DISCONTINUED | OUTPATIENT
Start: 2023-11-01 | End: 2023-11-01

## 2023-11-01 RX ORDER — FUROSEMIDE 20 MG/1
20 TABLET ORAL
Status: DISCONTINUED | OUTPATIENT
Start: 2023-11-02 | End: 2023-11-01

## 2023-11-01 RX ORDER — ALBUTEROL SULFATE 90 UG/1
2 AEROSOL, METERED RESPIRATORY (INHALATION) EVERY 4 HOURS PRN
Status: DISCONTINUED | OUTPATIENT
Start: 2023-11-01 | End: 2023-11-10 | Stop reason: HOSPADM

## 2023-11-01 RX ORDER — POLYETHYLENE GLYCOL 3350 17 G/17G
1 POWDER, FOR SOLUTION ORAL DAILY
Status: DISCONTINUED | OUTPATIENT
Start: 2023-11-02 | End: 2023-11-10 | Stop reason: HOSPADM

## 2023-11-01 RX ORDER — BISACODYL 10 MG
10 SUPPOSITORY, RECTAL RECTAL
Status: DISCONTINUED | OUTPATIENT
Start: 2023-11-01 | End: 2023-11-10 | Stop reason: HOSPADM

## 2023-11-01 RX ADMIN — ROTIGOTINE 4 MG: 4 PATCH, EXTENDED RELEASE TRANSDERMAL at 17:46

## 2023-11-01 RX ADMIN — METOPROLOL TARTRATE 5 MG: 5 INJECTION INTRAVENOUS at 21:45

## 2023-11-01 RX ADMIN — DOCUSATE SODIUM 50 MG AND SENNOSIDES 8.6 MG 2 TABLET: 8.6; 5 TABLET, FILM COATED ORAL at 05:54

## 2023-11-01 RX ADMIN — MAGNESIUM SULFATE HEPTAHYDRATE 2 G: 2 INJECTION, SOLUTION INTRAVENOUS at 11:35

## 2023-11-01 RX ADMIN — METOPROLOL SUCCINATE 12.5 MG: 25 TABLET, EXTENDED RELEASE ORAL at 21:15

## 2023-11-01 RX ADMIN — HYDROCORTISONE SODIUM SUCCINATE 50 MG: 100 INJECTION, POWDER, FOR SOLUTION INTRAMUSCULAR; INTRAVENOUS at 05:48

## 2023-11-01 RX ADMIN — PANTOPRAZOLE SODIUM 40 MG: 40 INJECTION, POWDER, FOR SOLUTION INTRAVENOUS at 05:41

## 2023-11-01 RX ADMIN — LEVOTHYROXINE, LIOTHYRONINE 60 MG: 19; 4.5 TABLET ORAL at 05:53

## 2023-11-01 RX ADMIN — SODIUM CHLORIDE: 9 INJECTION, SOLUTION INTRAVENOUS at 15:34

## 2023-11-01 RX ADMIN — TAMSULOSIN HYDROCHLORIDE 0.4 MG: 0.4 CAPSULE ORAL at 07:56

## 2023-11-01 RX ADMIN — POTASSIUM CHLORIDE 40 MEQ: 1500 TABLET, EXTENDED RELEASE ORAL at 11:35

## 2023-11-01 RX ADMIN — PANTOPRAZOLE SODIUM 40 MG: 40 INJECTION, POWDER, FOR SOLUTION INTRAVENOUS at 17:29

## 2023-11-01 RX ADMIN — POTASSIUM CHLORIDE 40 MEQ: 1500 TABLET, EXTENDED RELEASE ORAL at 17:29

## 2023-11-01 RX ADMIN — FINASTERIDE 5 MG: 5 TABLET, FILM COATED ORAL at 05:53

## 2023-11-01 RX ADMIN — POTASSIUM CHLORIDE 40 MEQ: 1500 TABLET, EXTENDED RELEASE ORAL at 05:52

## 2023-11-01 RX ADMIN — Medication 10 MG: at 22:58

## 2023-11-01 ASSESSMENT — ENCOUNTER SYMPTOMS
BRUISES/BLEEDS EASILY: 0
STRIDOR: 0
ABDOMINAL PAIN: 0
VOMITING: 0
DIZZINESS: 0
SHORTNESS OF BREATH: 0
FEVER: 0
FALLS: 1
PSYCHIATRIC NEGATIVE: 1
WOUND: 0
SINUS PAIN: 0
CHILLS: 0
DIAPHORESIS: 0
ABDOMINAL DISTENTION: 0
MYALGIAS: 1
BLOOD IN STOOL: 0
FATIGUE: 1
NAUSEA: 0
DIARRHEA: 0
LIGHT-HEADEDNESS: 0
PALPITATIONS: 0
COUGH: 0
CONSTIPATION: 1
WHEEZING: 0
HEADACHES: 0
CHEST TIGHTNESS: 0

## 2023-11-01 ASSESSMENT — PAIN DESCRIPTION - PAIN TYPE
TYPE: ACUTE PAIN
TYPE: ACUTE PAIN

## 2023-11-01 ASSESSMENT — FIBROSIS 4 INDEX: FIB4 SCORE: 2.31

## 2023-11-01 NOTE — PROGRESS NOTES
At around 2100 I got a call from the monitor room that the patient's HR is 120-140, sustaining at 120's. The patient was asymptomatic, no complains of chest pain or difficulty breathing. /68, RR 30, blood sugar 129. The Rapid Response team happened to be on the unit at that moment. The patient was assessed by them. Patient is currently resting with no signs of acute distress.

## 2023-11-01 NOTE — DISCHARGE PLANNING
Discussed pt during afternoon rounds. Per Geoffrey MEMBRENO, pt might medically discharge tomorrow afternoon.

## 2023-11-01 NOTE — PROGRESS NOTES
Monitor summary:        Rhythm: Afib  Rate: , touched to 150-160 (not sustaining)  Ectopy: (F)PVC  Measurements: 0/.05/.31        12hr chart check

## 2023-11-01 NOTE — PROGRESS NOTES
Bedside report received from day shift RN. Assumed care of pt. Pt awake, laying in bed. A/Ox4, VSS. No concerns, complaints or distress. Pt educated to call before getting out of bed. POC reviewed and white board updated. Tele box on. A-fib 113 on the monitor. Call light in reach. Bed locked in lowest position with 2 upper bed rails up. Bed alarm on.

## 2023-11-01 NOTE — CARE PLAN
Problem: Knowledge Deficit - Standard  Goal: Patient and family/care givers will demonstrate understanding of plan of care, disease process/condition, diagnostic tests and medications  Outcome: Progressing     Problem: Skin Integrity  Goal: Skin integrity is maintained or improved  Outcome: Progressing     Problem: Fall Risk  Goal: Patient will remain free from falls  Outcome: Progressing     Problem: Fluid Volume  Goal: Fluid volume balance will be maintained  Outcome: Progressing     Problem: Nutrition  Goal: Patient's nutritional and fluid intake will be adequate or improve  Outcome: Progressing  Goal: Enteral nutrition will be maintained or improve  Outcome: Progressing  Goal: Enteral nutrition will be maintained or improve  Outcome: Progressing     Problem: Urinary Elimination  Goal: Establish and maintain regular urinary output  Outcome: Progressing     Problem: Bowel Elimination  Goal: Establish and maintain regular bowel function  Outcome: Progressing       Problem: Self Care  Goal: Patient will have the ability to perform ADLs independently or with assistance (bathe, groom, dress, toilet and feed)  Outcome: Progressing     Problem: Communication  Goal: The ability to communicate needs accurately and effectively will improve  Outcome: Progressing     Problem: Mobility  Goal: Patient's capacity to carry out activities will improve  Outcome: Progressing     Problem: Respiratory  Goal: Patient will achieve/maintain optimum respiratory ventilation and gas exchange  Outcome: Progressing      The patient is Stable - Low risk of patient condition declining or worsening    Shift Goals  Clinical Goals: heart monitoring, comfort  Patient Goals: rest, nutrition  Family Goals: AYDEN    Progress made toward(s) clinical / shift goals:    Pt had a bowel movement today. Pt has a great appetite. Electrolytes were replaced this morning.     Patient is not progressing towards the following goals:  Pt's legs are very swollen  and pt was unable to get Lasix this morning. BP's remain soft.

## 2023-11-01 NOTE — PROGRESS NOTES
[facility] Medicine Daily Progress Note    Date of Service  10/31/2023    Chief Complaint  Elias Casanova is a 82 y.o. male admitted 10/26/2023 with NSTEMI, rhabdomyolysis     Hospital Course  This is a  82 y.o. male who was transferred from outside facility 10/26/2023 with rhabdomyolysis and elevated troponin.  PMH of A-fib on anticoagulation, Parkinson disease, hypertension, pulmonary hypertension  He was found on the ground, says he had fallen out of bed, he does not believe he lost consciousness.  He was too weak to get back up and thinks he was on the ground for about 12 hours or more.  He is found to have rhabdomyolysis at outside facility with CPK of 1100. He was also found to have elevated troponin and was sent here for high level of care     Interval Problem Update  Patient seen and examined, weak tirred, denie chest pain, troponin trending up, and his BP is also low. I held his metoprolol today as his SBP went as low as 70.   His CPK also trending up so he has been started on IV fluid, but will need to be carefull as patient has already lower extremity edema.  Cardiology was consulted for concern for NSTEMI and he has been started on heparin drip, an echo has been ordered   His sister is at bed side and we discussed with his sister and patient regarding goals of care code status has been changed to DNR/DNI   Initially declined echo but sister said will talk and try to convince him to have the echo   Prognosis is really guarded at this time   Restarted back his parkinson medication   10/28: Patient seen and examined, daughter at bedside updated on gals of care.  Cardiology following case discussed with Dr. Kelly , echo pending, troponin up to around 1600. Cont on heparin drip.  Hypotension, he has h/o of adrenal insufficiency with give IV hydrocortisone for stress dose steroids  CPK around 1100 today close monitoring for renal failure   Prognosis guarded   10/29: Patient seen and examined, today, his  hemoglobin has dropped down to 6.5 this AM. Will give a unit of RBC, stoping heparin drip and also holding aspirin for now.  Not on IV fluid but on lasix now as patient is fluid overload.  A rapid was called overnight, CXr concern for pneumonia started on IV abx.  Cardiology following , echo pending case discussed appreciate rec.   Monitor his Renal failure and also CPK   Prognosis Guarded   10/30: Patient seen and examined, he had a low hemoglobin of 6.5 yesterday and was given a unit of blood and this brought hsi hemoglobin up to 9.4 so unclear if that 6.5 was an error.  But have ordered for FOBT to r/o any GI bleed. If that positive will consider consulting GI  Seen by cardiology again today and as per cardiology no antiplatelet or  OAC until his anemia source clarified.  I have  also consulted palliative team  to discuss goals of care    TAKING OVER CARE  10/31  Examined in his inpatient room, he was sitting up in bedside chair.   He is afebrile, HR  SBP  SpO2 % on room air.   Leukocytosis persists, up to 14.8, Hgb fluctuating 7-10 over the last couple days, no obvious bleeding on plavix, constipated, getting miralax. BMP potassium 3.0, Bicarb 17, BUN 43 Scr 1.54. 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 Will talk with patient and see if he would want to pursue EGD/colonoscopy if offered.     I have discussed this patient's plan of care and discharge plan at IDT rounds today with Case Management, Nursing, Nursing leadership, and other members of the IDT team.    Consultants/Specialty  cardiology    Code Status  DNAR/DNI    Disposition  Medically Cleared  I have placed the appropriate orders for post-discharge needs.    Review of  Systems  Review of Systems   Constitutional:  Negative for chills and fever.   HENT:  Negative for congestion, nosebleeds and sinus pain.    Respiratory:  Negative for cough and shortness of breath.    Cardiovascular:  Negative for chest pain.   Gastrointestinal:  Negative for abdominal pain, diarrhea, nausea and vomiting.   Genitourinary:  Negative for dysuria and urgency.   Musculoskeletal:  Positive for falls and myalgias.   All other systems reviewed and are negative.       Physical Exam  Temp:  [36.1 °C (97 °F)-37 °C (98.6 °F)] 37 °C (98.6 °F)  Pulse:  [] 128  Resp:  [16-19] 17  BP: ()/(57-68) 107/68  SpO2:  [97 %-99 %] 98 %    Physical Exam  Constitutional:       General: He is not in acute distress.     Appearance: Normal appearance. He is not toxic-appearing.   HENT:      Head: Normocephalic and atraumatic.      Mouth/Throat:      Mouth: Mucous membranes are moist.      Pharynx: Oropharynx is clear. No oropharyngeal exudate or posterior oropharyngeal erythema.   Eyes:      General: No scleral icterus.  Cardiovascular:      Rate and Rhythm: Normal rate and regular rhythm.      Pulses: Normal pulses.      Heart sounds: Normal heart sounds. No murmur heard.  Pulmonary:      Effort: Pulmonary effort is normal. No respiratory distress.      Breath sounds: Normal breath sounds. No wheezing.   Abdominal:      Palpations: Abdomen is soft.      Tenderness: There is no abdominal tenderness.   Musculoskeletal:         General: No swelling or tenderness. Normal range of motion.      Cervical back: Normal range of motion.   Skin:     General: Skin is warm and dry.   Neurological:      General: No focal deficit present.      Mental Status: He is alert and oriented to person, place, and time. Mental status is at baseline.   Psychiatric:         Mood and Affect: Mood normal.         Behavior: Behavior normal.         Fluids    Intake/Output Summary (Last 24 hours) at 10/31/2023 2423  Last data filed at  10/31/2023 0856  Gross per 24 hour   Intake 0 ml   Output 2650 ml   Net -2650 ml         Laboratory  Recent Labs     10/30/23  0559 10/30/23  1257 10/31/23  0114 10/31/23  0936 10/31/23  1653   WBC 13.5*  --  13.1* 14.8*  --    RBC 3.38*  --  2.80* 3.53*  --    HEMOGLOBIN 9.4*   < > 7.7* 9.7* 10.3*   HEMATOCRIT 30.8*   < > 25.4* 31.6* 33.3*   MCV 91.1  --  90.7 89.5  --    MCH 27.8  --  27.5 27.5  --    MCHC 30.5*  --  30.3* 30.7*  --    RDW 53.7*  --  54.4* 53.1*  --    PLATELETCT 406  --  339 423  --    MPV 10.2  --  10.3 10.5  --     < > = values in this interval not displayed.       Recent Labs     10/29/23  0344 10/30/23  0559 10/31/23  0114   SODIUM 136 137 136   POTASSIUM 3.6 3.2* 3.0*   CHLORIDE 109 105 106   CO2 14* 19* 17*   GLUCOSE 122* 126* 98   BUN 46* 47* 43*   CREATININE 1.46* 1.64* 1.54*   CALCIUM 7.4* 8.5 7.7*                         Imaging  DX-ESOPHAGUS - HBBA-BSATS-ZG   Final Result      EC-ECHOCARDIOGRAM COMPLETE W/O CONT   Final Result      DX-CHEST-PORTABLE (1 VIEW)   Final Result      1.  Hypoinflation with elevated LEFT hemidiaphragm and LEFT lung base atelectasis or pneumonia.   2.  Probable mild edema.   3.  No pneumothorax.           Assessment/Plan  * NSTEMI (non-ST elevated myocardial infarction) (HCC)- (present on admission)  Assessment & Plan  Troponin significantly elevated at outside facility and trending up, in the 600s here  EKG from transferring facility not concerning for acute ischemia, repeat EKG here ordered  Patient fell out of bed and was unable to get up, he does have a history of Parkinson's disease.  Unclear if he had a coronary event that resulted in him falling  I have consulted cardiology and discussed case with Dr. Kelly   Will continue on heparin drip for now, echo has been ordered since troponin trending up   No cardiac cath at this time as patient not a good candidate for it.  He declined echo initially now family at bedside and we had discussion, with patient  sister who will discuss with him again to see if she can convince him to have the echo   close monitoring on tele   Echo showing ischemia/ MI on the LAD territory   Cardiology following  As per cardiology to hold off on his antiplatelet or OAC forhis A.fibb due to his anemia  I have ordered a FOBT to r/o GI bleed     Advance care planning  Assessment & Plan  His sister is now at bedside and we had a long discussion with patient and his sister regarding goals of care.  Since patient has declined echo today we discussed regarding code status and also comfort care and hospice  Will change patient to DNR/DNI for now , no hospice or comfort care  Sister will talk and try to convince him to see if he is willing to have the echo   More then 18 min spent on discussion for goals of care alone       Anemia- (present on admission)  Assessment & Plan  Baseline hemoglobin 12-14.  Was 8.9 on presentation.    His OAC and antiplatelet have been held. I have ordered for FOBT to r/o GI bleed if that positive will consult GI   His hemoglobin yesterday was 6.5 and got 1 unit of RBC which brought his hemoglobin up to 9.4 uncleare if the 6.5 was an eror or possible due to his fluid overload   I have ordered for FOBT if that is positive will consult GI     Non-traumatic rhabdomyolysis- (present on admission)  Assessment & Plan  CPK continues to trend up starting IV fluid but need to be careful  as patient is edematous   This was also discussed with family     Persistent atrial fibrillation (HCC)- (present on admission)  Assessment & Plan  Follows with cardiology, holding metoprolol as his BP is low   Holding pradaxa as he is on heparin drip     Parkinson disease- (present on admission)  Assessment & Plan  Cont outpatient regimen   PT, OT ordered       VTE prophylaxis: scd    The very real possibilty of a deterioration of this patient's condition required the highest level of my preparedness for sudden, emergent intervention.  I provided   services, which included medication orders, frequent reevaluations of the patient's condition and response to treatment, ordering and reviewing test results, and discussing the case with various consultants.  The  time associated with the care of the patient was 53  minutes.             I have performed a physical exam and reviewed and updated ROS and Plan today (10/31/2023). In review of yesterday's note (10/30/2023), there are no changes except as documented above.

## 2023-11-01 NOTE — THERAPY
"Speech Language Pathology   Daily Treatment     Patient Name: Elias Casanova  AGE:  82 y.o., SEX:  male  Medical Record #: [id number]  Date of Service: 11/1/2023      Precautions:  Precautions: Fall Risk, Non Weight Bearing Right Upper Extremity, Swallow Precautions       Subjective  RN cleared patient for dysphagia management. Pt received awake, alert, upright in bed consuming lunch tray. Pt endorsed tolerating lunch meal okay.       Assessment  Pt recalled results of MBSS and strategies provided. Reported self-implementing multiple swallows per bolus. Endorsed feeling nervous with eating and \"worried about choking.\" Did not recall swallowing exercises worked on previous date. Reviewed exercises including effortful swallow, chin tuck against resistance, and BOT retraction. Pt completed effortful swallow x20 with fair-good accuracy and moderate cues. BOT retraction completed x1. No further BOT retraction or CTAR targeted this date as pt wanting to continue eating lunch tray. Pt able to self-feed with some difficulty, however politely declined any assistance. No cough/throat clear appreciated throughout oral intake. Intermittent wet vocal quality, cleared with cued cough and re-swallow.       Clinical Impressions  Per MBSS completed 10/31, patient presents with severe oropharyngeal dysphagia. Recommend continuation of regular solids and thin liquids diet despite known risk with use of strategies to maximize pulmonary hygiene including diligent oral care and mobility as pt is able. Service to follow for continuation of behavioral and exercise-based swallow rehabilitation.         Recommendations  Treatment Completed: Dysphagia Treatment     Dysphagia Treatment  Diet Consistency: Regular solids/thin liquids  Instrumentation: None indicated at this time  Medication: Crush with applesauce, as appropriate, Crush with pudding/puree, as appropriate  Supervision: Assist with meal tray set up, Distant supervision - check on " "patient 2-3 times per meal  Positioning: Fully upright and midline during oral intake, Meals sitting upright in a chair, as tolerated  Risk Management : Small bites/sips, Slow rate of intake, Physical mobility, as tolerated  Oral Care:  At least TID after meals      SLP Treatment Plan  Treatment Plan: Dysphagia Treatment, Patient/Family/Caregiver Training  SLP Frequency: 4x Per Week  Estimated Duration: Until Therapy Goals Met      Anticipated Discharge Needs  Discharge Recommendations: Recommend post-acute placement for additional speech therapy services prior to discharge home  Therapy Recommendations Upon DC: Dysphagia Training, Patient / Family / Caregiver Education, Community Re-Integration      Patient / Family Goals  Patient / Family Goal #1: \"It gets stuck sometimes.\"  Goal #1 Outcome: Progressing as expected  Short Term Goals  Short Term Goal # 1: Pt will complete MBSS w SLP to further evaluate swallow function and inform POC.  Goal Outcome # 1: Goal met, new goal added  Short Term Goal # 1 B : Pt will consume diet of RG/TN given strategies to minimize risk and maximize pulmonary hygiene.  Goal Outcome  # 1 B: Progressing as expected  Short Term Goal # 2: Pt will complete exercises targeting hyolaryngeal movement, LVC, BOT retaction, and pharyngeal constriction/shortening x40 in a session given mod cues from SLP.  Goal Outcome # 2 : Progressing as expected      VENTURA Baker  "

## 2023-11-01 NOTE — PROGRESS NOTES
Cardiology Follow Up Progress Note    Date of Service  11/1/2023    Attending Physician  Stefan Davis M.D.    Chief Complaint   Fall    HPI  Edward Casanova is a 82 y.o. male admitted 10/26/2023 with prior medical history of chronic Atrial Fibrillation (on Pradaxa), Parkinson's disease, and primary adrenal insufficiency (on prednisone) presented after a ground level fall, found down after 12 hours, and rhabdomyolysis. The work up revealed NSTEMI. Patient has been treated medically for NSTEMI. His stay is complicated by anemia requiring a blood transfusion.     Interim Events  10/31/2023: No overnight cardiac events. Tele monitoring personally interpreted by me shows Afib . VSS; RA; Daily weight 75.9 kg, bedscale; - 4.7 L UOP yesterday. Labs reviewed, notable for K 3.0, Ca 7.7, Serum Kappa/edgar free light chain positive. Occult blood positive. Continue medical management as tolerated. Consider Heme/onc consult for light chains if aligns with goals of care. Palliative to consult today.       11/01/2023  No overnight cardiac events. Tele monitoring personally interpreted by me shows sustaining Afib 70's-120, up to 160 nonsustained.  Blood pressure is again in the 80's overnight. On room air, states his breathing is better. Weight 76.7 kg this am, by bed scale. UOP - 800 ml overnight. Noted a drop in Hgb today from 10.3 to 8. Patient has no obvious bleeding. Constipated with the last charted BM on 10/28. FOBT ordered.   Continue medical management for NSTEMI and A Fib as able.  Recommend not restarting Pradaxa, aspirin or Plavix at this time as the risk of bleeding currently outweighs the benefit.     Review of Systems  Review of Systems   Constitutional:  Positive for fatigue. Negative for chills, diaphoresis and fever.   HENT: Negative.     Respiratory:  Negative for cough, chest tightness, shortness of breath (improved per patient), wheezing and stridor.    Cardiovascular:  Positive for leg swelling. Negative for  chest pain and palpitations.   Gastrointestinal:  Positive for constipation. Negative for abdominal distention, abdominal pain, blood in stool, diarrhea, nausea and vomiting.   Genitourinary:  Negative for hematuria.   Skin:  Negative for rash and wound.   Neurological:  Negative for dizziness, syncope, light-headedness and headaches.   Hematological:  Does not bruise/bleed easily.   Psychiatric/Behavioral: Negative.         Vital signs in last 24 hours  Temp:  [36.1 °C (97 °F)-37 °C (98.6 °F)] 36.5 °C (97.7 °F)  Pulse:  [] 78  Resp:  [12-17] 12  BP: ()/(54-68) 88/56  SpO2:  [97 %-100 %] 98 %    Physical Exam  Physical Exam  Constitutional:       Appearance: He is ill-appearing.   HENT:      Head: Normocephalic and atraumatic.      Mouth/Throat:      Mouth: Mucous membranes are dry.   Cardiovascular:      Rate and Rhythm: Tachycardia present. Rhythm irregular.      Pulses: Normal pulses.      Heart sounds: No murmur heard.     No friction rub. No gallop.   Pulmonary:      Effort: Pulmonary effort is normal.      Breath sounds: No rales.   Abdominal:      General: Bowel sounds are normal. There is no distension.   Musculoskeletal:      Right lower leg: Edema (2+) present.      Left lower leg: Edema (2+) present.   Skin:     Coloration: Skin is pale.      Findings: No bruising.   Neurological:      Mental Status: He is oriented to person, place, and time.   Psychiatric:         Thought Content: Thought content normal.         Judgment: Judgment normal.         Lab Review  Lab Results   Component Value Date/Time    WBC 12.7 (H) 11/01/2023 01:07 AM    RBC 2.89 (L) 11/01/2023 01:07 AM    HEMOGLOBIN 8.0 (L) 11/01/2023 01:07 AM    HEMATOCRIT 26.2 (L) 11/01/2023 01:07 AM    MCV 90.7 11/01/2023 01:07 AM    MCH 27.7 11/01/2023 01:07 AM    MCHC 30.5 (L) 11/01/2023 01:07 AM    MPV 10.4 11/01/2023 01:07 AM      Lab Results   Component Value Date/Time    SODIUM 138 11/01/2023 01:07 AM    POTASSIUM 3.3 (L) 11/01/2023  "01:07 AM    CHLORIDE 105 11/01/2023 01:07 AM    CO2 19 (L) 11/01/2023 01:07 AM    GLUCOSE 80 11/01/2023 01:07 AM    BUN 48 (H) 11/01/2023 01:07 AM    CREATININE 1.56 (H) 11/01/2023 01:07 AM      Lab Results   Component Value Date/Time    ASTSGOT 250 (H) 11/01/2023 01:07 AM    ALTSGPT 597 (H) 11/01/2023 01:07 AM     Lab Results   Component Value Date/Time    CHOLSTRLTOT 95 (L) 10/27/2023 12:04 AM    LDL 59 10/27/2023 12:04 AM    HDL 32 (A) 10/27/2023 12:04 AM    TRIGLYCERIDE 20 10/27/2023 12:04 AM    TROPONINT 1623 (H) 10/29/2023 03:44 AM       No results for input(s): \"NTPROBNP\" in the last 72 hours.      Cardiac Imaging and Procedures Review  EKG:  10/28/2023 is Atrial Fibrillation, rate 108    Echocardiogram:  10/29/2023  CONCLUSIONS  Low normal ventricular systolic function.  The ejection fraction is measured to be 52% by Carrion's biplane.  There is akinesis of the mid to distal septum and apex consistent with   LAD territory ischemia/infarct.  Diastolic function is difficult to assess with arrhythmia.  Normal right ventricular size with borderline reduced systolic   function.  Unable to estimate pulmonary artery pressure due to an inadequate   visualization of the inferior vena cava.  Severe biatrial dilation.  There appears to be a prominent Chiari network, however, would consider   a transesophageal echocardiogram if there was a strong concern for   endocarditis.  Eccentric, probably mild tricuspid regurgitation.  The inferior vena cava is not well visualized.    Imaging  Chest X-Ray:  10/28/2023  IMPRESSION:  1.  Hypoinflation with elevated LEFT hemidiaphragm and LEFT lung base atelectasis or pneumonia.  2.  Probable mild edema.  3.  No pneumothorax.     Assessment/Plan  NSTEMI   - Non-ST elevation MI with troponin levels peaked at 1,724 on 10/28/2023. Trending down currently to 1,623 (10/30/2023).   - Patient denies chest pain, states he never had it.   - Medical management is elected by patient and family. " Patient declined cardiac catheterization.  - Echo shows akinesis of the mid to distal septum and apex consistent with   LAD territory ischemia/infarct.  -  Hold off on antiplatelets at this time. Await GI recommendations  - patient has been hypotensive and not been able to tolerate any beta-blockers or ARB, consider starting once blood pressure is improved  - Consider starting a atorvastatin once liver functions are stable.     Chronic Atrial Fibrillation   - rate currently 's, up to 160 overnight, non sustained.    - Unable to tolerate metoprolol due to hypotension currently. Restart when able.    - patient is on Pradaxa at home. In the setting of NSTEMI, Heparin gtt was started initially. It was then stopped due to a drop of Hgb to 6.5. Improved after a unit of RBCs.    - Recommend not restarting Pradaxa, aspirin or Plavix at this time as the risk of bleeding currently outweighs the benefit. Recommend starting Plavix when hemoglobin improves.    - keep K >4, Mg >2.     Anemia; +K/L meme chains   - Unclear etiology for anemia.   - Hgb dropped to 8 from 10.3 today 11/1/23. Still waiting on the FOBT. Patient is constipated though. -Primary team to possibly consult GI if FOBT +.   -consider heme/onc consult for +K/L meme chains if aligns with goals of care  - BP soft in the 80's, appears dry intervascularly, likely anasarca. Albumin 2.5. Lasix was held today due to hypotension. Will try Lasix 20 mg PO tomorrow if able to tolerate.     Future Appointments   Date Time Provider Department Center   12/14/2023 12:45 PM JOVANNI Garcia [facility] None       Please contact me with any questions.  Thank you for allowing us to participate in the care of this patient. Discussed with Dr. Stuart    Cardiology will sign off.       Please see Dr. Stuart  attestation for further details and MDM.     Annette DE LA GARZA A.P.R.N. performed a portion of the service face-to-face with the same  patient on the same  date of service INDEPENDENTLY OF Dr. Stuart FOR 15 MINUTES. I was personally involved in reviewing and conducting elements of the history, exam and/or medical decision making, including the information as described above.    Please note this dictation was created using voice recognition software.  I have made every reasonable attempt to correct obvious errors, but there may be errors of grammar and possibly content that I did not discover before finalizing the note.    Annette Sanchez, MSN, APRN  [facility]  [phone]

## 2023-11-01 NOTE — CARE PLAN
The patient is Stable - Low risk of patient condition declining or worsening    Shift Goals  Clinical Goals: heart monitoring, comfort  Patient Goals: rest, nutrition  Family Goals: AYDEN    Progress made toward(s) clinical / shift goals:      Problem: Skin Integrity  Goal: Skin integrity is maintained or improved  Outcome: Progressing     Problem: Fall Risk  Goal: Patient will remain free from falls  Outcome: Progressing     Problem: Communication  Goal: The ability to communicate needs accurately and effectively will improve  Outcome: Progressing       Patient is not progressing towards the following goals:

## 2023-11-02 LAB
ALBUMIN SERPL BCP-MCNC: 2.6 G/DL (ref 3.2–4.9)
ALBUMIN/GLOB SERPL: 1.3 G/DL
ALP SERPL-CCNC: 89 U/L (ref 30–99)
ALT SERPL-CCNC: 424 U/L (ref 2–50)
ANION GAP SERPL CALC-SCNC: 9 MMOL/L (ref 7–16)
AST SERPL-CCNC: 101 U/L (ref 12–45)
BASOPHILS # BLD AUTO: 0.1 % (ref 0–1.8)
BASOPHILS # BLD: 0.01 K/UL (ref 0–0.12)
BILIRUB SERPL-MCNC: 0.2 MG/DL (ref 0.1–1.5)
BUN SERPL-MCNC: 35 MG/DL (ref 8–22)
CALCIUM ALBUM COR SERPL-MCNC: 9.1 MG/DL (ref 8.5–10.5)
CALCIUM SERPL-MCNC: 8 MG/DL (ref 8.5–10.5)
CHLORIDE SERPL-SCNC: 106 MMOL/L (ref 96–112)
CO2 SERPL-SCNC: 23 MMOL/L (ref 20–33)
CREAT SERPL-MCNC: 1.1 MG/DL (ref 0.5–1.4)
EOSINOPHIL # BLD AUTO: 0.41 K/UL (ref 0–0.51)
EOSINOPHIL NFR BLD: 3.9 % (ref 0–6.9)
ERYTHROCYTE [DISTWIDTH] IN BLOOD BY AUTOMATED COUNT: 53.7 FL (ref 35.9–50)
GFR SERPLBLD CREATININE-BSD FMLA CKD-EPI: 67 ML/MIN/1.73 M 2
GLOBULIN SER CALC-MCNC: 2 G/DL (ref 1.9–3.5)
GLUCOSE SERPL-MCNC: 83 MG/DL (ref 65–99)
HCT VFR BLD AUTO: 24.7 % (ref 42–52)
HCT VFR BLD AUTO: 27.5 % (ref 42–52)
HCT VFR BLD AUTO: 31 % (ref 42–52)
HGB BLD-MCNC: 7.3 G/DL (ref 14–18)
HGB BLD-MCNC: 8.2 G/DL (ref 14–18)
HGB BLD-MCNC: 9.1 G/DL (ref 14–18)
IMM GRANULOCYTES # BLD AUTO: 0.05 K/UL (ref 0–0.11)
IMM GRANULOCYTES NFR BLD AUTO: 0.5 % (ref 0–0.9)
LYMPHOCYTES # BLD AUTO: 0.66 K/UL (ref 1–4.8)
LYMPHOCYTES NFR BLD: 6.3 % (ref 22–41)
MAGNESIUM SERPL-MCNC: 2.1 MG/DL (ref 1.5–2.5)
MCH RBC QN AUTO: 26.6 PG (ref 27–33)
MCHC RBC AUTO-ENTMCNC: 29.6 G/DL (ref 32.3–36.5)
MCV RBC AUTO: 90.1 FL (ref 81.4–97.8)
MONOCYTES # BLD AUTO: 0.96 K/UL (ref 0–0.85)
MONOCYTES NFR BLD AUTO: 9.2 % (ref 0–13.4)
NEUTROPHILS # BLD AUTO: 8.31 K/UL (ref 1.82–7.42)
NEUTROPHILS NFR BLD: 80 % (ref 44–72)
NRBC # BLD AUTO: 0.03 K/UL
NRBC BLD-RTO: 0.3 /100 WBC (ref 0–0.2)
PLATELET # BLD AUTO: 321 K/UL (ref 164–446)
PMV BLD AUTO: 10.2 FL (ref 9–12.9)
POTASSIUM SERPL-SCNC: 4.1 MMOL/L (ref 3.6–5.5)
PROT SERPL-MCNC: 4.6 G/DL (ref 6–8.2)
RBC # BLD AUTO: 2.74 M/UL (ref 4.7–6.1)
SODIUM SERPL-SCNC: 138 MMOL/L (ref 135–145)
WBC # BLD AUTO: 10.4 K/UL (ref 4.8–10.8)

## 2023-11-02 PROCEDURE — 770020 HCHG ROOM/CARE - TELE (206)

## 2023-11-02 PROCEDURE — 700102 HCHG RX REV CODE 250 W/ 637 OVERRIDE(OP): Performed by: STUDENT IN AN ORGANIZED HEALTH CARE EDUCATION/TRAINING PROGRAM

## 2023-11-02 PROCEDURE — 85025 COMPLETE CBC W/AUTO DIFF WBC: CPT

## 2023-11-02 PROCEDURE — A9270 NON-COVERED ITEM OR SERVICE: HCPCS | Performed by: STUDENT IN AN ORGANIZED HEALTH CARE EDUCATION/TRAINING PROGRAM

## 2023-11-02 PROCEDURE — 700111 HCHG RX REV CODE 636 W/ 250 OVERRIDE (IP): Mod: JZ | Performed by: STUDENT IN AN ORGANIZED HEALTH CARE EDUCATION/TRAINING PROGRAM

## 2023-11-02 PROCEDURE — C9113 INJ PANTOPRAZOLE SODIUM, VIA: HCPCS | Performed by: INTERNAL MEDICINE

## 2023-11-02 PROCEDURE — 85014 HEMATOCRIT: CPT

## 2023-11-02 PROCEDURE — 92526 ORAL FUNCTION THERAPY: CPT

## 2023-11-02 PROCEDURE — 85018 HEMOGLOBIN: CPT | Mod: 91

## 2023-11-02 PROCEDURE — 83735 ASSAY OF MAGNESIUM: CPT

## 2023-11-02 PROCEDURE — 80053 COMPREHEN METABOLIC PANEL: CPT

## 2023-11-02 PROCEDURE — A9270 NON-COVERED ITEM OR SERVICE: HCPCS | Performed by: INTERNAL MEDICINE

## 2023-11-02 PROCEDURE — 99233 SBSQ HOSP IP/OBS HIGH 50: CPT | Performed by: STUDENT IN AN ORGANIZED HEALTH CARE EDUCATION/TRAINING PROGRAM

## 2023-11-02 PROCEDURE — 700102 HCHG RX REV CODE 250 W/ 637 OVERRIDE(OP): Performed by: INTERNAL MEDICINE

## 2023-11-02 PROCEDURE — 700111 HCHG RX REV CODE 636 W/ 250 OVERRIDE (IP): Performed by: INTERNAL MEDICINE

## 2023-11-02 PROCEDURE — 36415 COLL VENOUS BLD VENIPUNCTURE: CPT

## 2023-11-02 RX ORDER — METOPROLOL TARTRATE 1 MG/ML
5 INJECTION, SOLUTION INTRAVENOUS
Status: DISCONTINUED | OUTPATIENT
Start: 2023-11-02 | End: 2023-11-02

## 2023-11-02 RX ORDER — PREDNISONE 5 MG/1
5 TABLET ORAL DAILY
Status: DISCONTINUED | OUTPATIENT
Start: 2023-11-03 | End: 2023-11-10 | Stop reason: HOSPADM

## 2023-11-02 RX ORDER — METOPROLOL SUCCINATE 25 MG/1
12.5 TABLET, EXTENDED RELEASE ORAL ONCE
Status: DISCONTINUED | OUTPATIENT
Start: 2023-11-02 | End: 2023-11-03

## 2023-11-02 RX ORDER — METOPROLOL SUCCINATE 25 MG/1
25 TABLET, EXTENDED RELEASE ORAL DAILY
Status: DISCONTINUED | OUTPATIENT
Start: 2023-11-03 | End: 2023-11-03

## 2023-11-02 RX ADMIN — DOCUSATE SODIUM 100 MG: 100 CAPSULE, LIQUID FILLED ORAL at 06:12

## 2023-11-02 RX ADMIN — FINASTERIDE 5 MG: 5 TABLET, FILM COATED ORAL at 05:46

## 2023-11-02 RX ADMIN — POTASSIUM CHLORIDE 40 MEQ: 1500 TABLET, EXTENDED RELEASE ORAL at 18:09

## 2023-11-02 RX ADMIN — DOCUSATE SODIUM 50 MG AND SENNOSIDES 8.6 MG 2 TABLET: 8.6; 5 TABLET, FILM COATED ORAL at 05:47

## 2023-11-02 RX ADMIN — PANTOPRAZOLE SODIUM 40 MG: 40 INJECTION, POWDER, FOR SOLUTION INTRAVENOUS at 05:56

## 2023-11-02 RX ADMIN — ROTIGOTINE 4 MG: 4 PATCH, EXTENDED RELEASE TRANSDERMAL at 18:21

## 2023-11-02 RX ADMIN — POTASSIUM CHLORIDE 40 MEQ: 1500 TABLET, EXTENDED RELEASE ORAL at 05:46

## 2023-11-02 RX ADMIN — LEVOTHYROXINE, LIOTHYRONINE 60 MG: 19; 4.5 TABLET ORAL at 06:12

## 2023-11-02 RX ADMIN — DOCUSATE SODIUM 100 MG: 100 CAPSULE, LIQUID FILLED ORAL at 18:09

## 2023-11-02 RX ADMIN — POTASSIUM CHLORIDE 40 MEQ: 1500 TABLET, EXTENDED RELEASE ORAL at 13:18

## 2023-11-02 RX ADMIN — METOPROLOL SUCCINATE 12.5 MG: 25 TABLET, EXTENDED RELEASE ORAL at 05:46

## 2023-11-02 RX ADMIN — PANTOPRAZOLE SODIUM 40 MG: 40 INJECTION, POWDER, FOR SOLUTION INTRAVENOUS at 18:09

## 2023-11-02 RX ADMIN — DOCUSATE SODIUM 50 MG AND SENNOSIDES 8.6 MG 2 TABLET: 8.6; 5 TABLET, FILM COATED ORAL at 18:09

## 2023-11-02 RX ADMIN — TAMSULOSIN HYDROCHLORIDE 0.4 MG: 0.4 CAPSULE ORAL at 08:22

## 2023-11-02 RX ADMIN — HYDROCORTISONE SODIUM SUCCINATE 50 MG: 100 INJECTION, POWDER, FOR SOLUTION INTRAMUSCULAR; INTRAVENOUS at 05:56

## 2023-11-02 ASSESSMENT — ENCOUNTER SYMPTOMS
DIARRHEA: 0
FEVER: 0
SINUS PAIN: 0
SHORTNESS OF BREATH: 0
ABDOMINAL PAIN: 0
NAUSEA: 0
FALLS: 1
CHILLS: 0
VOMITING: 0
MYALGIAS: 1
COUGH: 0

## 2023-11-02 ASSESSMENT — FIBROSIS 4 INDEX: FIB4 SCORE: 1.22

## 2023-11-02 ASSESSMENT — PAIN DESCRIPTION - PAIN TYPE: TYPE: ACUTE PAIN

## 2023-11-02 NOTE — PROGRESS NOTES
[facility] Medicine Daily Progress Note    Date of Service  11/1/2023    Chief Complaint  Elias Casanova is a 82 y.o. male admitted 10/26/2023 with NSTEMI, rhabdomyolysis     Hospital Course  This is a  82 y.o. male who was transferred from outside facility 10/26/2023 with rhabdomyolysis and elevated troponin.  PMH of A-fib on anticoagulation, Parkinson disease, hypertension, pulmonary hypertension  He was found on the ground, says he had fallen out of bed, he does not believe he lost consciousness.  He was too weak to get back up and thinks he was on the ground for about 12 hours or more.  He is found to have rhabdomyolysis at outside facility with CPK of 1100. He was also found to have elevated troponin and was sent here for high level of care     Interval Problem Update  Patient seen and examined, weak tirred, denie chest pain, troponin trending up, and his BP is also low. I held his metoprolol today as his SBP went as low as 70.   His CPK also trending up so he has been started on IV fluid, but will need to be carefull as patient has already lower extremity edema.  Cardiology was consulted for concern for NSTEMI and he has been started on heparin drip, an echo has been ordered   His sister is at bed side and we discussed with his sister and patient regarding goals of care code status has been changed to DNR/DNI   Initially declined echo but sister said will talk and try to convince him to have the echo   Prognosis is really guarded at this time   Restarted back his parkinson medication   10/28: Patient seen and examined, daughter at bedside updated on gals of care.  Cardiology following case discussed with Dr. Kelly , echo pending, troponin up to around 1600. Cont on heparin drip.  Hypotension, he has h/o of adrenal insufficiency with give IV hydrocortisone for stress dose steroids  CPK around 1100 today close monitoring for renal failure   Prognosis guarded   10/29: Patient seen and examined, today, his  hemoglobin has dropped down to 6.5 this AM. Will give a unit of RBC, stoping heparin drip and also holding aspirin for now.  Not on IV fluid but on lasix now as patient is fluid overload.  A rapid was called overnight, CXr concern for pneumonia started on IV abx.  Cardiology following , echo pending case discussed appreciate rec.   Monitor his Renal failure and also CPK   Prognosis Guarded   10/30: Patient seen and examined, he had a low hemoglobin of 6.5 yesterday and was given a unit of blood and this brought hsi hemoglobin up to 9.4 so unclear if that 6.5 was an error.  But have ordered for FOBT to r/o any GI bleed. If that positive will consider consulting GI  Seen by cardiology again today and as per cardiology no antiplatelet or  OAC until his anemia source clarified.  I have  also consulted palliative team  to discuss goals of care    TAKING OVER CARE  10/31  Examined in his inpatient room, he was sitting up in bedside chair.   He is afebrile, HR  SBP  SpO2 % on room air.   Leukocytosis persists, up to 14.8, Hgb fluctuating 7-10 over the last couple days, no obvious bleeding on plavix, constipated, getting miralax. BMP potassium 3.0, Bicarb 17, BUN 43 Scr 1.54. 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 Will talk with patient and see if he would want to pursue EGD/colonoscopy if offered.     11/1  Examined in his inpatient room.  He is afebrile, pulse overnight was up to 160s, not sustained however tachycardic, systolic blood pressure down to the 80s.  Metoprolol was unable to be given since the hypotension.  He is net -2 L and appears dry this morning, electrolytes replaced, IV fluid bolus given.  Bowel regimen increased.  Hoping to  improve blood pressure with the IV fluids with hopes of restarting metoprolol to better control A-fib with RVR.  I had a long discussion with the patient and his sister at bedside, patient is agreeable to SNF when medically cleared.  No obvious GI bleeding, we discussed GI consultation he is not particularly interested in pursuing colonoscopy at this time, particularly given weak evidence suggesting possible ongoing GI bleed.  We will follow-up with oncology/endocrinologist as outpatient to discuss his light chain abnormalities.    Remains high risk of clinical deterioration and high risk of mortality, prognosis guarded.    I have discussed this patient's plan of care and discharge plan at IDT rounds today with Case Management, Nursing, Nursing leadership, and other members of the IDT team.    Consultants/Specialty  cardiology    Code Status  DNAR/DNI    Disposition  The patient is not medically cleared for discharge to home or a post-acute facility.      I have placed the appropriate orders for post-discharge needs.    Review of Systems  Review of Systems   Constitutional:  Negative for chills and fever.   HENT:  Negative for congestion, nosebleeds and sinus pain.    Respiratory:  Negative for cough and shortness of breath.    Cardiovascular:  Negative for chest pain.   Gastrointestinal:  Negative for abdominal pain, diarrhea, nausea and vomiting.   Genitourinary:  Negative for dysuria and urgency.   Musculoskeletal:  Positive for falls and myalgias.   All other systems reviewed and are negative.       Physical Exam  Temp:  [36.1 °C (97 °F)-37 °C (98.6 °F)] 36.7 °C (98.1 °F)  Pulse:  [] 105  Resp:  [12-18] 18  BP: ()/() 127/86  SpO2:  [94 %-100 %] 97 %    Physical Exam  Constitutional:       General: He is not in acute distress.     Appearance: Normal appearance. He is not toxic-appearing.   HENT:      Head: Normocephalic and atraumatic.      Mouth/Throat:      Mouth: Mucous membranes are dry.       Pharynx: Oropharynx is clear. No oropharyngeal exudate or posterior oropharyngeal erythema.   Eyes:      General: No scleral icterus.  Cardiovascular:      Rate and Rhythm: Normal rate and regular rhythm.      Pulses: Normal pulses.      Heart sounds: Normal heart sounds. No murmur heard.  Pulmonary:      Effort: Pulmonary effort is normal. No respiratory distress.      Breath sounds: Normal breath sounds. No wheezing.   Abdominal:      Palpations: Abdomen is soft.      Tenderness: There is no abdominal tenderness.   Musculoskeletal:         General: No swelling or tenderness. Normal range of motion.      Cervical back: Normal range of motion.   Skin:     General: Skin is warm and dry.   Neurological:      General: No focal deficit present.      Mental Status: He is alert and oriented to person, place, and time. Mental status is at baseline.   Psychiatric:         Mood and Affect: Mood normal.         Behavior: Behavior normal.         Fluids    Intake/Output Summary (Last 24 hours) at 11/1/2023 2322  Last data filed at 11/1/2023 1456  Gross per 24 hour   Intake 480 ml   Output 1700 ml   Net -1220 ml         Laboratory  Recent Labs     10/31/23  0114 10/31/23  0936 10/31/23  1653 11/01/23  0107 11/01/23  0917 11/01/23  1720   WBC 13.1* 14.8*  --  12.7*  --   --    RBC 2.80* 3.53*  --  2.89*  --   --    HEMOGLOBIN 7.7* 9.7*   < > 8.0* 8.0* 8.3*   HEMATOCRIT 25.4* 31.6*   < > 26.2* 26.1* 27.5*   MCV 90.7 89.5  --  90.7  --   --    MCH 27.5 27.5  --  27.7  --   --    MCHC 30.3* 30.7*  --  30.5*  --   --    RDW 54.4* 53.1*  --  53.5*  --   --    PLATELETCT 339 423  --  355  --   --    MPV 10.3 10.5  --  10.4  --   --     < > = values in this interval not displayed.       Recent Labs     10/31/23  0114 11/01/23  0107 11/01/23  2103   SODIUM 136 138 137   POTASSIUM 3.0* 3.3* 4.4   CHLORIDE 106 105 104   CO2 17* 19* 19*   GLUCOSE 98 80 107*   BUN 43* 48* 37*   CREATININE 1.54* 1.56* 1.23   CALCIUM 7.7* 8.0* 8.9                          Imaging  DX-ESOPHAGUS - [id number]   Final Result      EC-ECHOCARDIOGRAM COMPLETE W/O CONT   Final Result      DX-CHEST-PORTABLE (1 VIEW)   Final Result      1.  Hypoinflation with elevated LEFT hemidiaphragm and LEFT lung base atelectasis or pneumonia.   2.  Probable mild edema.   3.  No pneumothorax.           Assessment/Plan  * NSTEMI (non-ST elevated myocardial infarction) (HCC)- (present on admission)  Assessment & Plan  Troponin significantly elevated at outside facility and trending up, in the 600s here  EKG from transferring facility not concerning for acute ischemia, repeat EKG here ordered  Patient fell out of bed and was unable to get up, he does have a history of Parkinson's disease.  Unclear if he had a coronary event that resulted in him falling  I have consulted cardiology and discussed case with Dr. Kelly   Will continue on heparin drip for now, echo has been ordered since troponin trending up   No cardiac cath at this time as patient not a good candidate for it.  He declined echo initially now family at bedside and we had discussion, with patient sister who will discuss with him again to see if she can convince him to have the echo   close monitoring on tele   Echo showing ischemia/ MI on the LAD territory   Cardiology following  As per cardiology to hold off on his antiplatelet or OAC forhis A.fibb due to his anemia  I have ordered a FOBT to r/o GI bleed     Advance care planning  Assessment & Plan  His sister is now at bedside and we had a long discussion with patient and his sister regarding goals of care.  Since patient has declined echo today we discussed regarding code status and also comfort care and hospice  Will change patient to DNR/DNI for now , no hospice or comfort care  Sister will talk and try to convince him to see if he is willing to have the echo   More then 18 min spent on discussion for goals of care alone       Anemia- (present on admission)  Assessment &  Plan  Baseline hemoglobin 12-14.  Was 8.9 on presentation.    His OAC and antiplatelet have been held. I have ordered for FOBT to r/o GI bleed if that positive will consult GI   His hemoglobin yesterday was 6.5 and got 1 unit of RBC which brought his hemoglobin up to 9.4 uncleare if the 6.5 was an eror or possible due to his fluid overload   I have ordered for FOBT if that is positive will consult GI     Non-traumatic rhabdomyolysis- (present on admission)  Assessment & Plan  CPK continues to trend up starting IV fluid but need to be careful  as patient is edematous   This was also discussed with family     Persistent atrial fibrillation (HCC)- (present on admission)  Assessment & Plan  Follows with cardiology, holding metoprolol as his BP is low   Holding pradaxa as he is on heparin drip     Parkinson disease- (present on admission)  Assessment & Plan  Cont outpatient regimen   PT, OT ordered       VTE prophylaxis: scd    The very real possibilty of a deterioration of this patient's condition required the highest level of my preparedness for sudden, emergent intervention.  I provided  services, which included medication orders, frequent reevaluations of the patient's condition and response to treatment, ordering and reviewing test results, and discussing the case with various consultants.  The  time associated with the care of the patient was 52  minutes.             I have performed a physical exam and reviewed and updated ROS and Plan today (11/1/2023). In review of yesterday's note (10/31/2023), there are no changes except as documented above.

## 2023-11-02 NOTE — PROGRESS NOTES
Rapid Response Summary     Rapid response called at 2057 for: Tachycardia     VS: Tachycardia  and Hypertensive  (See Vitals Flowsheet)  Additional info: metoprolol XL held for 4 days, patient has transient afib RVR, patient sustaining afib RVR with rates between 140-160, -160s  MD Paged: Nate   Interventions:    Imaging/Test: EKG    Labs: BMP. Mg   Medications: metoprolol XL 12.5 mg   Other: N/A  Disposition: Improved with rapid response team interventions. Primary RN updated on plan of care. Rapid team will continue to follow the patient.       Per Nate APRN patient to receive previously held Metoprolol XL 12.5 mg, if no effect on rate within 30 minutes to give 5 mg IV metoprolol. RRT to follow.

## 2023-11-02 NOTE — CARE PLAN
The patient is Stable - Low risk of patient condition declining or worsening    Shift Goals  Clinical Goals: monitor BP and HR  Patient Goals: rest, nutrition  Family Goals: AYDEN    Progress made toward(s) clinical / shift goals:      Problem: Skin Integrity  Goal: Skin integrity is maintained or improved  Outcome: Progressing     Problem: Fall Risk  Goal: Patient will remain free from falls  Outcome: Progressing     Problem: Nutrition  Goal: Patient's nutritional and fluid intake will be adequate or improve  Outcome: Progressing       Patient is not progressing towards the following goals:

## 2023-11-02 NOTE — DISCHARGE PLANNING
"RNCM received update from Charge RN, Jany, reporting she received a call from \"[facility] requesting an update,\" however, they did not leave a name or number.  RN BRIELLE called [facility], [phone] to inquire - s/w Leigh - they reported they do have an Aps report. Please call Leigh at [phone] with DC date and plan - so they can follow post DC to home on his home environment.   CM is unaware of an APS case - chart reviewed and noted per H&P -  (noted mispelling) a \"welfare\" check was called on him \"due to missing his doctors appointment and sounding off when his doctor's office called him.  He was found on the ground, says he had fallen out of bed, he does not believe he lost consciousness.  He was too weak to get back up and thinks he was on the ground for about 12 hours or more. \"   Per CM admission assessment: Pt lives with two roommates. Pt uses a walker at baseline. Pt reported that he his sister, two friends, and two roommates are good support. Pt denies any SA or MH concerns. 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  Current DC plan is for SNF rehab at [facility] once medically cleared  "

## 2023-11-02 NOTE — PROGRESS NOTES
Monitor summary:        Rhythm: Afib  Rate: ,  150-160 sustained for 1 hour (Rapid called)  Ectopy: (R)PVC  Measurements: /.07/.        12hr chart check

## 2023-11-02 NOTE — THERAPY
"Speech Language Pathology   Daily Treatment     Patient Name: Elias Casanova  AGE:  82 y.o., SEX:  male  Medical Record #: [id number]  Date of Service: 11/2/2023      Precautions:  Precautions: Fall Risk, Non Weight Bearing Right Upper Extremity, Swallow Precautions         Subjective  RN cleared pt for dysphagia management. Pt received, awake, alert, upright in bed. Pt consuming brownie, endorsed consuming meal trays/snacks has been going okay. Wet vocal quality appreciated upon arrival, improved with cued cough and re-swallow.       Assessment  Pt independently recalled swallow-strengthening exercises, including effortful swallow, BOT retraction, and chin tuck against resistance. Endorsed intermittently implementing effortful swallow during meals, stated \"that takes a little more nerve,\" referring to being fearful of choking. Provided education regarding exercises including instructions and purpose, pt stated understanding. Effortful swallow completed x10 with good accuracy and min cues. BOT retraction completed x10 with good accuracy and min cues. Chin tuck against resistance completed x5 with good accuracy. Pt endorsed preference for CTAR exercise. Provided education regarding how and when to implement exercises independently, as well as implementing cough and re-swallow during oral intake, pt stated understanding.      Clinical Impressions  Patient presents with severe oropharyngeal dysphagia, per MBSS 10/31. Recommend continuation of regular solids and thin liquids diet despite known risk with use of strategies to maximize pulmonary hygiene including diligent oral care and mobility as pt is able.       Recommendations  Treatment Completed: Dysphagia Treatment     Dysphagia Treatment  Diet Consistency: Regular solids/thin liquids  Instrumentation: None indicated at this time  Medication: Crush with applesauce, as appropriate, Crush with pudding/puree, as appropriate  Supervision: Assist with meal tray set up, " "Distant supervision - check on patient 2-3 times per meal  Positioning: Fully upright and midline during oral intake, Meals sitting upright in a chair, as tolerated  Risk Management : Small bites/sips, Slow rate of intake, Physical mobility, as tolerated  Oral Care:  (At least TID after meals)      SLP Treatment Plan  Treatment Plan: Dysphagia Treatment  SLP Frequency: 4x Per Week  Estimated Duration: Until Therapy Goals Met      Anticipated Discharge Needs  Discharge Recommendations: Recommend post-acute placement for additional speech therapy services prior to discharge home  Therapy Recommendations Upon DC: Dysphagia Training, Patient / Family / Caregiver Education, Community Re-Integration      Patient / Family Goals  Patient / Family Goal #1: \"It gets stuck sometimes.\"  Goal #1 Outcome: Progressing as expected  Short Term Goals  Short Term Goal # 1: Pt will complete MBSS w SLP to further evaluate swallow function and inform POC.  Goal Outcome # 1: Goal met, new goal added  Short Term Goal # 1 B : Pt will consume diet of RG/TN given strategies to minimize risk and maximize pulmonary hygiene.  Goal Outcome  # 1 B: Progressing as expected  Short Term Goal # 2: Pt will complete exercises targeting hyolaryngeal movement, LVC, BOT retaction, and pharyngeal constriction/shortening x40 in a session given mod cues from SLP.  Goal Outcome # 2 : Progressing as expected      VENTURA Baker  "

## 2023-11-02 NOTE — THERAPY
Occupational Therapy Contact Note    Patient Name: Elias Casanova  Age:  82 y.o., Sex:  male  Medical Record #: [id number]  Today's Date: 11/2/2023    Pt currently with sustained afib for ~1hr and pt with elevated BP. Will hold OT session and reattempt as appropriate/able.

## 2023-11-02 NOTE — PROGRESS NOTES
Bedside report received from day shift RN. Assumed care of pt. Pt awake, laying in bed. A/Ox4, VSS. No concerns, complaints or distress. Pt educated to call before getting out of bed. POC reviewed and white board updated. Tele box on. Afib 109 on the monitor. Call light in reach. Bed locked in lowest position with 2 upper bed rails up. Bed alarm on.

## 2023-11-02 NOTE — PROGRESS NOTES
NOC CROSSCOVER    RN reported tachycardia sustaining in 150-160's.  EKG shows Afib with RVR.  Patient does have history of afib.  Cardiology has been consulted and recommended restarting metoprolol when BP would tolerate.  BP is elevated this evening at 164/113. Unheld metoprolol and also ordered PRN IV metoprolol if needed.  RN will try PO first and give PRN IV if PO is ineffective.      SALAZAR Grant

## 2023-11-03 LAB
ANION GAP SERPL CALC-SCNC: 7 MMOL/L (ref 7–16)
BASOPHILS # BLD AUTO: 0.1 % (ref 0–1.8)
BASOPHILS # BLD: 0.01 K/UL (ref 0–0.12)
BUN SERPL-MCNC: 33 MG/DL (ref 8–22)
CALCIUM SERPL-MCNC: 8.2 MG/DL (ref 8.5–10.5)
CHLORIDE SERPL-SCNC: 107 MMOL/L (ref 96–112)
CO2 SERPL-SCNC: 24 MMOL/L (ref 20–33)
CREAT SERPL-MCNC: 0.87 MG/DL (ref 0.5–1.4)
EOSINOPHIL # BLD AUTO: 0.45 K/UL (ref 0–0.51)
EOSINOPHIL NFR BLD: 4 % (ref 0–6.9)
ERYTHROCYTE [DISTWIDTH] IN BLOOD BY AUTOMATED COUNT: 55.5 FL (ref 35.9–50)
GFR SERPLBLD CREATININE-BSD FMLA CKD-EPI: 87 ML/MIN/1.73 M 2
GLUCOSE SERPL-MCNC: 82 MG/DL (ref 65–99)
HCT VFR BLD AUTO: 27.3 % (ref 42–52)
HGB BLD-MCNC: 8.1 G/DL (ref 14–18)
IMM GRANULOCYTES # BLD AUTO: 0.05 K/UL (ref 0–0.11)
IMM GRANULOCYTES NFR BLD AUTO: 0.4 % (ref 0–0.9)
LYMPHOCYTES # BLD AUTO: 0.71 K/UL (ref 1–4.8)
LYMPHOCYTES NFR BLD: 6.3 % (ref 22–41)
MAGNESIUM SERPL-MCNC: 2 MG/DL (ref 1.5–2.5)
MCH RBC QN AUTO: 27.3 PG (ref 27–33)
MCHC RBC AUTO-ENTMCNC: 29.7 G/DL (ref 32.3–36.5)
MCV RBC AUTO: 91.9 FL (ref 81.4–97.8)
MONOCYTES # BLD AUTO: 0.93 K/UL (ref 0–0.85)
MONOCYTES NFR BLD AUTO: 8.2 % (ref 0–13.4)
NEUTROPHILS # BLD AUTO: 9.13 K/UL (ref 1.82–7.42)
NEUTROPHILS NFR BLD: 81 % (ref 44–72)
NRBC # BLD AUTO: 0 K/UL
NRBC BLD-RTO: 0 /100 WBC (ref 0–0.2)
PLATELET # BLD AUTO: 338 K/UL (ref 164–446)
PMV BLD AUTO: 10.3 FL (ref 9–12.9)
POTASSIUM SERPL-SCNC: 4.9 MMOL/L (ref 3.6–5.5)
RBC # BLD AUTO: 2.97 M/UL (ref 4.7–6.1)
SODIUM SERPL-SCNC: 138 MMOL/L (ref 135–145)
WBC # BLD AUTO: 11.3 K/UL (ref 4.8–10.8)

## 2023-11-03 PROCEDURE — C9113 INJ PANTOPRAZOLE SODIUM, VIA: HCPCS | Performed by: INTERNAL MEDICINE

## 2023-11-03 PROCEDURE — 700111 HCHG RX REV CODE 636 W/ 250 OVERRIDE (IP): Performed by: STUDENT IN AN ORGANIZED HEALTH CARE EDUCATION/TRAINING PROGRAM

## 2023-11-03 PROCEDURE — 700111 HCHG RX REV CODE 636 W/ 250 OVERRIDE (IP): Performed by: INTERNAL MEDICINE

## 2023-11-03 PROCEDURE — 97530 THERAPEUTIC ACTIVITIES: CPT

## 2023-11-03 PROCEDURE — 97535 SELF CARE MNGMENT TRAINING: CPT

## 2023-11-03 PROCEDURE — 83735 ASSAY OF MAGNESIUM: CPT

## 2023-11-03 PROCEDURE — A9270 NON-COVERED ITEM OR SERVICE: HCPCS | Performed by: INTERNAL MEDICINE

## 2023-11-03 PROCEDURE — 80048 BASIC METABOLIC PNL TOTAL CA: CPT

## 2023-11-03 PROCEDURE — 36415 COLL VENOUS BLD VENIPUNCTURE: CPT

## 2023-11-03 PROCEDURE — 700102 HCHG RX REV CODE 250 W/ 637 OVERRIDE(OP): Performed by: INTERNAL MEDICINE

## 2023-11-03 PROCEDURE — A9270 NON-COVERED ITEM OR SERVICE: HCPCS | Performed by: STUDENT IN AN ORGANIZED HEALTH CARE EDUCATION/TRAINING PROGRAM

## 2023-11-03 PROCEDURE — 770020 HCHG ROOM/CARE - TELE (206)

## 2023-11-03 PROCEDURE — 700102 HCHG RX REV CODE 250 W/ 637 OVERRIDE(OP): Performed by: STUDENT IN AN ORGANIZED HEALTH CARE EDUCATION/TRAINING PROGRAM

## 2023-11-03 PROCEDURE — 99233 SBSQ HOSP IP/OBS HIGH 50: CPT | Performed by: STUDENT IN AN ORGANIZED HEALTH CARE EDUCATION/TRAINING PROGRAM

## 2023-11-03 PROCEDURE — 85025 COMPLETE CBC W/AUTO DIFF WBC: CPT

## 2023-11-03 RX ORDER — METOPROLOL SUCCINATE 25 MG/1
12.5 TABLET, EXTENDED RELEASE ORAL DAILY
Status: DISCONTINUED | OUTPATIENT
Start: 2023-11-03 | End: 2023-11-10 | Stop reason: HOSPADM

## 2023-11-03 RX ORDER — OMEPRAZOLE 20 MG/1
20 CAPSULE, DELAYED RELEASE ORAL 2 TIMES DAILY
Status: DISCONTINUED | OUTPATIENT
Start: 2023-11-03 | End: 2023-11-10 | Stop reason: HOSPADM

## 2023-11-03 RX ADMIN — DOCUSATE SODIUM 50 MG AND SENNOSIDES 8.6 MG 2 TABLET: 8.6; 5 TABLET, FILM COATED ORAL at 05:41

## 2023-11-03 RX ADMIN — DOCUSATE SODIUM 100 MG: 100 CAPSULE, LIQUID FILLED ORAL at 05:41

## 2023-11-03 RX ADMIN — FINASTERIDE 5 MG: 5 TABLET, FILM COATED ORAL at 05:42

## 2023-11-03 RX ADMIN — PREDNISONE 5 MG: 5 TABLET ORAL at 05:41

## 2023-11-03 RX ADMIN — POTASSIUM CHLORIDE 40 MEQ: 1500 TABLET, EXTENDED RELEASE ORAL at 11:44

## 2023-11-03 RX ADMIN — PANTOPRAZOLE SODIUM 40 MG: 40 INJECTION, POWDER, FOR SOLUTION INTRAVENOUS at 05:43

## 2023-11-03 RX ADMIN — DOCUSATE SODIUM 50 MG AND SENNOSIDES 8.6 MG 2 TABLET: 8.6; 5 TABLET, FILM COATED ORAL at 18:14

## 2023-11-03 RX ADMIN — POTASSIUM CHLORIDE 40 MEQ: 1500 TABLET, EXTENDED RELEASE ORAL at 05:41

## 2023-11-03 RX ADMIN — POTASSIUM CHLORIDE 40 MEQ: 1500 TABLET, EXTENDED RELEASE ORAL at 18:14

## 2023-11-03 RX ADMIN — METOPROLOL SUCCINATE 12.5 MG: 25 TABLET, EXTENDED RELEASE ORAL at 05:41

## 2023-11-03 RX ADMIN — OMEPRAZOLE 20 MG: 20 CAPSULE, DELAYED RELEASE ORAL at 18:14

## 2023-11-03 RX ADMIN — ROTIGOTINE 4 MG: 4 PATCH, EXTENDED RELEASE TRANSDERMAL at 18:14

## 2023-11-03 RX ADMIN — DOCUSATE SODIUM 100 MG: 100 CAPSULE, LIQUID FILLED ORAL at 18:14

## 2023-11-03 RX ADMIN — LEVOTHYROXINE, LIOTHYRONINE 60 MG: 19; 4.5 TABLET ORAL at 06:00

## 2023-11-03 RX ADMIN — TAMSULOSIN HYDROCHLORIDE 0.4 MG: 0.4 CAPSULE ORAL at 08:40

## 2023-11-03 ASSESSMENT — COGNITIVE AND FUNCTIONAL STATUS - GENERAL
MOBILITY SCORE: 10
MOVING FROM LYING ON BACK TO SITTING ON SIDE OF FLAT BED: UNABLE
WALKING IN HOSPITAL ROOM: A LOT
STANDING UP FROM CHAIR USING ARMS: A LITTLE
TURNING FROM BACK TO SIDE WHILE IN FLAT BAD: A LOT
CLIMB 3 TO 5 STEPS WITH RAILING: TOTAL
MOVING TO AND FROM BED TO CHAIR: UNABLE
SUGGESTED CMS G CODE MODIFIER MOBILITY: CL

## 2023-11-03 ASSESSMENT — PAIN DESCRIPTION - PAIN TYPE: TYPE: ACUTE PAIN

## 2023-11-03 ASSESSMENT — FIBROSIS 4 INDEX: FIB4 SCORE: 1.22

## 2023-11-03 NOTE — PROGRESS NOTES
Hospital Medicine Daily Progress Note    Date of Service  11/2/2023    Chief Complaint  Elias Casanova is a 82 y.o. male admitted 10/26/2023 with NSTEMI, rhabdomyolysis     Hospital Course  This is a  82 y.o. male who was transferred from outside facility 10/26/2023 with rhabdomyolysis and elevated troponin.  PMH of A-fib on anticoagulation, Parkinson disease, hypertension, pulmonary hypertension  He was found on the ground, says he had fallen out of bed, he does not believe he lost consciousness.  He was too weak to get back up and thinks he was on the ground for about 12 hours or more.  He is found to have rhabdomyolysis at outside facility with CPK of 1100. He was also found to have elevated troponin and was sent here for high level of care   denies chest pain, troponin trending up, and his BP is low. metoprolol as his SBP went as low as 70.   His CPK also trended up so he has been started on IV fluid, but will need to be carefull as patient has already lower extremity edema.  Cardiology was consulted for NSTEMI and he was been started on heparin drip, an echo ordered   His sister is at bed side and discussed patient regarding goals of care, code status changed to DNR/DNI   Restarted back his parkinson medication   troponin up to around 1600. Cont on heparin drip.  Hypotension, he has h/o of adrenal insufficiency, transitioned to IV hydrocortisone for stress dose steroids  hemoglobin has dropped down to 6.5 this AM, received a unit of RBC, stoping heparin drip and also holding aspirin for now.  Started on lasix as patient was fluid overload.  A rapid was called, CXr concern for pneumonia started on IV abx.  given a unit of blood and this brought his hemoglobin up to 9.4 so unclear if that 6.5 was an error.  ordered for FOBT to r/o any GI bleed, he was constipated for a few days, increasing bowel regimen and he was able to have brown smears, no melena or red blood in stool.   per cardiology no antiplatelet or   OAC until his anemia source clarified. Hgb has been jumping from 6.5 to the 10's since arrival with no clear pattern.  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   On 11/1, patient had pulse overnight up to 160s, not sustained however tachycardic, systolic blood pressure down to the 80s.  Metoprolol was unable to be given since the hypotension.  net -2 L and appears dry, electrolytes replaced, IV fluid bolus given.  Bowel regimen increased.  Hoping to improve blood pressure with the IV fluids with hopes of restarting metoprolol to better control A-fib with RVR.  I had a long discussion with the patient and his sister at bedside, patient is agreeable to SNF when medically cleared.  No obvious GI bleeding, we discussed GI consultation he is not particularly interested in pursuing colonoscopy at this time, particularly given weak evidence suggesting possible ongoing GI bleed.  will follow-up with oncology/endocrinologist as outpatient to discuss his light chain abnormalities.  Remains high risk of clinical deterioration and high risk of mortality, prognosis guarded.    Interval Problem Update  11/2  Overnight heart rate went up to the 160s, sustained, started on p.o. metoprolol 12.5 as well as IV metoprolol push.  Given overnight and this morning heart rate was down improved to 105, this morning blood pressure came down to the 70s and 80s systolic for a bit.  After couple hours heart rate improved from 80s to low 100s systolic blood pressure improved to the upper 80s low 90s maps greater than 65.  We will continue with the metoprolol 12.5 mg daily in hopes that blood pressure will tolerate so that he can control his heart rate.  Volume status is difficult, bilateral lower extremity edema has been pretty chronic but now  worse, hesitant to give IV fluids.  Did not receive any today and his blood pressure improved as did heart rate with just a bit of time.  Once he shows 24 hours stable vitals heart rate and blood pressure can proceed with discharge. Patient and sister updated at bedside.     I have discussed this patient's plan of care and discharge plan at IDT rounds today with Case Management, Nursing, Nursing leadership, and other members of the IDT team.    Consultants/Specialty  cardiology    Code Status  DNAR/DNI    Disposition  The patient is not medically cleared for discharge to home or a post-acute facility.      I have placed the appropriate orders for post-discharge needs.    Review of Systems  Review of Systems   Constitutional:  Negative for chills and fever.   HENT:  Negative for congestion, nosebleeds and sinus pain.    Respiratory:  Negative for cough and shortness of breath.    Cardiovascular:  Negative for chest pain.   Gastrointestinal:  Negative for abdominal pain, diarrhea, nausea and vomiting.   Genitourinary:  Negative for dysuria and urgency.   Musculoskeletal:  Positive for falls and myalgias.   All other systems reviewed and are negative.       Physical Exam  Temp:  [36.1 °C (97 °F)-36.7 °C (98.1 °F)] 36.6 °C (97.9 °F)  Pulse:  [] 87  Resp:  [18-24] 19  BP: ()/() 94/61  SpO2:  [93 %-98 %] 97 %    Physical Exam  Constitutional:       General: He is not in acute distress.     Appearance: Normal appearance. He is not toxic-appearing.   HENT:      Head: Normocephalic and atraumatic.      Mouth/Throat:      Mouth: Mucous membranes are dry.      Pharynx: Oropharynx is clear. No oropharyngeal exudate or posterior oropharyngeal erythema.   Eyes:      General: No scleral icterus.  Cardiovascular:      Rate and Rhythm: Tachycardia present. Rhythm irregular.      Pulses: Normal pulses.      Heart sounds: Normal heart sounds. No murmur heard.  Pulmonary:      Effort: Pulmonary effort is normal. No  respiratory distress.      Breath sounds: Normal breath sounds. No wheezing.   Abdominal:      Palpations: Abdomen is soft.      Tenderness: There is no abdominal tenderness.   Musculoskeletal:         General: No swelling or tenderness. Normal range of motion.      Cervical back: Normal range of motion.      Right lower leg: Edema present.      Left lower leg: Edema present.   Skin:     General: Skin is warm and dry.   Neurological:      General: No focal deficit present.      Mental Status: He is alert and oriented to person, place, and time. Mental status is at baseline.      Motor: Weakness (generalized) present.   Psychiatric:         Mood and Affect: Mood normal.         Behavior: Behavior normal.         Thought Content: Thought content normal.         Judgment: Judgment normal.         Fluids    Intake/Output Summary (Last 24 hours) at 11/2/2023 2305  Last data filed at 11/2/2023 1800  Gross per 24 hour   Intake 120 ml   Output 1500 ml   Net -1380 ml         Laboratory  Recent Labs     10/31/23  0936 10/31/23  1653 11/01/23  0107 11/01/23  0917 11/02/23  0121 11/02/23  0308 11/02/23  0852   WBC 14.8*  --  12.7*  --   --  10.4  --    RBC 3.53*  --  2.89*  --   --  2.74*  --    HEMOGLOBIN 9.7*   < > 8.0*   < > 8.2* 7.3* 9.1*   HEMATOCRIT 31.6*   < > 26.2*   < > 27.5* 24.7* 31.0*   MCV 89.5  --  90.7  --   --  90.1  --    MCH 27.5  --  27.7  --   --  26.6*  --    MCHC 30.7*  --  30.5*  --   --  29.6*  --    RDW 53.1*  --  53.5*  --   --  53.7*  --    PLATELETCT 423  --  355  --   --  321  --    MPV 10.5  --  10.4  --   --  10.2  --     < > = values in this interval not displayed.       Recent Labs     11/01/23  0107 11/01/23  2103 11/02/23  0308   SODIUM 138 137 138   POTASSIUM 3.3* 4.4 4.1   CHLORIDE 105 104 106   CO2 19* 19* 23   GLUCOSE 80 107* 83   BUN 48* 37* 35*   CREATININE 1.56* 1.23 1.10   CALCIUM 8.0* 8.9 8.0*                         Imaging  DX-ESOPHAGUS - [id number]   Final Result       EC-ECHOCARDIOGRAM COMPLETE W/O CONT   Final Result      DX-CHEST-PORTABLE (1 VIEW)   Final Result      1.  Hypoinflation with elevated LEFT hemidiaphragm and LEFT lung base atelectasis or pneumonia.   2.  Probable mild edema.   3.  No pneumothorax.           Assessment/Plan  * NSTEMI (non-ST elevated myocardial infarction) (HCC)- (present on admission)  Assessment & Plan  Troponin significantly elevated at outside facility and trending up, in the 600s here  EKG from transferring facility not concerning for acute ischemia, repeat EKG here ordered  Patient fell out of bed and was unable to get up, he does have a history of Parkinson's disease.  Unclear if he had a coronary event that resulted in him falling  I have consulted cardiology and discussed case with Dr. Kelly   Will continue on heparin drip for now, echo has been ordered since troponin trending up   No cardiac cath at this time as patient not a good candidate for it.  He declined echo initially now family at bedside and we had discussion, with patient sister who will discuss with him again to see if she can convince him to have the echo   close monitoring on tele   Echo showing ischemia/ MI on the LAD territory   Cardiology following  As per cardiology to hold off on his antiplatelet or OAC forhis A.fibb due to his anemia  I have ordered a FOBT to r/o GI bleed     Advance care planning  Assessment & Plan  His sister is now at bedside and we had a long discussion with patient and his sister regarding goals of care.  Since patient has declined echo today we discussed regarding code status and also comfort care and hospice  Will change patient to DNR/DNI for now , no hospice or comfort care  Sister will talk and try to convince him to see if he is willing to have the echo   More then 18 min spent on discussion for goals of care alone       Anemia- (present on admission)  Assessment & Plan  Baseline hemoglobin 12-14.  Was 8.9 on presentation.    His OAC and  antiplatelet have been held. I have ordered for FOBT to r/o GI bleed if that positive will consult GI   His hemoglobin yesterday was 6.5 and got 1 unit of RBC which brought his hemoglobin up to 9.4 uncleare if the 6.5 was an eror or possible due to his fluid overload   I have ordered for FOBT if that is positive will consult GI     Non-traumatic rhabdomyolysis- (present on admission)  Assessment & Plan  CPK continues to trend up starting IV fluid but need to be careful  as patient is edematous   This was also discussed with family     Persistent atrial fibrillation (HCC)- (present on admission)  Assessment & Plan  Follows with cardiology, holding metoprolol as his BP is low   Holding pradaxa as he is on heparin drip     Parkinson disease- (present on admission)  Assessment & Plan  Cont outpatient regimen   PT, OT ordered       VTE prophylaxis: scd    The very real possibilty of a deterioration of this patient's condition required the highest level of my preparedness for sudden, emergent intervention.  I provided  services, which included medication orders, frequent reevaluations of the patient's condition and response to treatment, ordering and reviewing test results, and discussing the case with various consultants.  The  time associated with the care of the patient was 55  minutes.             I have performed a physical exam and reviewed and updated ROS and Plan today (11/2/2023). In review of yesterday's note (11/1/2023), there are no changes except as documented above.

## 2023-11-03 NOTE — PROGRESS NOTES
Monitor Summary:     Rhythm: A-fib/A-flutter  Rate: 82-96  Ectopy: 0  Measurements: -/0.07/0.37           12 Hour Chart Check

## 2023-11-03 NOTE — DISCHARGE PLANNING
1007  Agency/Facility Name: [facility]   Outcome: DPA left VM to verify [facility] has no open beds at all. DPA awaiting call back.

## 2023-11-03 NOTE — DISCHARGE PLANNING
Case Management Discharge Planning    Admission Date: 10/26/2023  GMLOS: 4.2  ALOS: 8    6-Clicks ADL Score: 14  6-Clicks Mobility Score: 11        Anticipated Discharge Dispo: Discharge Disposition: D/T to SNF with Medicare cert in anticipation of skilled care (03)        Action(s) Taken:  Patient discussed in rounds this AM. Patient is cleared of discharge to SNF. [facility], but do not have a bed available at this time.     Escalations Completed: None    Medically Clear: Yes    Next Steps: CM will continue to follow patient and assist with D/C planning needs.    Barriers to Discharge: Pending Placement    @1025 CM met with patient and his family member. IMM presented. Patient refused to sign IMM. In addition, patient verbalized not admitting to [facility]. Patient's family member will visit [facility] today to determine if [facility] will be SNF choice. Patient also verbalized the possibility of appealing discharge form [facility].     @5369 CM visited patient's room to determine if patient's sister has returned from visiting [facility] with SNF choice decision. Patient's sister has not returned with decision.     @6160 Volate message received form assigned Nurse. [facility] SNF choice is [facility]. CM submitted choice form to [facility].

## 2023-11-03 NOTE — CARE PLAN
The patient is Watcher - Medium risk of patient condition declining or worsening    Shift Goals  Clinical Goals: VSS, safety, BP  Patient Goals: Stay comfortable  Family Goals: delaney    Progress made toward(s) clinical / shift goals:        Problem: Knowledge Deficit - Standard  Goal: Patient and family/care givers will demonstrate understanding of plan of care, disease process/condition, diagnostic tests and medications  Outcome: Progressing     Problem: Skin Integrity  Goal: Skin integrity is maintained or improved  Outcome: Progressing     Problem: Fall Risk  Goal: Patient will remain free from falls  Outcome: Progressing     Problem: Fluid Volume  Goal: Fluid volume balance will be maintained  Outcome: Progressing     Problem: Nutrition  Goal: Patient's nutritional and fluid intake will be adequate or improve  Outcome: Progressing  Goal: Enteral nutrition will be maintained or improve  Outcome: Progressing  Goal: Enteral nutrition will be maintained or improve  Outcome: Progressing     Problem: Urinary Elimination  Goal: Establish and maintain regular urinary output  Outcome: Progressing     Problem: Bowel Elimination  Goal: Establish and maintain regular bowel function  Outcome: Progressing     Problem: Rectal Tube  Goal: Fecal output will be contained and skin will remain free from irritation  Outcome: Progressing     Problem: Mobility  Goal: Patient's capacity to carry out activities will improve  Outcome: Progressing     Problem: Self Care  Goal: Patient will have the ability to perform ADLs independently or with assistance (bathe, groom, dress, toilet and feed)  Outcome: Progressing     Problem: Communication  Goal: The ability to communicate needs accurately and effectively will improve  Outcome: Progressing     Problem: Hemodynamics  Goal: Patient's hemodynamics, fluid balance and neurologic status will be stable or improve  Outcome: Progressing     Problem: Respiratory  Goal: Patient will achieve/maintain  optimum respiratory ventilation and gas exchange  Outcome: Progressing       Patient is not progressing towards the following goals:

## 2023-11-03 NOTE — CARE PLAN
The patient is Watcher - Medium risk of patient condition declining or worsening    Shift Goals  Clinical Goals: Monitor BP/HR, monitor labs  Patient Goals: comfort  Family Goals: delaney    Progress made toward(s) clinical / shift goals:    Problem: Knowledge Deficit - Standard  Goal: Patient and family/care givers will demonstrate understanding of plan of care, disease process/condition, diagnostic tests and medications  Outcome: Progressing; pt states understanding of current plan of care.      Problem: Fall Risk  Goal: Patient will remain free from falls  Outcome: Progressing; pt remains free from falls. Fall precautions in place.

## 2023-11-03 NOTE — THERAPY
"Physical Therapy   Daily Treatment     Patient Name: Elias Casanova  Age:  82 y.o., Sex:  male  Medical Record #: [id number]  Today's Date: 11/3/2023     Precautions  Precautions: Fall Risk;Swallow Precautions  Comments: hx PD    Assessment    Pt agreeable to PT tx session, continues to demo deficits in strength, balance, activity tolerance, and overall functional mobility.  He mobilized as detailed below, improved STS with subsequent trials.  Educated pt on strategies for increasing amplitude of movements for improved balance and mobility.  Encouraged pt to sit up in chair for all meals and pivot to [facility] with nursing staff as needed.  Will continue to follow.       Plan    Treatment Plan Status: Continue Current Treatment Plan  Type of Treatment: Bed Mobility, Gait Training, Neuro Re-Education / Balance, Self Care / Home Evaluation, Stair Training, Therapeutic Activities, Therapeutic Exercise  Treatment Frequency: 4 Times per Week  Treatment Duration: Until Therapy Goals Met    DC Equipment Recommendations: Unable to determine at this time  Discharge Recommendations: Recommend post-acute placement for additional physical therapy services prior to discharge home     Objective     11/03/23 1518   Precautions   Precautions Fall Risk;Swallow Precautions   Comments hx PD   Pain 0 - 10 Group   Therapist Pain Assessment Post Activity Pain Same as Prior to Activity;Nurse Notified;0   Cognition    Cognition / Consciousness X   Level of Consciousness Alert   Comments Pleasant & cooperative, speech mildly difficult to understand at times.  Hypophonic   Other Treatments   Other Treatments Provided Educated pt on general concepts behind \"Big\" and \"Loud\" interventions including increaseing amplitude of movements and increased speech volume.   Balance   Sitting Balance (Static) Fair   Sitting Balance (Dynamic) Fair -   Standing Balance (Static) Poor -   Standing Balance (Dynamic) Trace +   Weight Shift Sitting Poor   Weight Shift " Standing Fair   Skilled Intervention Verbal Cuing;Tactile Cuing;Compensatory Strategies   Comments posterior lean   Bed Mobility    Supine to Sit Minimal Assist   Sit to Supine (NT, left up in chair)   Scooting Minimal Assist   Skilled Intervention Verbal Cuing;Tactile Cuing;Facilitation   Comments appears to use stiffness/rigidity to facilitate   Gait Analysis   Comments pivot to chair only   Functional Mobility   Sit to Stand Moderate Assist (min A 2nd STS)   Bed, Chair, Wheelchair Transfer Minimal Assist   Transfer Method Stand Pivot   Mobility STS x 2   Skilled Intervention Verbal Cuing;Tactile Cuing;Facilitation   Comments slow, scooting steps to turn into chair. Kyphotic posture, posterior lean   Activity Tolerance   Sitting in Chair Post session   Sitting Edge of Bed 8+ min   Standing 5-8 min   Short Term Goals    Short Term Goal # 1 pt will perform supine <> sit with SPV in 6 visits for improved independence   Goal Outcome # 1 goal not met   Short Term Goal # 2 pt will perform all functional xfrs with SPV in 6 visits for improved independence   Goal Outcome # 2 Goal not met   Short Term Goal # 3 pt will ambulate 50ft with FWW and SPV in 6 visits for improved independence   Goal Outcome # 3 Goal not met   Physical Therapy Treatment Plan   Physical Therapy Treatment Plan Continue Current Treatment Plan

## 2023-11-03 NOTE — CARE PLAN
The patient is Watcher - Medium risk of patient condition declining or worsening    Shift Goals  Clinical Goals: monitor BP, HR  Patient Goals: Comfort, up to chair for meals  Family Goals: AYDEN    Progress made toward(s) clinical / shift goals:    Problem: Knowledge Deficit - Standard  Goal: Patient and family/care givers will demonstrate understanding of plan of care, disease process/condition, diagnostic tests and medications  Outcome: Progressing     Problem: Skin Integrity  Goal: Skin integrity is maintained or improved  Outcome: Progressing     Problem: Fall Risk  Goal: Patient will remain free from falls  Outcome: Progressing     Problem: Fluid Volume  Goal: Fluid volume balance will be maintained  Outcome: Progressing     Problem: Nutrition  Goal: Patient's nutritional and fluid intake will be adequate or improve  Outcome: Progressing     Problem: Urinary Elimination  Goal: Establish and maintain regular urinary output  Outcome: Progressing     Problem: Bowel Elimination  Goal: Establish and maintain regular bowel function  Outcome: Progressing     Problem: Self Care  Goal: Patient will have the ability to perform ADLs independently or with assistance (bathe, groom, dress, toilet and feed)  Outcome: Progressing     Problem: Communication  Goal: The ability to communicate needs accurately and effectively will improve  Outcome: Progressing       Patient is not progressing towards the following goals:

## 2023-11-04 PROCEDURE — A9270 NON-COVERED ITEM OR SERVICE: HCPCS | Performed by: STUDENT IN AN ORGANIZED HEALTH CARE EDUCATION/TRAINING PROGRAM

## 2023-11-04 PROCEDURE — 700105 HCHG RX REV CODE 258: Performed by: STUDENT IN AN ORGANIZED HEALTH CARE EDUCATION/TRAINING PROGRAM

## 2023-11-04 PROCEDURE — A9270 NON-COVERED ITEM OR SERVICE: HCPCS | Performed by: INTERNAL MEDICINE

## 2023-11-04 PROCEDURE — 99233 SBSQ HOSP IP/OBS HIGH 50: CPT | Performed by: STUDENT IN AN ORGANIZED HEALTH CARE EDUCATION/TRAINING PROGRAM

## 2023-11-04 PROCEDURE — 700102 HCHG RX REV CODE 250 W/ 637 OVERRIDE(OP): Performed by: INTERNAL MEDICINE

## 2023-11-04 PROCEDURE — 700102 HCHG RX REV CODE 250 W/ 637 OVERRIDE(OP): Performed by: STUDENT IN AN ORGANIZED HEALTH CARE EDUCATION/TRAINING PROGRAM

## 2023-11-04 PROCEDURE — 770020 HCHG ROOM/CARE - TELE (206)

## 2023-11-04 PROCEDURE — 700111 HCHG RX REV CODE 636 W/ 250 OVERRIDE (IP): Performed by: STUDENT IN AN ORGANIZED HEALTH CARE EDUCATION/TRAINING PROGRAM

## 2023-11-04 RX ORDER — SODIUM CHLORIDE, SODIUM LACTATE, POTASSIUM CHLORIDE, AND CALCIUM CHLORIDE .6; .31; .03; .02 G/100ML; G/100ML; G/100ML; G/100ML
500 INJECTION, SOLUTION INTRAVENOUS ONCE
Status: COMPLETED | OUTPATIENT
Start: 2023-11-04 | End: 2023-11-04

## 2023-11-04 RX ADMIN — Medication 10 MG: at 19:42

## 2023-11-04 RX ADMIN — FINASTERIDE 5 MG: 5 TABLET, FILM COATED ORAL at 05:16

## 2023-11-04 RX ADMIN — DOCUSATE SODIUM 100 MG: 100 CAPSULE, LIQUID FILLED ORAL at 17:31

## 2023-11-04 RX ADMIN — DOCUSATE SODIUM 50 MG AND SENNOSIDES 8.6 MG 2 TABLET: 8.6; 5 TABLET, FILM COATED ORAL at 05:14

## 2023-11-04 RX ADMIN — OMEPRAZOLE 20 MG: 20 CAPSULE, DELAYED RELEASE ORAL at 05:16

## 2023-11-04 RX ADMIN — PREDNISONE 5 MG: 5 TABLET ORAL at 05:15

## 2023-11-04 RX ADMIN — POTASSIUM CHLORIDE 40 MEQ: 1500 TABLET, EXTENDED RELEASE ORAL at 05:16

## 2023-11-04 RX ADMIN — SODIUM CHLORIDE, POTASSIUM CHLORIDE, SODIUM LACTATE AND CALCIUM CHLORIDE 500 ML: 600; 310; 30; 20 INJECTION, SOLUTION INTRAVENOUS at 13:15

## 2023-11-04 RX ADMIN — TAMSULOSIN HYDROCHLORIDE 0.4 MG: 0.4 CAPSULE ORAL at 09:06

## 2023-11-04 RX ADMIN — DOCUSATE SODIUM 50 MG AND SENNOSIDES 8.6 MG 2 TABLET: 8.6; 5 TABLET, FILM COATED ORAL at 17:31

## 2023-11-04 RX ADMIN — POTASSIUM CHLORIDE 40 MEQ: 1500 TABLET, EXTENDED RELEASE ORAL at 11:48

## 2023-11-04 RX ADMIN — DOCUSATE SODIUM 100 MG: 100 CAPSULE, LIQUID FILLED ORAL at 05:16

## 2023-11-04 RX ADMIN — OMEPRAZOLE 20 MG: 20 CAPSULE, DELAYED RELEASE ORAL at 17:31

## 2023-11-04 RX ADMIN — ROTIGOTINE 4 MG: 4 PATCH, EXTENDED RELEASE TRANSDERMAL at 17:42

## 2023-11-04 RX ADMIN — POTASSIUM CHLORIDE 40 MEQ: 1500 TABLET, EXTENDED RELEASE ORAL at 17:31

## 2023-11-04 RX ADMIN — LEVOTHYROXINE, LIOTHYRONINE 60 MG: 19; 4.5 TABLET ORAL at 05:15

## 2023-11-04 ASSESSMENT — PATIENT HEALTH QUESTIONNAIRE - PHQ9
2. FEELING DOWN, DEPRESSED, IRRITABLE, OR HOPELESS: NOT AT ALL
1. LITTLE INTEREST OR PLEASURE IN DOING THINGS: NOT AT ALL
SUM OF ALL RESPONSES TO PHQ9 QUESTIONS 1 AND 2: 0

## 2023-11-04 ASSESSMENT — ENCOUNTER SYMPTOMS
MYALGIAS: 1
COUGH: 0
DIARRHEA: 0
SINUS PAIN: 0
SHORTNESS OF BREATH: 0
CHILLS: 0
FALLS: 1
VOMITING: 0
FEVER: 0
NAUSEA: 0
ABDOMINAL PAIN: 0

## 2023-11-04 ASSESSMENT — FIBROSIS 4 INDEX: FIB4 SCORE: 1.16

## 2023-11-04 NOTE — CARE PLAN
The patient is Watcher - Medium risk of patient condition declining or worsening    Shift Goals  Clinical Goals: Monitor HR/BP, Monitor Labs  Patient Goals: rest and comfort  Family Goals: AYDEN    Progress made toward(s) clinical / shift goals:    Problem: Knowledge Deficit - Standard  Goal: Patient and family/care givers will demonstrate understanding of plan of care, disease process/condition, diagnostic tests and medications  Outcome: Progressing     Problem: Skin Integrity  Goal: Skin integrity is maintained or improved  Outcome: Progressing     Problem: Fall Risk  Goal: Patient will remain free from falls  Outcome: Progressing     Problem: Fluid Volume  Goal: Fluid volume balance will be maintained  Outcome: Progressing     Problem: Nutrition  Goal: Patient's nutritional and fluid intake will be adequate or improve  Outcome: Progressing  Goal: Enteral nutrition will be maintained or improve  Outcome: Progressing  Goal: Enteral nutrition will be maintained or improve  Outcome: Progressing     Problem: Urinary Elimination  Goal: Establish and maintain regular urinary output  Outcome: Progressing     Problem: Bowel Elimination  Goal: Establish and maintain regular bowel function  Outcome: Progressing     Problem: Self Care  Goal: Patient will have the ability to perform ADLs independently or with assistance (bathe, groom, dress, toilet and feed)  Outcome: Progressing       Patient is not progressing towards the following goals:

## 2023-11-04 NOTE — PROGRESS NOTES
Monitor Summary:     Rhythm: A-fib  Rate: 77-94  Ectopy: PVC rare  Measurements: -/0.10/-           12 Hour Chart Check

## 2023-11-04 NOTE — DISCHARGE PLANNING
Case Management Discharge Planning    Admission Date: 10/26/2023  GMLOS: 4.2  ALOS: 9    6-Clicks ADL Score: 14  6-Clicks Mobility Score: 10  PT and/or OT Eval ordered: Yes  Post-acute Referrals Ordered: Yes  Post-acute Choice Obtained: Yes  Has referral(s) been sent to post-acute provider:  Yes      Anticipated Discharge Dispo: Discharge Disposition: D/T to SNF with Medicare cert in anticipation of skilled care (03)    DME Needed: No    Action(s) Taken: LMSW sent referral to [facility].     Addendum @ 1237: LMSW met with pt at bedside to discuss DC. Pt is requesting to meet with MD as pt is under the impression that he was not going to be medically cleared until Monday or Tuesday. LMSW informed pt that MD medically cleared him this morning. Pt requesting to speak with MD to understand DC before deciding if he will appeal DC as he has that right as a Medicare recipient. LMSW informed MD that pt was accepted by pt's #1 piotr, [facility].     Escalations Completed: None    Medically Clear: No    Next Steps: LMSW to follow as needed for DC plan.     Barriers to Discharge: Pending Placement    Is the patient up for discharge tomorrow: No

## 2023-11-04 NOTE — WOUND TEAM
[facility]  Inpatient Services  Wound and Skin Care Brief Evaluation    Admission Date: 10/26/2023     Last order of IP CONSULT TO WOUND CARE was found on 11/3/2023 from Hospital Encounter on 10/26/2023     HPI, PMH, SH: Reviewed    No chief complaint on file.    Diagnosis: Elevated troponin [R79.89]    Unit where seen by Wound Team: T720/00     Wound consult placed regarding sacrum. Chart and images reviewed. This clinician in to assess patient. Patient pleasant and agreeable. Sacrum with blanchable redness. New sacral offloading foam applied. Bilateral heels also assessed and found to have blanchable redness.    No pressure injuries or advanced wound care needs identified. Wound consult completed. No further follow up unless indicated and consulted.     Sacrum      Left Heel      Right Heel           PREVENTATIVE INTERVENTIONS:    Q shift Logan - performed per nursing policy  Q shift pressure point assessments - performed per nursing policy    Surface/Positioning  Reposition q 2 hours - Currently in Place  Waffle overlay  - Currently in Place    Offloading/Redistribution  Sacral offloading dressing (Silicone dressing) - Currently in Place  Heel offloading dressing (Silicone dressing) - Ordered  Heels floated with waffle overlay - Currently in Place

## 2023-11-04 NOTE — PROGRESS NOTES
Hospital Medicine Daily Progress Note    Date of Service  11/3/2023    Chief Complaint  Elias Casanova is a 82 y.o. male admitted 10/26/2023 with NSTEMI, rhabdomyolysis     Hospital Course  This is a  82 y.o. male who was transferred from outside facility 10/26/2023 with rhabdomyolysis and elevated troponin.  PMH of A-fib on anticoagulation, Parkinson disease, hypertension, pulmonary hypertension  He was found on the ground, says he had fallen out of bed, he does not believe he lost consciousness.  He was too weak to get back up and thinks he was on the ground for about 12 hours or more.  He is found to have rhabdomyolysis at outside facility with CPK of 1100. He was also found to have elevated troponin and was sent here for high level of care   denies chest pain, troponin trending up, and his BP is low. metoprolol as his SBP went as low as 70.   His CPK also trended up so he has been started on IV fluid, but will need to be carefull as patient has already lower extremity edema.  Cardiology was consulted for NSTEMI and he was been started on heparin drip, an echo ordered   His sister is at bed side and discussed patient regarding goals of care, code status changed to DNR/DNI   Restarted back his parkinson medication   troponin up to around 1600. Cont on heparin drip.  Hypotension, he has h/o of adrenal insufficiency, transitioned to IV hydrocortisone for stress dose steroids  hemoglobin has dropped down to 6.5 this AM, received a unit of RBC, stoping heparin drip and also holding aspirin for now.  Started on lasix as patient was fluid overload.  A rapid was called, CXr concern for pneumonia started on IV abx.  given a unit of blood and this brought his hemoglobin up to 9.4 so unclear if that 6.5 was an error.  ordered for FOBT to r/o any GI bleed, he was constipated for a few days, increasing bowel regimen and he was able to have brown smears, no melena or red blood in stool.   per cardiology no antiplatelet or   OAC until his anemia source clarified. Hgb has been jumping from 6.5 to the 10's since arrival with no clear pattern.  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   On 11/1, patient had pulse overnight up to 160s, not sustained however tachycardic, systolic blood pressure down to the 80s.  Metoprolol was unable to be given since the hypotension.  net -2 L and appears dry, electrolytes replaced, IV fluid bolus given.  Bowel regimen increased.  Hoping to improve blood pressure with the IV fluids with hopes of restarting metoprolol to better control A-fib with RVR.  I had a long discussion with the patient and his sister at bedside, patient is agreeable to SNF when medically cleared.  No obvious GI bleeding, we discussed GI consultation he is not particularly interested in pursuing colonoscopy at this time, particularly given weak evidence suggesting possible ongoing GI bleed.  will follow-up with oncology/endocrinologist as outpatient to discuss his light chain abnormalities.  Remains high risk of clinical deterioration and high risk of mortality, prognosis guarded.    Interval Problem Update  11/2  Overnight heart rate went up to the 160s, sustained, started on p.o. metoprolol 12.5 as well as IV metoprolol push.  Given overnight and this morning heart rate was down improved to 105, this morning blood pressure came down to the 70s and 80s systolic for a bit.  After couple hours heart rate improved from 80s to low 100s systolic blood pressure improved to the upper 80s low 90s maps greater than 65.  We will continue with the metoprolol 12.5 mg daily in hopes that blood pressure will tolerate so that he can control his heart rate.  Volume status is difficult, bilateral lower extremity edema has been pretty chronic but now  worse, hesitant to give IV fluids.  Did not receive any today and his blood pressure improved as did heart rate with just a bit of time.  Once he shows 24 hours stable vitals heart rate and blood pressure can proceed with discharge. Patient and sister updated at bedside.     11/3  Patient with improved HR and BP on low dose metoprolol. No tachycardia or hypotension today. Hgb still jumping around but no bleeding noted. Will proceed with discharge tomorrow morning if no further episodes of RVR or significant hypotension that have been occurring almost nightly for the last few days. Discussed with family at bedside.    I have discussed this patient's plan of care and discharge plan at IDT rounds today with Case Management, Nursing, Nursing leadership, and other members of the IDT team.    Consultants/Specialty  cardiology    Code Status  DNAR/DNI    Disposition  The patient is not medically cleared for discharge to home or a post-acute facility.  Anticipate discharge to: skilled nursing facility    I have placed the appropriate orders for post-discharge needs.    Review of Systems  Review of Systems   Constitutional:  Negative for chills and fever.   HENT:  Negative for congestion, nosebleeds and sinus pain.    Respiratory:  Negative for cough and shortness of breath.    Cardiovascular:  Negative for chest pain.   Gastrointestinal:  Negative for abdominal pain, diarrhea, nausea and vomiting.   Genitourinary:  Negative for dysuria and urgency.   Musculoskeletal:  Positive for falls and myalgias.   All other systems reviewed and are negative.       Physical Exam  Temp:  [36.4 °C (97.5 °F)-37.1 °C (98.8 °F)] 36.4 °C (97.5 °F)  Pulse:  [80-97] 88  Resp:  [16-20] 20  BP: ()/() 87/60  SpO2:  [94 %-99 %] 99 %    Physical Exam  Constitutional:       General: He is not in acute distress.     Appearance: Normal appearance. He is not toxic-appearing.   HENT:      Head: Normocephalic and atraumatic.      Mouth/Throat:       Mouth: Mucous membranes are moist.      Pharynx: Oropharynx is clear. No oropharyngeal exudate or posterior oropharyngeal erythema.   Eyes:      General: No scleral icterus.  Cardiovascular:      Rate and Rhythm: Normal rate. Rhythm irregular.      Pulses: Normal pulses.      Heart sounds: Normal heart sounds. No murmur heard.  Pulmonary:      Effort: Pulmonary effort is normal. No respiratory distress.      Breath sounds: Normal breath sounds. No wheezing.   Abdominal:      Palpations: Abdomen is soft.      Tenderness: There is no abdominal tenderness.   Musculoskeletal:         General: No swelling or tenderness. Normal range of motion.      Cervical back: Normal range of motion.      Right lower leg: Edema present.      Left lower leg: Edema present.   Skin:     General: Skin is warm and dry.   Neurological:      General: No focal deficit present.      Mental Status: He is alert and oriented to person, place, and time. Mental status is at baseline.      Motor: Weakness (generalized) present.   Psychiatric:         Mood and Affect: Mood normal.         Behavior: Behavior normal.         Thought Content: Thought content normal.         Judgment: Judgment normal.         Fluids    Intake/Output Summary (Last 24 hours) at 11/4/2023 0526  Last data filed at 11/3/2023 2000  Gross per 24 hour   Intake 480 ml   Output 850 ml   Net -370 ml         Laboratory  Recent Labs     11/02/23  0308 11/02/23  0852 11/03/23  0419   WBC 10.4  --  11.3*   RBC 2.74*  --  2.97*   HEMOGLOBIN 7.3* 9.1* 8.1*   HEMATOCRIT 24.7* 31.0* 27.3*   MCV 90.1  --  91.9   MCH 26.6*  --  27.3   MCHC 29.6*  --  29.7*   RDW 53.7*  --  55.5*   PLATELETCT 321  --  338   MPV 10.2  --  10.3       Recent Labs     11/01/23  2103 11/02/23  0308 11/03/23  0419   SODIUM 137 138 138   POTASSIUM 4.4 4.1 4.9   CHLORIDE 104 106 107   CO2 19* 23 24   GLUCOSE 107* 83 82   BUN 37* 35* 33*   CREATININE 1.23 1.10 0.87   CALCIUM 8.9 8.0* 8.2*                          Imaging  DX-ESOPHAGUS - [id number]   Final Result      EC-ECHOCARDIOGRAM COMPLETE W/O CONT   Final Result      DX-CHEST-PORTABLE (1 VIEW)   Final Result      1.  Hypoinflation with elevated LEFT hemidiaphragm and LEFT lung base atelectasis or pneumonia.   2.  Probable mild edema.   3.  No pneumothorax.           Assessment/Plan  * NSTEMI (non-ST elevated myocardial infarction) (HCC)- (present on admission)  Assessment & Plan  Troponin significantly elevated at outside facility and trending up, in the 600s here  EKG from transferring facility not concerning for acute ischemia, repeat EKG here ordered  Patient fell out of bed and was unable to get up, he does have a history of Parkinson's disease.  Unclear if he had a coronary event that resulted in him falling  I have consulted cardiology and discussed case with Dr. Kelly   Will continue on heparin drip for now, echo has been ordered since troponin trending up   No cardiac cath at this time as patient not a good candidate for it.  He declined echo initially now family at bedside and we had discussion, with patient sister who will discuss with him again to see if she can convince him to have the echo   close monitoring on tele   Echo showing ischemia/ MI on the LAD territory   Cardiology following  As per cardiology to hold off on his antiplatelet or OAC forhis A.fibb due to his anemia  I have ordered a FOBT to r/o GI bleed     Advance care planning  Assessment & Plan  His sister is now at bedside and we had a long discussion with patient and his sister regarding goals of care.  Since patient has declined echo today we discussed regarding code status and also comfort care and hospice  Will change patient to DNR/DNI for now , no hospice or comfort care  Sister will talk and try to convince him to see if he is willing to have the echo   More then 18 min spent on discussion for goals of care alone       Anemia- (present on admission)  Assessment & Plan  Baseline  hemoglobin 12-14.  Was 8.9 on presentation.    His OAC and antiplatelet have been held. I have ordered for FOBT to r/o GI bleed if that positive will consult GI   His hemoglobin yesterday was 6.5 and got 1 unit of RBC which brought his hemoglobin up to 9.4 uncleare if the 6.5 was an eror or possible due to his fluid overload   I have ordered for FOBT if that is positive will consult GI     Non-traumatic rhabdomyolysis- (present on admission)  Assessment & Plan  CPK continues to trend up starting IV fluid but need to be careful  as patient is edematous   This was also discussed with family     Persistent atrial fibrillation (HCC)- (present on admission)  Assessment & Plan  Follows with cardiology, holding metoprolol as his BP is low   Holding pradaxa as he is on heparin drip     Parkinson disease- (present on admission)  Assessment & Plan  Cont outpatient regimen   PT, OT ordered       VTE prophylaxis: scd    The very real possibilty of a deterioration of this patient's condition required the highest level of my preparedness for sudden, emergent intervention.  I provided  services, which included medication orders, frequent reevaluations of the patient's condition and response to treatment, ordering and reviewing test results, and discussing the case with various consultants.  The  time associated with the care of the patient was 52  minutes.             I have performed a physical exam and reviewed and updated ROS and Plan today (11/3/2023). In review of yesterday's note (11/2/2023), there are no changes except as documented above.

## 2023-11-04 NOTE — CARE PLAN
The patient is Watcher - Medium risk of patient condition declining or worsening    Shift Goals  Clinical Goals: Monitor HR/BP, monitor labs  Patient Goals: rest and comfort  Family Goals: delaney    Progress made toward(s) clinical / shift goals:    Problem: Knowledge Deficit - Standard  Goal: Patient and family/care givers will demonstrate understanding of plan of care, disease process/condition, diagnostic tests and medications  Outcome: Progressing; pt states understanding of current plan of care.      Problem: Fall Risk  Goal: Patient will remain free from falls  Outcome: Progressing; pt remains free from falls. Fall precautions in place.

## 2023-11-04 NOTE — PROGRESS NOTES
Bedside report received from NOC RN. Assumed care of pt. Pt awake, laying in bed. A/Ox4, VSS. No concerns, complaints or distress. Pt educated to call before getting out of bed. POC reviewed and white board updated. Tele box on. Afib 107 on the monitor. Call light in reach. Bed locked in lowest position with 2 upper bed rails up. Bed alarm on.

## 2023-11-05 PROCEDURE — 700105 HCHG RX REV CODE 258: Performed by: STUDENT IN AN ORGANIZED HEALTH CARE EDUCATION/TRAINING PROGRAM

## 2023-11-05 PROCEDURE — 770020 HCHG ROOM/CARE - TELE (206)

## 2023-11-05 PROCEDURE — A9270 NON-COVERED ITEM OR SERVICE: HCPCS | Performed by: STUDENT IN AN ORGANIZED HEALTH CARE EDUCATION/TRAINING PROGRAM

## 2023-11-05 PROCEDURE — 700102 HCHG RX REV CODE 250 W/ 637 OVERRIDE(OP): Performed by: INTERNAL MEDICINE

## 2023-11-05 PROCEDURE — 99232 SBSQ HOSP IP/OBS MODERATE 35: CPT | Performed by: STUDENT IN AN ORGANIZED HEALTH CARE EDUCATION/TRAINING PROGRAM

## 2023-11-05 PROCEDURE — 700101 HCHG RX REV CODE 250

## 2023-11-05 PROCEDURE — 700102 HCHG RX REV CODE 250 W/ 637 OVERRIDE(OP): Performed by: STUDENT IN AN ORGANIZED HEALTH CARE EDUCATION/TRAINING PROGRAM

## 2023-11-05 PROCEDURE — A9270 NON-COVERED ITEM OR SERVICE: HCPCS | Performed by: INTERNAL MEDICINE

## 2023-11-05 PROCEDURE — 97602 WOUND(S) CARE NON-SELECTIVE: CPT

## 2023-11-05 PROCEDURE — 700111 HCHG RX REV CODE 636 W/ 250 OVERRIDE (IP): Performed by: STUDENT IN AN ORGANIZED HEALTH CARE EDUCATION/TRAINING PROGRAM

## 2023-11-05 RX ORDER — SODIUM CHLORIDE, SODIUM LACTATE, POTASSIUM CHLORIDE, CALCIUM CHLORIDE 600; 310; 30; 20 MG/100ML; MG/100ML; MG/100ML; MG/100ML
INJECTION, SOLUTION INTRAVENOUS CONTINUOUS
Status: DISCONTINUED | OUTPATIENT
Start: 2023-11-05 | End: 2023-11-06

## 2023-11-05 RX ADMIN — TAMSULOSIN HYDROCHLORIDE 0.4 MG: 0.4 CAPSULE ORAL at 09:47

## 2023-11-05 RX ADMIN — ROTIGOTINE 4 MG: 4 PATCH, EXTENDED RELEASE TRANSDERMAL at 18:30

## 2023-11-05 RX ADMIN — SODIUM CHLORIDE, POTASSIUM CHLORIDE, SODIUM LACTATE AND CALCIUM CHLORIDE: 600; 310; 30; 20 INJECTION, SOLUTION INTRAVENOUS at 22:41

## 2023-11-05 RX ADMIN — SODIUM CHLORIDE, POTASSIUM CHLORIDE, SODIUM LACTATE AND CALCIUM CHLORIDE: 600; 310; 30; 20 INJECTION, SOLUTION INTRAVENOUS at 14:28

## 2023-11-05 RX ADMIN — DOCUSATE SODIUM 50 MG AND SENNOSIDES 8.6 MG 2 TABLET: 8.6; 5 TABLET, FILM COATED ORAL at 18:29

## 2023-11-05 RX ADMIN — Medication 10 MG: at 21:07

## 2023-11-05 RX ADMIN — POLYETHYLENE GLYCOL 3350 1 PACKET: 17 POWDER, FOR SOLUTION ORAL at 04:02

## 2023-11-05 RX ADMIN — OMEPRAZOLE 20 MG: 20 CAPSULE, DELAYED RELEASE ORAL at 18:29

## 2023-11-05 RX ADMIN — FINASTERIDE 5 MG: 5 TABLET, FILM COATED ORAL at 04:02

## 2023-11-05 RX ADMIN — METOPROLOL TARTRATE 5 MG: 5 INJECTION INTRAVENOUS at 21:03

## 2023-11-05 RX ADMIN — DOCUSATE SODIUM 100 MG: 100 CAPSULE, LIQUID FILLED ORAL at 04:02

## 2023-11-05 RX ADMIN — OMEPRAZOLE 20 MG: 20 CAPSULE, DELAYED RELEASE ORAL at 04:02

## 2023-11-05 RX ADMIN — POTASSIUM CHLORIDE 40 MEQ: 1500 TABLET, EXTENDED RELEASE ORAL at 04:02

## 2023-11-05 RX ADMIN — DOCUSATE SODIUM 50 MG AND SENNOSIDES 8.6 MG 2 TABLET: 8.6; 5 TABLET, FILM COATED ORAL at 04:02

## 2023-11-05 RX ADMIN — POTASSIUM CHLORIDE 40 MEQ: 1500 TABLET, EXTENDED RELEASE ORAL at 18:29

## 2023-11-05 RX ADMIN — THYROID, PORCINE 120 MG: 120 TABLET ORAL at 18:29

## 2023-11-05 RX ADMIN — DOCUSATE SODIUM 100 MG: 100 CAPSULE, LIQUID FILLED ORAL at 18:29

## 2023-11-05 RX ADMIN — PREDNISONE 5 MG: 5 TABLET ORAL at 04:02

## 2023-11-05 RX ADMIN — POTASSIUM CHLORIDE 40 MEQ: 1500 TABLET, EXTENDED RELEASE ORAL at 12:09

## 2023-11-05 ASSESSMENT — ENCOUNTER SYMPTOMS
VOMITING: 0
DIARRHEA: 0
SINUS PAIN: 0
SHORTNESS OF BREATH: 0
ABDOMINAL PAIN: 0
NAUSEA: 0
MYALGIAS: 1
FEVER: 0
COUGH: 0
FALLS: 1
CHILLS: 0

## 2023-11-05 ASSESSMENT — FIBROSIS 4 INDEX: FIB4 SCORE: 1.16

## 2023-11-05 ASSESSMENT — PAIN DESCRIPTION - PAIN TYPE: TYPE: ACUTE PAIN

## 2023-11-05 NOTE — PROGRESS NOTES
Hospital Medicine Daily Progress Note    Date of Service  11/4/2023    Chief Complaint  Elias Casanova is a 82 y.o. male admitted 10/26/2023 with NSTEMI, rhabdomyolysis     Hospital Course  This is a  82 y.o. male who was transferred from outside facility 10/26/2023 with rhabdomyolysis and elevated troponin.  PMH of A-fib on anticoagulation, Parkinson disease, hypertension, pulmonary hypertension  He was found on the ground, says he had fallen out of bed, he does not believe he lost consciousness.  He was too weak to get back up and thinks he was on the ground for about 12 hours or more.  He is found to have rhabdomyolysis at outside facility with CPK of 1100. He was also found to have elevated troponin and was sent here for high level of care   denies chest pain, troponin trending up, and his BP is low. metoprolol as his SBP went as low as 70.   His CPK also trended up so he has been started on IV fluid, but will need to be carefull as patient has already lower extremity edema.  Cardiology was consulted for NSTEMI and he was been started on heparin drip, an echo ordered   His sister is at bed side and discussed patient regarding goals of care, code status changed to DNR/DNI   Restarted back his parkinson medication   troponin up to around 1600. Cont on heparin drip.  Hypotension, he has h/o of adrenal insufficiency, transitioned to IV hydrocortisone for stress dose steroids  hemoglobin has dropped down to 6.5 this AM, received a unit of RBC, stoping heparin drip and also holding aspirin for now.  Started on lasix as patient was fluid overload.  A rapid was called, CXr concern for pneumonia started on IV abx.  given a unit of blood and this brought his hemoglobin up to 9.4 so unclear if that 6.5 was an error.  ordered for FOBT to r/o any GI bleed, he was constipated for a few days, increasing bowel regimen and he was able to have brown smears, no melena or red blood in stool.   per cardiology no antiplatelet or   OAC until his anemia source clarified. Hgb has been jumping from 6.5 to the 10's since arrival with no clear pattern.  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   On 11/1, patient had pulse overnight up to 160s, not sustained however tachycardic, systolic blood pressure down to the 80s.  Metoprolol was unable to be given since the hypotension.  net -2 L and appears dry, electrolytes replaced, IV fluid bolus given.  Bowel regimen increased.  Hoping to improve blood pressure with the IV fluids with hopes of restarting metoprolol to better control A-fib with RVR.  I had a long discussion with the patient and his sister at bedside, patient is agreeable to SNF when medically cleared.  No obvious GI bleeding, we discussed GI consultation he is not particularly interested in pursuing colonoscopy at this time, particularly given weak evidence suggesting possible ongoing GI bleed.  will follow-up with oncology/endocrinologist as outpatient to discuss his light chain abnormalities.  Remains high risk of clinical deterioration and high risk of mortality, prognosis guarded.    Interval Problem Update  11/2  Overnight heart rate went up to the 160s, sustained, started on p.o. metoprolol 12.5 as well as IV metoprolol push.  Given overnight and this morning heart rate was down improved to 105, this morning blood pressure came down to the 70s and 80s systolic for a bit.  After couple hours heart rate improved from 80s to low 100s systolic blood pressure improved to the upper 80s low 90s maps greater than 65.  We will continue with the metoprolol 12.5 mg daily in hopes that blood pressure will tolerate so that he can control his heart rate.  Volume status is difficult, bilateral lower extremity edema has been pretty chronic but now  worse, hesitant to give IV fluids.  Did not receive any today and his blood pressure improved as did heart rate with just a bit of time.  Once he shows 24 hours stable vitals heart rate and blood pressure can proceed with discharge. Patient and sister updated at bedside.     11/3  Patient with improved HR and BP on low dose metoprolol. No tachycardia or hypotension today. Hgb still jumping around but no bleeding noted. Will proceed with discharge tomorrow morning if no further episodes of RVR or significant hypotension that have been occurring almost nightly for the last few days. Discussed with family at bedside.    11/4  Metoprolol morning dose held due to SBP in low 100's, was tachycardic following that. Parameters again adjusted, metoprolol given and HR improved. Had some episodes of SBP in the 80's but asymptomatic. Late in the afternoon we received acceptance for SNF, Unclear if he can be admitted there tomorrow or if it has to be Monday based on their admission abilities on the weekend. Continue to monitor BP and HR closely.     I have discussed this patient's plan of care and discharge plan at IDT rounds today with Case Management, Nursing, Nursing leadership, and other members of the IDT team.    Consultants/Specialty  cardiology    Code Status  DNAR/DNI    Disposition  The patient is not medically cleared for discharge to home or a post-acute facility.      I have placed the appropriate orders for post-discharge needs.    Review of Systems  Review of Systems   Constitutional:  Negative for chills and fever.   HENT:  Negative for congestion, nosebleeds and sinus pain.    Respiratory:  Negative for cough and shortness of breath.    Cardiovascular:  Negative for chest pain.   Gastrointestinal:  Negative for abdominal pain, diarrhea, nausea and vomiting.   Genitourinary:  Negative for dysuria and urgency.   Musculoskeletal:  Positive for falls and myalgias.   All other systems reviewed and are negative.        Physical Exam  Temp:  [36.3 °C (97.3 °F)-36.9 °C (98.4 °F)] 36.3 °C (97.3 °F)  Pulse:  [] 86  Resp:  [16-20] 18  BP: ()/(51-95) 96/67  SpO2:  [80 %-99 %] 97 %    Physical Exam  Constitutional:       General: He is not in acute distress.     Appearance: Normal appearance. He is not toxic-appearing.   HENT:      Head: Normocephalic and atraumatic.      Mouth/Throat:      Mouth: Mucous membranes are moist.      Pharynx: Oropharynx is clear. No oropharyngeal exudate or posterior oropharyngeal erythema.   Eyes:      General: No scleral icterus.  Cardiovascular:      Rate and Rhythm: Tachycardia present. Rhythm irregular.      Pulses: Normal pulses.      Heart sounds: Normal heart sounds. No murmur heard.  Pulmonary:      Effort: Pulmonary effort is normal. No respiratory distress.      Breath sounds: Normal breath sounds. No wheezing.   Abdominal:      Palpations: Abdomen is soft.      Tenderness: There is no abdominal tenderness.   Musculoskeletal:         General: No swelling or tenderness. Normal range of motion.      Cervical back: Normal range of motion.      Right lower leg: Edema present.      Left lower leg: Edema present.   Skin:     General: Skin is warm and dry.   Neurological:      General: No focal deficit present.      Mental Status: He is alert and oriented to person, place, and time. Mental status is at baseline.      Motor: Weakness (generalized) present.   Psychiatric:         Mood and Affect: Mood normal.         Behavior: Behavior normal.         Thought Content: Thought content normal.         Judgment: Judgment normal.         Fluids    Intake/Output Summary (Last 24 hours) at 11/5/2023 0442  Last data filed at 11/5/2023 0259  Gross per 24 hour   Intake --   Output 3550 ml   Net -3550 ml         Laboratory  Recent Labs     11/02/23  0852 11/03/23  0419   WBC  --  11.3*   RBC  --  2.97*   HEMOGLOBIN 9.1* 8.1*   HEMATOCRIT 31.0* 27.3*   MCV  --  91.9   MCH  --  27.3   MCHC  --  29.7*    RDW  --  55.5*   PLATELETCT  --  338   MPV  --  10.3       Recent Labs     11/03/23  0419   SODIUM 138   POTASSIUM 4.9   CHLORIDE 107   CO2 24   GLUCOSE 82   BUN 33*   CREATININE 0.87   CALCIUM 8.2*                         Imaging  DX-ESOPHAGUS - [id number]   Final Result      EC-ECHOCARDIOGRAM COMPLETE W/O CONT   Final Result      DX-CHEST-PORTABLE (1 VIEW)   Final Result      1.  Hypoinflation with elevated LEFT hemidiaphragm and LEFT lung base atelectasis or pneumonia.   2.  Probable mild edema.   3.  No pneumothorax.           Assessment/Plan  * NSTEMI (non-ST elevated myocardial infarction) ([facility])- (present on admission)  Assessment & Plan  Troponin significantly elevated at outside facility and trending up, in the 600s here  EKG from transferring facility not concerning for acute ischemia, repeat EKG here ordered  Patient fell out of bed and was unable to get up, he does have a history of Parkinson's disease.  Unclear if he had a coronary event that resulted in him falling  I have consulted cardiology and discussed case with Dr. Kelly   Will continue on heparin drip for now, echo has been ordered since troponin trending up   No cardiac cath at this time as patient not a good candidate for it.  He declined echo initially now family at bedside and we had discussion, with patient sister who will discuss with him again to see if she can convince him to have the echo   close monitoring on tele   Echo showing ischemia/ MI on the LAD territory   Cardiology following  As per cardiology to hold off on his antiplatelet or OAC forhis A.fibb due to his anemia  I have ordered a FOBT to r/o GI bleed     Advance care planning  Assessment & Plan  His sister is now at bedside and we had a long discussion with patient and his sister regarding goals of care.  Since patient has declined echo today we discussed regarding code status and also comfort care and hospice  Will change patient to DNR/DNI for now , no hospice or  comfort care  Sister will talk and try to convince him to see if he is willing to have the echo   More then 18 min spent on discussion for goals of care alone       Anemia- (present on admission)  Assessment & Plan  Baseline hemoglobin 12-14.  Was 8.9 on presentation.    His OAC and antiplatelet have been held. I have ordered for FOBT to r/o GI bleed if that positive will consult GI   His hemoglobin yesterday was 6.5 and got 1 unit of RBC which brought his hemoglobin up to 9.4 uncleare if the 6.5 was an eror or possible due to his fluid overload   I have ordered for FOBT if that is positive will consult GI     Non-traumatic rhabdomyolysis- (present on admission)  Assessment & Plan  CPK continues to trend up starting IV fluid but need to be careful  as patient is edematous   This was also discussed with family     Persistent atrial fibrillation (HCC)- (present on admission)  Assessment & Plan  Follows with cardiology, holding metoprolol as his BP is low   Holding pradaxa as he is on heparin drip     Parkinson disease- (present on admission)  Assessment & Plan  Cont outpatient regimen   PT, OT ordered       VTE prophylaxis: scd    The very real possibilty of a deterioration of this patient's condition required the highest level of my preparedness for sudden, emergent intervention.  I provided  services, which included medication orders, frequent reevaluations of the patient's condition and response to treatment, ordering and reviewing test results, and discussing the case with various consultants.  The  time associated with the care of the patient was 54  minutes.             I have performed a physical exam and reviewed and updated ROS and Plan today (11/4/2023). In review of yesterday's note (11/3/2023), there are no changes except as documented above.

## 2023-11-05 NOTE — DISCHARGE PLANNING
Case Management Discharge Planning    Admission Date: 10/26/2023  GMLOS: 4.2  ALOS: 10    6-Clicks ADL Score: 14  6-Clicks Mobility Score: 10  PT and/or OT Eval ordered: Yes  Post-acute Referrals Ordered: Yes  Post-acute Choice Obtained: Yes  Has referral(s) been sent to post-acute provider:  Yes      Anticipated Discharge Dispo: Discharge Disposition: D/T to SNF with Medicare cert in anticipation of skilled care (03)    DME Needed: No    Action(s) Taken: LSW made phone call to [facility] and left  with callback request.    Addendum @0902  LSW received call back from Marifer at [facility] who confirmed they do have a bed for pt today and would like transportation arranged for 1300.    LSW met with pt at bedside to discuss DC to [facility] and deliver second IMM. LSW also spoke with pt's sister over the phone at bedside per his request to discuss DC and answer questions. Pt and his sister stated they do not feel comfortable with pt discharging just yet as they want to ensure a couple days of stability before he transfers anywhere. Appeal information provided to pt's sister and IMM signed by pt. LSW made phone call to Marifer at [facility] to provide update.    Escalations Completed: None    Medically Clear: Yes    Next Steps: Care coordination will f/u with Saman    Barriers to Discharge: Appealed DC    Is the patient up for discharge tomorrow: No

## 2023-11-05 NOTE — WOUND TEAM
[facility]  Inpatient Services  Wound and Skin Care Brief Evaluation    Admission Date: 10/26/2023     Last order of IP CONSULT TO WOUND CARE was found on 11/4/2023 from Hospital Encounter on 10/26/2023     HPI, PMH, SH: Reviewed    No chief complaint on file.    Diagnosis: Elevated troponin [R79.89]    Unit where seen by Wound Team: T720/00     Wound consult placed regarding sacrum. Chart and images reviewed. This clinician in to assess patient. Patient pleasant and agreeable. Patient sacrum with small partial thickness skin loss due to moisture and friction. Wound bed is blanching. Non-selectively debrided with Moist warm washcloth. Barrier paste applied for protective layer. Microclimate pad replaced.    No pressure injuries or advanced wound care needs identified. Wound consult completed. No further follow up unless indicated and consulted.       Wound 11/04/23 Partial Thickness Wound Sacrum (Active)   Date First Assessed/Time First Assessed: 11/04/23 1807   Hand Hygiene Completed: Yes  Primary Wound Type: Partial Thickness Wound  Location: Sacrum      Assessments 11/5/2023 11:00 AM   Site Assessment Red   Periwound Assessment Intact;Pink   Margins Attached edges   Closure Secondary intention   Drainage Amount Small   Drainage Description Sanguineous   Treatments Cleansed;Site care;Offloading   Wound Cleansing Foam Cleanser/Washcloth   Periwound Protectant Barrier Paste   Dressing Status Open to Air   Dressing Cleansing/Solutions Not Applicable   Dressing Change/Treatment Frequency Every Shift, and As Needed   NEXT Dressing Change/Treatment Date 11/05/23   NEXT Weekly Photo (Inpatient Only) 11/12/23   Wound Team Following Not following   Non-staged Wound Description Partial thickness     Sacrum      PREVENTATIVE INTERVENTIONS:    Q shift Logan - performed per nursing policy  Q shift pressure point assessments - performed per nursing policy    Surface/Positioning  Reposition q 2 hours - Currently  in Place  Waffle overlay  - Currently in Place    Offloading/Redistribution  Heel offloading dressing (Silicone dressing) - Currently in Place  Float Heels off Bed with Pillows - Currently in Place       Sacral offloading dressing removed due to frequent leakage from condom cath per patient      Containment/Moisture Prevention    Purwick/Condom Cath - Currently in Place  Barrier paste - Applied this Visit

## 2023-11-05 NOTE — PROGRESS NOTES
Handoff report received from day shift nurse. Pt care assumed. Pt is currently resting in bed. POC discussed with Pt and Pt verbalizes no questions at this time. Pt is AAOx4, on RA on Tele monitoring, and VSS. Call light and belongings within reach, bed in lowest and locked position, and Pt educated on use of call light. Will continue to monitor.

## 2023-11-05 NOTE — PROGRESS NOTES
Monitor summary:        Rhythm: afib  Rate: , up to 130s  Ectopy: n/a  Measurements: 0./.05/.0        12hr chart check

## 2023-11-05 NOTE — CARE PLAN
Problem: Knowledge Deficit - Standard  Goal: Patient and family/care givers will demonstrate understanding of plan of care, disease process/condition, diagnostic tests and medications  Outcome: Progressing     Problem: Skin Integrity  Goal: Skin integrity is maintained or improved  Outcome: Progressing     Problem: Fall Risk  Goal: Patient will remain free from falls  Outcome: Progressing     Problem: Fluid Volume  Goal: Fluid volume balance will be maintained  Outcome: Progressing     Problem: Nutrition  Goal: Patient's nutritional and fluid intake will be adequate or improve  Outcome: Progressing  Goal: Enteral nutrition will be maintained or improve  Outcome: Progressing  Goal: Enteral nutrition will be maintained or improve  Outcome: Progressing     Problem: Urinary Elimination  Goal: Establish and maintain regular urinary output  Outcome: Progressing     Problem: Bowel Elimination  Goal: Establish and maintain regular bowel function  Outcome: Progressing     Problem: Rectal Tube  Goal: Fecal output will be contained and skin will remain free from irritation  Outcome: Progressing     Problem: Mobility  Goal: Patient's capacity to carry out activities will improve  Outcome: Progressing     Problem: Self Care  Goal: Patient will have the ability to perform ADLs independently or with assistance (bathe, groom, dress, toilet and feed)  Outcome: Progressing     Problem: Communication  Goal: The ability to communicate needs accurately and effectively will improve  Outcome: Progressing     Problem: Hemodynamics  Goal: Patient's hemodynamics, fluid balance and neurologic status will be stable or improve  Outcome: Progressing     Problem: Respiratory  Goal: Patient will achieve/maintain optimum respiratory ventilation and gas exchange  Outcome: Progressing   The patient is Watcher - Medium risk of patient condition declining or worsening    Shift Goals  Clinical Goals: VSS, safety  Patient Goals: to go home  Family  Goals: AYDEN    Progress made toward(s) clinical / shift goals:  VSS. safety    Patient is not progressing towards the following goals:

## 2023-11-06 LAB
ANION GAP SERPL CALC-SCNC: 7 MMOL/L (ref 7–16)
APPEARANCE UR: CLEAR
BACTERIA #/AREA URNS HPF: NEGATIVE /HPF
BILIRUB UR QL STRIP.AUTO: NEGATIVE
BUN SERPL-MCNC: 21 MG/DL (ref 8–22)
CALCIUM SERPL-MCNC: 7.7 MG/DL (ref 8.5–10.5)
CHLORIDE SERPL-SCNC: 106 MMOL/L (ref 96–112)
CO2 SERPL-SCNC: 24 MMOL/L (ref 20–33)
COLOR UR: YELLOW
CREAT SERPL-MCNC: 0.72 MG/DL (ref 0.5–1.4)
EPI CELLS #/AREA URNS HPF: NEGATIVE /HPF
ERYTHROCYTE [DISTWIDTH] IN BLOOD BY AUTOMATED COUNT: 53.6 FL (ref 35.9–50)
GFR SERPLBLD CREATININE-BSD FMLA CKD-EPI: 92 ML/MIN/1.73 M 2
GLUCOSE SERPL-MCNC: 89 MG/DL (ref 65–99)
GLUCOSE UR STRIP.AUTO-MCNC: NEGATIVE MG/DL
HCT VFR BLD AUTO: 27.1 % (ref 42–52)
HGB BLD-MCNC: 8 G/DL (ref 14–18)
HYALINE CASTS #/AREA URNS LPF: ABNORMAL /LPF
KETONES UR STRIP.AUTO-MCNC: NEGATIVE MG/DL
LEUKOCYTE ESTERASE UR QL STRIP.AUTO: ABNORMAL
MAGNESIUM SERPL-MCNC: 1.6 MG/DL (ref 1.5–2.5)
MCH RBC QN AUTO: 26.8 PG (ref 27–33)
MCHC RBC AUTO-ENTMCNC: 29.5 G/DL (ref 32.3–36.5)
MCV RBC AUTO: 90.6 FL (ref 81.4–97.8)
MICRO URNS: ABNORMAL
NITRITE UR QL STRIP.AUTO: NEGATIVE
PH UR STRIP.AUTO: 5.5 [PH] (ref 5–8)
PLATELET # BLD AUTO: 322 K/UL (ref 164–446)
PMV BLD AUTO: 9.8 FL (ref 9–12.9)
POTASSIUM SERPL-SCNC: 5 MMOL/L (ref 3.6–5.5)
PROT UR QL STRIP: NEGATIVE MG/DL
RBC # BLD AUTO: 2.99 M/UL (ref 4.7–6.1)
RBC # URNS HPF: ABNORMAL /HPF
RBC UR QL AUTO: NEGATIVE
SODIUM SERPL-SCNC: 137 MMOL/L (ref 135–145)
SP GR UR STRIP.AUTO: 1.01
UROBILINOGEN UR STRIP.AUTO-MCNC: 0.2 MG/DL
WBC # BLD AUTO: 9.7 K/UL (ref 4.8–10.8)
WBC #/AREA URNS HPF: ABNORMAL /HPF
YEAST BUDDING URNS QL: PRESENT /HPF

## 2023-11-06 PROCEDURE — 81001 URINALYSIS AUTO W/SCOPE: CPT

## 2023-11-06 PROCEDURE — 700111 HCHG RX REV CODE 636 W/ 250 OVERRIDE (IP): Mod: JZ | Performed by: STUDENT IN AN ORGANIZED HEALTH CARE EDUCATION/TRAINING PROGRAM

## 2023-11-06 PROCEDURE — 83735 ASSAY OF MAGNESIUM: CPT

## 2023-11-06 PROCEDURE — 99233 SBSQ HOSP IP/OBS HIGH 50: CPT | Performed by: STUDENT IN AN ORGANIZED HEALTH CARE EDUCATION/TRAINING PROGRAM

## 2023-11-06 PROCEDURE — 700102 HCHG RX REV CODE 250 W/ 637 OVERRIDE(OP): Performed by: INTERNAL MEDICINE

## 2023-11-06 PROCEDURE — 36415 COLL VENOUS BLD VENIPUNCTURE: CPT

## 2023-11-06 PROCEDURE — A9270 NON-COVERED ITEM OR SERVICE: HCPCS | Performed by: STUDENT IN AN ORGANIZED HEALTH CARE EDUCATION/TRAINING PROGRAM

## 2023-11-06 PROCEDURE — 700102 HCHG RX REV CODE 250 W/ 637 OVERRIDE(OP): Performed by: STUDENT IN AN ORGANIZED HEALTH CARE EDUCATION/TRAINING PROGRAM

## 2023-11-06 PROCEDURE — 700105 HCHG RX REV CODE 258: Performed by: STUDENT IN AN ORGANIZED HEALTH CARE EDUCATION/TRAINING PROGRAM

## 2023-11-06 PROCEDURE — A9270 NON-COVERED ITEM OR SERVICE: HCPCS | Performed by: INTERNAL MEDICINE

## 2023-11-06 PROCEDURE — 80048 BASIC METABOLIC PNL TOTAL CA: CPT

## 2023-11-06 PROCEDURE — 770020 HCHG ROOM/CARE - TELE (206)

## 2023-11-06 PROCEDURE — 85027 COMPLETE CBC AUTOMATED: CPT

## 2023-11-06 RX ORDER — DIGOXIN 125 MCG
62.5 TABLET ORAL DAILY
Status: DISCONTINUED | OUTPATIENT
Start: 2023-11-06 | End: 2023-11-10 | Stop reason: HOSPADM

## 2023-11-06 RX ORDER — SODIUM CHLORIDE, SODIUM LACTATE, POTASSIUM CHLORIDE, CALCIUM CHLORIDE 600; 310; 30; 20 MG/100ML; MG/100ML; MG/100ML; MG/100ML
INJECTION, SOLUTION INTRAVENOUS CONTINUOUS
Status: ACTIVE | OUTPATIENT
Start: 2023-11-06 | End: 2023-11-07

## 2023-11-06 RX ORDER — MAGNESIUM SULFATE HEPTAHYDRATE 40 MG/ML
2 INJECTION, SOLUTION INTRAVENOUS ONCE
Status: COMPLETED | OUTPATIENT
Start: 2023-11-06 | End: 2023-11-06

## 2023-11-06 RX ORDER — SODIUM CHLORIDE, SODIUM LACTATE, POTASSIUM CHLORIDE, CALCIUM CHLORIDE 600; 310; 30; 20 MG/100ML; MG/100ML; MG/100ML; MG/100ML
INJECTION, SOLUTION INTRAVENOUS CONTINUOUS
Status: ACTIVE | OUTPATIENT
Start: 2023-11-06 | End: 2023-11-06

## 2023-11-06 RX ADMIN — MAGNESIUM SULFATE HEPTAHYDRATE 2 G: 2 INJECTION, SOLUTION INTRAVENOUS at 05:56

## 2023-11-06 RX ADMIN — POTASSIUM CHLORIDE 40 MEQ: 1500 TABLET, EXTENDED RELEASE ORAL at 11:54

## 2023-11-06 RX ADMIN — ROTIGOTINE 4 MG: 4 PATCH, EXTENDED RELEASE TRANSDERMAL at 18:19

## 2023-11-06 RX ADMIN — OMEPRAZOLE 20 MG: 20 CAPSULE, DELAYED RELEASE ORAL at 05:50

## 2023-11-06 RX ADMIN — POTASSIUM CHLORIDE 40 MEQ: 1500 TABLET, EXTENDED RELEASE ORAL at 18:01

## 2023-11-06 RX ADMIN — DOCUSATE SODIUM 50 MG AND SENNOSIDES 8.6 MG 2 TABLET: 8.6; 5 TABLET, FILM COATED ORAL at 05:49

## 2023-11-06 RX ADMIN — DOCUSATE SODIUM 50 MG AND SENNOSIDES 8.6 MG 2 TABLET: 8.6; 5 TABLET, FILM COATED ORAL at 18:01

## 2023-11-06 RX ADMIN — POTASSIUM CHLORIDE 40 MEQ: 1500 TABLET, EXTENDED RELEASE ORAL at 05:49

## 2023-11-06 RX ADMIN — DOCUSATE SODIUM 100 MG: 100 CAPSULE, LIQUID FILLED ORAL at 05:49

## 2023-11-06 RX ADMIN — TAMSULOSIN HYDROCHLORIDE 0.4 MG: 0.4 CAPSULE ORAL at 09:57

## 2023-11-06 RX ADMIN — PREDNISONE 5 MG: 5 TABLET ORAL at 05:49

## 2023-11-06 RX ADMIN — SODIUM CHLORIDE, POTASSIUM CHLORIDE, SODIUM LACTATE AND CALCIUM CHLORIDE: 600; 310; 30; 20 INJECTION, SOLUTION INTRAVENOUS at 10:09

## 2023-11-06 RX ADMIN — LEVOTHYROXINE, LIOTHYRONINE 60 MG: 19; 4.5 TABLET ORAL at 05:48

## 2023-11-06 RX ADMIN — DIGOXIN 62.5 MCG: 0.12 TABLET ORAL at 18:01

## 2023-11-06 RX ADMIN — FINASTERIDE 5 MG: 5 TABLET, FILM COATED ORAL at 05:49

## 2023-11-06 RX ADMIN — OMEPRAZOLE 20 MG: 20 CAPSULE, DELAYED RELEASE ORAL at 18:01

## 2023-11-06 RX ADMIN — DOCUSATE SODIUM 100 MG: 100 CAPSULE, LIQUID FILLED ORAL at 18:01

## 2023-11-06 RX ADMIN — Medication 10 MG: at 21:46

## 2023-11-06 ASSESSMENT — ENCOUNTER SYMPTOMS
MYALGIAS: 1
CHILLS: 0
COUGH: 0
SINUS PAIN: 0
NAUSEA: 0
VOMITING: 0
FEVER: 0
SHORTNESS OF BREATH: 0
FALLS: 1
DIARRHEA: 0
ABDOMINAL PAIN: 0

## 2023-11-06 ASSESSMENT — FIBROSIS 4 INDEX: FIB4 SCORE: 1.22

## 2023-11-06 NOTE — CARE PLAN
The patient is Watcher - Medium risk of patient condition declining or worsening    Shift Goals  Clinical Goals: Monitor HR and BP  Patient Goals: to go home  Family Goals: monitor BP    Progress made toward(s) clinical / shift goals:    Problem: Knowledge Deficit - Standard  Goal: Patient and family/care givers will demonstrate understanding of plan of care, disease process/condition, diagnostic tests and medications  Outcome: Progressing; pt states understanding of current plan of care.      Problem: Fall Risk  Goal: Patient will remain free from falls  Outcome: Progressing; pt remains free from falls. Fall precautions in place.

## 2023-11-06 NOTE — CARE PLAN
The patient is Watcher - Medium risk of patient condition declining or worsening    Shift Goals  Clinical Goals: Monitor HR and BP  Patient Goals: Go back home  Family Goals: AYDEN    Progress made toward(s) clinical / shift goals:  Patient updated on plan of care. Q2 turns and reapply mepilex as needed. Call light within reach, patient encouraged to use for assistance..      Problem: Fall Risk  Goal: Patient will remain free from falls  11/6/2023 1109 by Zari Soto R.N.  Outcome: Progressing     Problem: Skin Integrity  Goal: Skin integrity is maintained or improved  Outcome: Progressing       Patient is not progressing towards the following goals:

## 2023-11-06 NOTE — DISCHARGE PLANNING
Received request from [facility]. Chart notes have been faxed to [facility] as per request. Faxed to [phone]

## 2023-11-06 NOTE — PROGRESS NOTES
Hospital Medicine Daily Progress Note    Date of Service  11/5/2023    Chief Complaint  Elias Casanova is a 82 y.o. male admitted 10/26/2023 with NSTEMI, rhabdomyolysis     Hospital Course  This is a  82 y.o. male who was transferred from outside facility 10/26/2023 with rhabdomyolysis and elevated troponin.  PMH of A-fib on anticoagulation, Parkinson disease, hypertension, pulmonary hypertension  He was found on the ground, says he had fallen out of bed, he does not believe he lost consciousness.  He was too weak to get back up and thinks he was on the ground for about 12 hours or more.  He is found to have rhabdomyolysis at outside facility with CPK of 1100. He was also found to have elevated troponin and was sent here for high level of care   denies chest pain, troponin trending up, and his BP is low. metoprolol as his SBP went as low as 70.   His CPK also trended up so he has been started on IV fluid, but will need to be carefull as patient has already lower extremity edema.  Cardiology was consulted for NSTEMI and he was been started on heparin drip, an echo ordered   His sister is at bed side and discussed patient regarding goals of care, code status changed to DNR/DNI   Restarted back his parkinson medication   troponin up to around 1600. Cont on heparin drip.  Hypotension, he has h/o of adrenal insufficiency, transitioned to IV hydrocortisone for stress dose steroids  hemoglobin has dropped down to 6.5 this AM, received a unit of RBC, stoping heparin drip and also holding aspirin for now.  Started on lasix as patient was fluid overload.  A rapid was called, CXr concern for pneumonia started on IV abx.  given a unit of blood and this brought his hemoglobin up to 9.4 so unclear if that 6.5 was an error.  ordered for FOBT to r/o any GI bleed, he was constipated for a few days, increasing bowel regimen and he was able to have brown smears, no melena or red blood in stool.   per cardiology no antiplatelet or   OAC until his anemia source clarified. Hgb has been jumping from 6.5 to the 10's since arrival with no clear pattern.  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   On 11/1, patient had pulse overnight up to 160s, not sustained however tachycardic, systolic blood pressure down to the 80s.  Metoprolol was unable to be given since the hypotension.  net -2 L and appears dry, electrolytes replaced, IV fluid bolus given.  Bowel regimen increased.  Hoping to improve blood pressure with the IV fluids with hopes of restarting metoprolol to better control A-fib with RVR.  I had a long discussion with the patient and his sister at bedside, patient is agreeable to SNF when medically cleared.  No obvious GI bleeding, we discussed GI consultation he is not particularly interested in pursuing colonoscopy at this time, particularly given weak evidence suggesting possible ongoing GI bleed.  will follow-up with oncology/endocrinologist as outpatient to discuss his light chain abnormalities.  Remains high risk of clinical deterioration and high risk of mortality, prognosis guarded.    Interval Problem Update  11/2  Overnight heart rate went up to the 160s, sustained, started on p.o. metoprolol 12.5 as well as IV metoprolol push.  Given overnight and this morning heart rate was down improved to 105, this morning blood pressure came down to the 70s and 80s systolic for a bit.  After couple hours heart rate improved from 80s to low 100s systolic blood pressure improved to the upper 80s low 90s maps greater than 65.  We will continue with the metoprolol 12.5 mg daily in hopes that blood pressure will tolerate so that he can control his heart rate.  Volume status is difficult, bilateral lower extremity edema has been pretty chronic but now  worse, hesitant to give IV fluids.  Did not receive any today and his blood pressure improved as did heart rate with just a bit of time.  Once he shows 24 hours stable vitals heart rate and blood pressure can proceed with discharge. Patient and sister updated at bedside.     11/3  Patient with improved HR and BP on low dose metoprolol. No tachycardia or hypotension today. Hgb still jumping around but no bleeding noted. Will proceed with discharge tomorrow morning if no further episodes of RVR or significant hypotension that have been occurring almost nightly for the last few days. Discussed with family at bedside.    11/4  Metoprolol morning dose held due to SBP in low 100's, was tachycardic following that. Parameters again adjusted, metoprolol given and HR improved. Had some episodes of SBP in the 80's but asymptomatic. Late in the afternoon we received acceptance for SNF, Unclear if he can be admitted there tomorrow or if it has to be Monday based on their admission abilities on the weekend. Continue to monitor BP and HR closely.     11/5  I was notified by case management this AM he is appealing his discharge, will proceed with normal process.  Edward is feeling a bit better today. He continues to feel weak but had a shave and a good bath with staff and he is feeling pretty good. He has had some low BP readings but asymptomatic. Replace magnesium. Metoprolol parameters adjusted. Almost 3 L UOP day prior, net negative total almost 9L, will give some back with IVF today, bot the best PO intake, should help BP as well.     I have discussed this patient's plan of care and discharge plan at IDT rounds today with Case Management, Nursing, Nursing leadership, and other members of the IDT team.    Consultants/Specialty  cardiology    Code Status  DNAR/DNI    Disposition  The patient is medically cleared for discharge to home or a post-acute facility.  Anticipate discharge to: skilled nursing facility    I have placed the  appropriate orders for post-discharge needs.    Review of Systems  Review of Systems   Constitutional:  Negative for chills and fever.   HENT:  Negative for congestion, nosebleeds and sinus pain.    Respiratory:  Negative for cough and shortness of breath.    Cardiovascular:  Negative for chest pain.   Gastrointestinal:  Negative for abdominal pain, diarrhea, nausea and vomiting.   Genitourinary:  Negative for dysuria and urgency.   Musculoskeletal:  Positive for falls and myalgias.   All other systems reviewed and are negative.       Physical Exam  Temp:  [36.4 °C (97.5 °F)-36.9 °C (98.4 °F)] 36.8 °C (98.3 °F)  Pulse:  [] 100  Resp:  [18-22] 19  BP: ()/() 103/71  SpO2:  [91 %-98 %] 93 %    Physical Exam  Constitutional:       General: He is not in acute distress.     Appearance: Normal appearance. He is not toxic-appearing.   HENT:      Head: Normocephalic and atraumatic.      Mouth/Throat:      Mouth: Mucous membranes are moist.      Pharynx: Oropharynx is clear. No oropharyngeal exudate or posterior oropharyngeal erythema.   Eyes:      General: No scleral icterus.  Cardiovascular:      Rate and Rhythm: Normal rate. Rhythm irregular.      Pulses: Normal pulses.      Heart sounds: Normal heart sounds. No murmur heard.  Pulmonary:      Effort: Pulmonary effort is normal. No respiratory distress.      Breath sounds: Normal breath sounds. No wheezing.   Abdominal:      Palpations: Abdomen is soft.      Tenderness: There is no abdominal tenderness.   Musculoskeletal:         General: No swelling or tenderness. Normal range of motion.      Cervical back: Normal range of motion.      Right lower leg: Edema present.      Left lower leg: Edema present.   Skin:     General: Skin is warm and dry.   Neurological:      General: No focal deficit present.      Mental Status: He is alert and oriented to person, place, and time. Mental status is at baseline.      Motor: Weakness (generalized) present.    Psychiatric:         Mood and Affect: Mood normal.         Behavior: Behavior normal.         Thought Content: Thought content normal.         Judgment: Judgment normal.         Fluids    Intake/Output Summary (Last 24 hours) at 11/6/2023 0503  Last data filed at 11/6/2023 0004  Gross per 24 hour   Intake 260 ml   Output 1400 ml   Net -1140 ml         Laboratory  Recent Labs     11/06/23  0145   WBC 9.7   RBC 2.99*   HEMOGLOBIN 8.0*   HEMATOCRIT 27.1*   MCV 90.6   MCH 26.8*   MCHC 29.5*   RDW 53.6*   PLATELETCT 322   MPV 9.8       Recent Labs     11/06/23  0145   SODIUM 137   POTASSIUM 5.0   CHLORIDE 106   CO2 24   GLUCOSE 89   BUN 21   CREATININE 0.72   CALCIUM 7.7*                         Imaging  DX-ESOPHAGUS - [id number]   Final Result      EC-ECHOCARDIOGRAM COMPLETE W/O CONT   Final Result      DX-CHEST-PORTABLE (1 VIEW)   Final Result      1.  Hypoinflation with elevated LEFT hemidiaphragm and LEFT lung base atelectasis or pneumonia.   2.  Probable mild edema.   3.  No pneumothorax.           Assessment/Plan  * NSTEMI (non-ST elevated myocardial infarction) (HCC)- (present on admission)  Assessment & Plan  Troponin significantly elevated at outside facility and trending up, in the 600s here  EKG from transferring facility not concerning for acute ischemia, repeat EKG here ordered  Patient fell out of bed and was unable to get up, he does have a history of Parkinson's disease.  Unclear if he had a coronary event that resulted in him falling  I have consulted cardiology and discussed case with Dr. Kelly   Will continue on heparin drip for now, echo has been ordered since troponin trending up   No cardiac cath at this time as patient not a good candidate for it.  He declined echo initially now family at bedside and we had discussion, with patient sister who will discuss with him again to see if she can convince him to have the echo   close monitoring on tele   Echo showing ischemia/ MI on the LAD territory    Cardiology following  As per cardiology to hold off on his antiplatelet or OAC forhis A.fibb due to his anemia  I have ordered a FOBT to r/o GI bleed     Advance care planning  Assessment & Plan  His sister is now at bedside and we had a long discussion with patient and his sister regarding goals of care.  Since patient has declined echo today we discussed regarding code status and also comfort care and hospice  Will change patient to DNR/DNI for now , no hospice or comfort care  Sister will talk and try to convince him to see if he is willing to have the echo   More then 18 min spent on discussion for goals of care alone       Anemia- (present on admission)  Assessment & Plan  Baseline hemoglobin 12-14.  Was 8.9 on presentation.    His OAC and antiplatelet have been held. I have ordered for FOBT to r/o GI bleed if that positive will consult GI   His hemoglobin yesterday was 6.5 and got 1 unit of RBC which brought his hemoglobin up to 9.4 uncleare if the 6.5 was an eror or possible due to his fluid overload   I have ordered for FOBT if that is positive will consult GI     Non-traumatic rhabdomyolysis- (present on admission)  Assessment & Plan  CPK continues to trend up starting IV fluid but need to be careful  as patient is edematous   This was also discussed with family     Persistent atrial fibrillation (HCC)- (present on admission)  Assessment & Plan  Follows with cardiology, holding metoprolol as his BP is low   Holding pradaxa as he is on heparin drip     Parkinson disease- (present on admission)  Assessment & Plan  Cont outpatient regimen   PT, OT ordered       VTE prophylaxis: scd    The very real possibilty of a deterioration of this patient's condition required the highest level of my preparedness for sudden, emergent intervention.  I provided  services, which included medication orders, frequent reevaluations of the patient's condition and response to treatment, ordering and reviewing test results, and  discussing the case with various consultants.  The  time associated with the care of the patient was 54  minutes.             I have performed a physical exam and reviewed and updated ROS and Plan today (11/5/2023). In review of yesterday's note (11/4/2023), there are no changes except as documented above.

## 2023-11-06 NOTE — PROGRESS NOTES
Monitor Summary:     Rhythm: A-fib/A-flutter  Rate:   Ectopy: pvc rare  Measurements: -/0.07/-           12 Hour Chart Check

## 2023-11-06 NOTE — CARE PLAN
The patient is Watcher - Medium risk of patient condition declining or worsening    Shift Goals  Clinical Goals: VSS  Patient Goals: rest, comfort  Family Goals: AYDEN    Progress made toward(s) clinical / shift goals:    Problem: Knowledge Deficit - Standard  Goal: Patient and family/care givers will demonstrate understanding of plan of care, disease process/condition, diagnostic tests and medications  Outcome: Progressing     Problem: Skin Integrity  Goal: Skin integrity is maintained or improved  Outcome: Progressing     Problem: Fall Risk  Goal: Patient will remain free from falls  Outcome: Progressing     Problem: Fluid Volume  Goal: Fluid volume balance will be maintained  Outcome: Progressing     Problem: Nutrition  Goal: Patient's nutritional and fluid intake will be adequate or improve  Outcome: Progressing     Problem: Urinary Elimination  Goal: Establish and maintain regular urinary output  Outcome: Progressing     Problem: Bowel Elimination  Goal: Establish and maintain regular bowel function  Outcome: Progressing     Problem: Self Care  Goal: Patient will have the ability to perform ADLs independently or with assistance (bathe, groom, dress, toilet and feed)  Outcome: Progressing     Problem: Communication  Goal: The ability to communicate needs accurately and effectively will improve  Outcome: Progressing     Problem: Hemodynamics  Goal: Patient's hemodynamics, fluid balance and neurologic status will be stable or improve  Outcome: Progressing       Patient is not progressing towards the following goals:

## 2023-11-06 NOTE — THERAPY
Occupational Therapy Contact Note    Patient Name: Elias Casanova  Age:  82 y.o., Sex:  male  Medical Record #: [id number]  Today's Date: 11/6/2023 11/06/23 1434   Interdisciplinary Plan of Care Collaboration   Collaboration Comments Attempted OT tx session, HOLD 2/2 soft BP, will re approach as able/appropriate, SM

## 2023-11-06 NOTE — DISCHARGE PLANNING
LMSW contacted [facility]BR[facility] for d/c summary and d/c order as it is needed for the discharge appeal. Pending order and summary. LMSW to f/u with pt and medical team.    Discussed pt during IDT rounds. Per [facility] MEMBR[facility], pt is no longer medically cleared.

## 2023-11-07 PROCEDURE — 700102 HCHG RX REV CODE 250 W/ 637 OVERRIDE(OP)

## 2023-11-07 PROCEDURE — 770020 HCHG ROOM/CARE - TELE (206)

## 2023-11-07 PROCEDURE — A9270 NON-COVERED ITEM OR SERVICE: HCPCS | Performed by: STUDENT IN AN ORGANIZED HEALTH CARE EDUCATION/TRAINING PROGRAM

## 2023-11-07 PROCEDURE — 700102 HCHG RX REV CODE 250 W/ 637 OVERRIDE(OP): Performed by: STUDENT IN AN ORGANIZED HEALTH CARE EDUCATION/TRAINING PROGRAM

## 2023-11-07 PROCEDURE — 97116 GAIT TRAINING THERAPY: CPT

## 2023-11-07 PROCEDURE — 92526 ORAL FUNCTION THERAPY: CPT

## 2023-11-07 PROCEDURE — 99233 SBSQ HOSP IP/OBS HIGH 50: CPT | Performed by: INTERNAL MEDICINE

## 2023-11-07 PROCEDURE — 700111 HCHG RX REV CODE 636 W/ 250 OVERRIDE (IP): Performed by: STUDENT IN AN ORGANIZED HEALTH CARE EDUCATION/TRAINING PROGRAM

## 2023-11-07 PROCEDURE — 700102 HCHG RX REV CODE 250 W/ 637 OVERRIDE(OP): Performed by: INTERNAL MEDICINE

## 2023-11-07 PROCEDURE — 97535 SELF CARE MNGMENT TRAINING: CPT | Mod: CO

## 2023-11-07 PROCEDURE — A9270 NON-COVERED ITEM OR SERVICE: HCPCS | Performed by: INTERNAL MEDICINE

## 2023-11-07 PROCEDURE — A9270 NON-COVERED ITEM OR SERVICE: HCPCS

## 2023-11-07 PROCEDURE — 97530 THERAPEUTIC ACTIVITIES: CPT

## 2023-11-07 RX ORDER — DABIGATRAN ETEXILATE 150 MG/1
150 CAPSULE ORAL 2 TIMES DAILY
Status: DISCONTINUED | OUTPATIENT
Start: 2023-11-07 | End: 2023-11-10 | Stop reason: HOSPADM

## 2023-11-07 RX ORDER — MIDODRINE HYDROCHLORIDE 5 MG/1
5 TABLET ORAL
Status: DISCONTINUED | OUTPATIENT
Start: 2023-11-07 | End: 2023-11-08

## 2023-11-07 RX ADMIN — ROTIGOTINE 4 MG: 4 PATCH, EXTENDED RELEASE TRANSDERMAL at 17:27

## 2023-11-07 RX ADMIN — MIDODRINE HYDROCHLORIDE 5 MG: 5 TABLET ORAL at 13:11

## 2023-11-07 RX ADMIN — MIDODRINE HYDROCHLORIDE 5 MG: 5 TABLET ORAL at 17:43

## 2023-11-07 RX ADMIN — POLYETHYLENE GLYCOL 3350 1 PACKET: 17 POWDER, FOR SOLUTION ORAL at 06:00

## 2023-11-07 RX ADMIN — FINASTERIDE 5 MG: 5 TABLET, FILM COATED ORAL at 06:25

## 2023-11-07 RX ADMIN — DOCUSATE SODIUM 50 MG AND SENNOSIDES 8.6 MG 2 TABLET: 8.6; 5 TABLET, FILM COATED ORAL at 06:25

## 2023-11-07 RX ADMIN — DOCUSATE SODIUM 100 MG: 100 CAPSULE, LIQUID FILLED ORAL at 06:24

## 2023-11-07 RX ADMIN — Medication 10 MG: at 20:26

## 2023-11-07 RX ADMIN — TAMSULOSIN HYDROCHLORIDE 0.4 MG: 0.4 CAPSULE ORAL at 10:17

## 2023-11-07 RX ADMIN — OMEPRAZOLE 20 MG: 20 CAPSULE, DELAYED RELEASE ORAL at 06:25

## 2023-11-07 RX ADMIN — POTASSIUM CHLORIDE 40 MEQ: 1500 TABLET, EXTENDED RELEASE ORAL at 13:12

## 2023-11-07 RX ADMIN — DOCUSATE SODIUM 100 MG: 100 CAPSULE, LIQUID FILLED ORAL at 17:28

## 2023-11-07 RX ADMIN — PREDNISONE 5 MG: 5 TABLET ORAL at 06:26

## 2023-11-07 RX ADMIN — LEVOTHYROXINE, LIOTHYRONINE 60 MG: 19; 4.5 TABLET ORAL at 06:22

## 2023-11-07 RX ADMIN — POTASSIUM CHLORIDE 40 MEQ: 1500 TABLET, EXTENDED RELEASE ORAL at 17:27

## 2023-11-07 RX ADMIN — DIGOXIN 62.5 MCG: 0.12 TABLET ORAL at 17:28

## 2023-11-07 RX ADMIN — SIMETHICONE 125 MG: 125 TABLET, CHEWABLE ORAL at 20:25

## 2023-11-07 RX ADMIN — POTASSIUM CHLORIDE 40 MEQ: 1500 TABLET, EXTENDED RELEASE ORAL at 06:25

## 2023-11-07 RX ADMIN — DOCUSATE SODIUM 50 MG AND SENNOSIDES 8.6 MG 2 TABLET: 8.6; 5 TABLET, FILM COATED ORAL at 17:27

## 2023-11-07 RX ADMIN — METOPROLOL SUCCINATE 12.5 MG: 25 TABLET, EXTENDED RELEASE ORAL at 06:24

## 2023-11-07 RX ADMIN — OMEPRAZOLE 20 MG: 20 CAPSULE, DELAYED RELEASE ORAL at 17:28

## 2023-11-07 ASSESSMENT — COGNITIVE AND FUNCTIONAL STATUS - GENERAL
SUGGESTED CMS G CODE MODIFIER DAILY ACTIVITY: CK
TURNING FROM BACK TO SIDE WHILE IN FLAT BAD: A LOT
WALKING IN HOSPITAL ROOM: A LOT
DRESSING REGULAR UPPER BODY CLOTHING: A LITTLE
SUGGESTED CMS G CODE MODIFIER MOBILITY: CL
PERSONAL GROOMING: A LITTLE
MOBILITY SCORE: 11
DAILY ACTIVITIY SCORE: 14
STANDING UP FROM CHAIR USING ARMS: A LOT
CLIMB 3 TO 5 STEPS WITH RAILING: TOTAL
EATING MEALS: A LOT
TOILETING: A LOT
MOVING FROM LYING ON BACK TO SITTING ON SIDE OF FLAT BED: A LOT
MOVING TO AND FROM BED TO CHAIR: A LOT
HELP NEEDED FOR BATHING: A LOT
DRESSING REGULAR LOWER BODY CLOTHING: A LOT

## 2023-11-07 ASSESSMENT — ENCOUNTER SYMPTOMS
FEVER: 0
NAUSEA: 0
COUGH: 0
CHILLS: 0
SINUS PAIN: 0
DIARRHEA: 0
MYALGIAS: 1
VOMITING: 0
SHORTNESS OF BREATH: 0
ABDOMINAL PAIN: 0
FALLS: 1

## 2023-11-07 ASSESSMENT — GAIT ASSESSMENTS
GAIT LEVEL OF ASSIST: MODERATE ASSIST
ASSISTIVE DEVICE: FRONT WHEEL WALKER
DISTANCE (FEET): 10
DISTANCE (FEET): 4
DEVIATION: DECREASED BASE OF SUPPORT;OTHER (COMMENT)

## 2023-11-07 ASSESSMENT — CHA2DS2 SCORE
PRIOR STROKE OR TIA OR THROMBOEMBOLISM: NO
SEX: MALE
CHF OR LEFT VENTRICULAR DYSFUNCTION: NO
VASCULAR DISEASE: YES
CHA2DS2 VASC SCORE: 3
DIABETES: NO
AGE 75 OR GREATER: YES
AGE 65 TO 74: NO
HYPERTENSION: NO

## 2023-11-07 ASSESSMENT — PAIN DESCRIPTION - PAIN TYPE: TYPE: ACUTE PAIN

## 2023-11-07 ASSESSMENT — FIBROSIS 4 INDEX: FIB4 SCORE: 1.22

## 2023-11-07 NOTE — PROGRESS NOTES
Hospital Medicine Daily Progress Note    Date of Service  11/6/2023    Chief Complaint  Elias Casanova is a 82 y.o. male admitted 10/26/2023 with NSTEMI, rhabdomyolysis     Hospital Course  This is a  82 y.o. male who was transferred from outside facility 10/26/2023 with rhabdomyolysis and elevated troponin.  PMH of A-fib on anticoagulation, Parkinson disease, hypertension, pulmonary hypertension  He was found on the ground, says he had fallen out of bed, he does not believe he lost consciousness.  He was too weak to get back up and thinks he was on the ground for about 12 hours or more.  He is found to have rhabdomyolysis at outside facility with CPK of 1100. He was also found to have elevated troponin and was sent here for high level of care   denies chest pain, troponin trending up, and his BP is low. metoprolol as his SBP went as low as 70.   His CPK also trended up so he has been started on IV fluid, but will need to be carefull as patient has already lower extremity edema.  Cardiology was consulted for NSTEMI and he was been started on heparin drip, an echo ordered   His sister is at bed side and discussed patient regarding goals of care, code status changed to DNR/DNI   Restarted back his parkinson medication   troponin up to around 1600. Cont on heparin drip.  Hypotension, he has h/o of adrenal insufficiency, transitioned to IV hydrocortisone for stress dose steroids  hemoglobin has dropped down to 6.5 this AM, received a unit of RBC, stoping heparin drip and also holding aspirin for now.  Started on lasix as patient was fluid overload.  A rapid was called, CXr concern for pneumonia started on IV abx.  given a unit of blood and this brought his hemoglobin up to 9.4 so unclear if that 6.5 was an error.  ordered for FOBT to r/o any GI bleed, he was constipated for a few days, increasing bowel regimen and he was able to have brown smears, no melena or red blood in stool.   per cardiology no antiplatelet or   OAC until his anemia source clarified. Hgb has been jumping from 6.5 to the 10's since arrival with no clear pattern.  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   On 11/1, patient had pulse overnight up to 160s, not sustained however tachycardic, systolic blood pressure down to the 80s.  Metoprolol was unable to be given since the hypotension.  net -2 L and appears dry, electrolytes replaced, IV fluid bolus given.  Bowel regimen increased.  Hoping to improve blood pressure with the IV fluids with hopes of restarting metoprolol to better control A-fib with RVR.  I had a long discussion with the patient and his sister at bedside, patient is agreeable to SNF when medically cleared.  No obvious GI bleeding, we discussed GI consultation he is not particularly interested in pursuing colonoscopy at this time, particularly given weak evidence suggesting possible ongoing GI bleed.  will follow-up with oncology/endocrinologist as outpatient to discuss his light chain abnormalities.  Remains high risk of clinical deterioration and high risk of mortality, prognosis guarded.    Interval Problem Update  11/2  Overnight heart rate went up to the 160s, sustained, started on p.o. metoprolol 12.5 as well as IV metoprolol push.  Given overnight and this morning heart rate was down improved to 105, this morning blood pressure came down to the 70s and 80s systolic for a bit.  After couple hours heart rate improved from 80s to low 100s systolic blood pressure improved to the upper 80s low 90s maps greater than 65.  We will continue with the metoprolol 12.5 mg daily in hopes that blood pressure will tolerate so that he can control his heart rate.  Volume status is difficult, bilateral lower extremity edema has been pretty chronic but now  worse, hesitant to give IV fluids.  Did not receive any today and his blood pressure improved as did heart rate with just a bit of time.  Once he shows 24 hours stable vitals heart rate and blood pressure can proceed with discharge. Patient and sister updated at bedside.     11/3  Patient with improved HR and BP on low dose metoprolol. No tachycardia or hypotension today. Hgb still jumping around but no bleeding noted. Will proceed with discharge tomorrow morning if no further episodes of RVR or significant hypotension that have been occurring almost nightly for the last few days. Discussed with family at bedside.    11/4  Metoprolol morning dose held due to SBP in low 100's, was tachycardic following that. Parameters again adjusted, metoprolol given and HR improved. Had some episodes of SBP in the 80's but asymptomatic. Late in the afternoon we received acceptance for SNF, Unclear if he can be admitted there tomorrow or if it has to be Monday based on their admission abilities on the weekend. Continue to monitor BP and HR closely.     11/5  I was notified by case management this AM he is appealing his discharge, will proceed with normal process.  Edward is feeling a bit better today. He continues to feel weak but had a shave and a good bath with staff and he is feeling pretty good. He has had some low BP readings but asymptomatic. Replace magnesium. Metoprolol parameters adjusted. Almost 3 L UOP day prior, net negative total almost 9L, will give some back with IVF today, bot the best PO intake, should help BP as well.     11/6  UOP has been rather large on some days, net negative almost 10 L since admission without ongoing IV diuresis. Gave some fluids back today to improve SBP. He has been having lots of SBP's in the 80's to low 90's. When metoprolol is held for hypotension tachycardia show up. Discussed with pharmacy and will trial dig as well in hopes of rate control without massively affecting BP. Ready to DC to  SNF in [place] when SBP and HR stable. Appealed his discharge. Cardiology recommended not continuing his OAC unless finding a source of presumed bleeding however no further e/o GI bleeding observed. He has been constipated and having brown stool. He would like to restart his pradaxa, I discussed with him I was concerned about bleeds, he said he was more concerned about a stroke and would like me to restart it.   11/7: Patient seen and examined, afebrile, BP low today, SBP down to 60-70. Will start midodrine  Anemia: hemoglobin still fluctuates is 8 today , will hold off on pradaxa for now  Prognosis is guarded   Close monitoring on tele for decompensation   I have discussed this patient's plan of care and discharge plan at IDT rounds today with Case Management, Nursing, Nursing leadership, and other members of the IDT team.    Consultants/Specialty  cardiology    Code Status  DNAR/DNI    Disposition  The patient is not medically cleared for discharge to home or a post-acute facility.      I have placed the appropriate orders for post-discharge needs.    Review of Systems  Review of Systems   Constitutional:  Negative for chills and fever.   HENT:  Negative for congestion, nosebleeds and sinus pain.    Respiratory:  Negative for cough and shortness of breath.    Cardiovascular:  Negative for chest pain.   Gastrointestinal:  Negative for abdominal pain, diarrhea, nausea and vomiting.   Genitourinary:  Negative for dysuria and urgency.   Musculoskeletal:  Positive for falls and myalgias.   All other systems reviewed and are negative.       Physical Exam  Temp:  [36.2 °C (97.1 °F)-37 °C (98.6 °F)] 37 °C (98.6 °F)  Pulse:  [] 92  Resp:  [16-20] 16  BP: ()/(48-88) 92/69  SpO2:  [92 %-98 %] 93 %    Physical Exam  Constitutional:       General: He is not in acute distress.     Appearance: Normal appearance. He is not toxic-appearing.   HENT:      Head: Normocephalic and atraumatic.      Mouth/Throat:      Mouth:  Mucous membranes are moist.      Pharynx: Oropharynx is clear. No oropharyngeal exudate or posterior oropharyngeal erythema.   Eyes:      General: No scleral icterus.  Cardiovascular:      Rate and Rhythm: Normal rate. Rhythm irregular.      Pulses: Normal pulses.      Heart sounds: Normal heart sounds. No murmur heard.  Pulmonary:      Effort: Pulmonary effort is normal. No respiratory distress.      Breath sounds: Normal breath sounds. No wheezing.   Abdominal:      Palpations: Abdomen is soft.      Tenderness: There is no abdominal tenderness. There is no guarding or rebound.   Musculoskeletal:         General: No swelling or tenderness. Normal range of motion.      Cervical back: Normal range of motion.      Right lower leg: Edema present.      Left lower leg: Edema present.   Skin:     General: Skin is warm and dry.   Neurological:      General: No focal deficit present.      Mental Status: He is alert and oriented to person, place, and time. Mental status is at baseline.      Motor: Weakness (generalized) present.      Coordination: Coordination abnormal.   Psychiatric:         Mood and Affect: Mood normal.         Behavior: Behavior normal.         Thought Content: Thought content normal.         Judgment: Judgment normal.         Fluids    Intake/Output Summary (Last 24 hours) at 11/7/2023 1512  Last data filed at 11/7/2023 1300  Gross per 24 hour   Intake 1030 ml   Output 2050 ml   Net -1020 ml       Laboratory  Recent Labs     11/06/23  0145   WBC 9.7   RBC 2.99*   HEMOGLOBIN 8.0*   HEMATOCRIT 27.1*   MCV 90.6   MCH 26.8*   MCHC 29.5*   RDW 53.6*   PLATELETCT 322   MPV 9.8     Recent Labs     11/06/23  0145   SODIUM 137   POTASSIUM 5.0   CHLORIDE 106   CO2 24   GLUCOSE 89   BUN 21   CREATININE 0.72   CALCIUM 7.7*                       Imaging  DX-ESOPHAGUS - [id number]   Final Result      EC-ECHOCARDIOGRAM COMPLETE W/O CONT   Final Result      DX-CHEST-PORTABLE (1 VIEW)   Final Result      1.   Hypoinflation with elevated LEFT hemidiaphragm and LEFT lung base atelectasis or pneumonia.   2.  Probable mild edema.   3.  No pneumothorax.           Assessment/Plan  * NSTEMI (non-ST elevated myocardial infarction) (HCC)- (present on admission)  Assessment & Plan  Troponin significantly elevated at outside facility and trending up, in the 600s here  EKG from transferring facility not concerning for acute ischemia, repeat EKG here ordered  Patient fell out of bed and was unable to get up, he does have a history of Parkinson's disease.  Unclear if he had a coronary event that resulted in him falling  I have consulted cardiology and discussed case with Dr. Kelly   Will continue on heparin drip for now, echo has been ordered since troponin trending up   No cardiac cath at this time as patient not a good candidate for it.  He declined echo initially now family at bedside and we had discussion, with patient sister who will discuss with him again to see if she can convince him to have the echo   close monitoring on tele   Echo showing ischemia/ MI on the LAD territory   Cardiology following  As per cardiology to hold off on his antiplatelet or OAC forhis A.fibb due to his anemia  I have ordered a FOBT to r/o GI bleed     Advance care planning  Assessment & Plan  His sister is now at bedside and we had a long discussion with patient and his sister regarding goals of care.  Since patient has declined echo today we discussed regarding code status and also comfort care and hospice  Will change patient to DNR/DNI for now , no hospice or comfort care  Sister will talk and try to convince him to see if he is willing to have the echo   More then 18 min spent on discussion for goals of care alone       Anemia- (present on admission)  Assessment & Plan  Baseline hemoglobin 12-14.  Was 8.9 on presentation.    His OAC and antiplatelet have been held. I have ordered for FOBT to r/o GI bleed if that positive will consult GI   His  hemoglobin yesterday was 6.5 and got 1 unit of RBC which brought his hemoglobin up to 9.4 uncleare if the 6.5 was an eror or possible due to his fluid overload   I have ordered for FOBT if that is positive will consult GI     Non-traumatic rhabdomyolysis- (present on admission)  Assessment & Plan  CPK continues to trend up starting IV fluid but need to be careful  as patient is edematous   This was also discussed with family     Persistent atrial fibrillation (HCC)- (present on admission)  Assessment & Plan  Follows with cardiology, holding metoprolol as his BP is low   Holding pradaxa as he is on heparin drip     Parkinson disease- (present on admission)  Assessment & Plan  Cont outpatient regimen   PT, OT ordered       VTE prophylaxis: scd    The very real possibilty of a deterioration of this patient's condition required the highest level of my preparedness for sudden, emergent intervention.  I provided services, which included medication orders, frequent reevaluations of the patient's condition and response to treatment, ordering and reviewing test results, and discussing the case with various consultants.  The  time associated with the care of the patient was 51 minutes.            I have performed a physical exam and reviewed and updated ROS and Plan today (11/6/2023). In review of yesterday's note (11/5/2023), there are no changes except as documented above.

## 2023-11-07 NOTE — THERAPY
"Occupational Therapy  Daily Treatment     Patient Name: Elias Casanova  Age:  82 y.o., Sex:  male  Medical Record #: [id number]  Today's Date: 11/7/2023     Precautions  Precautions: Fall Risk, Swallow Precautions  Comments: hx PD    Assessment    Pt was seen for OT treatment. Pt was seated EOB with PT just prior to OT treatment. Pt demo increased sitting balance and dynamic sitting balance. Pt able to doff gown with SBA and donned with SBA. Pt attempted LB dressing for socks only.  Pt has a condom cath on and stated he did not want to try pants at this time. Pt able to attempt sock doffing using [employer] tech. Pt able to use Bilateral hands to doff each socks. Pt tried to don socks but was unable and declined to use sock aid. Needed socks to be donned over toes and pt able to complete pulling socks up over heels with Mepilex heel protectors and ankles without assist. Pt STS X1 with OT for less than 10 secs with Mod A . H/G tasks done seated EOB post set up with SBA. Nursing informed OT that pt is self feeding without assist but does need food cut up prior to self feeding. Pt is making progress towards OT STG's. RN updated on OT treatment findings and recommendations . Will continue to follow.     Plan    Treatment Plan Status: (P) Continue Current Treatment Plan  Type of Treatment: Self Care / Activities of Daily Living, Adaptive Equipment, Manual Therapy Techniques, Neuro Re-Education / Balance, Therapeutic Exercises, Therapeutic Activity  Treatment Frequency: 4 Times per Week  Treatment Duration: Until Therapy Goals Met    DC Equipment Recommendations: (P) Unable to determine at this time  Discharge Recommendations: (P) Recommend post-acute placement for additional occupational therapy services prior to discharge home    Subjective    \"I will try to get my socks off and on\". \"Thank your for helping me.\" \"You have a sweet spirit\".      Objective       11/07/23 0918   Cognition    Cognition / Consciousness X   Speech/ " Communication Delayed Responses   Level of Consciousness Alert   Ability To Follow Commands 1 Step   Safety Awareness Impaired  (slightly improving)   New Learning Impaired  (improving)   Comments Pt is pleasant & cooperative, and motivated in therapy.  Speech mildly difficult to understand at times.  Hypophonic   Passive ROM Upper Body   Passive ROM Upper Body WDL   Comments WFL   Active ROM Upper Body   Active ROM Upper Body  WDL   Dominant Hand Right   Comments WFL for simple self care tasks and is slightly limited by weakness.   Strength Upper Body   Upper Body Strength  X   Gross Strength Generalized Weakness, Equal Bilaterally.    Comments slight increase in UB strength as demo with simple self care tasks.   Other Treatments   Other Treatments Provided Psychosocial intervention addressed. Educated pt in the importance of attempting simple self care tasks to increase strength, and general endurance needed ADL's and ADL transfers. Pt agreed to stay OOB bed and up in chair today.   Balance   Sitting Balance (Static) Fair   Sitting Balance (Dynamic) Fair -   Standing Balance (Static) Fair -   Standing Balance (Dynamic) Trace +   Weight Shift Sitting Fair   Weight Shift Standing Poor   Skilled Intervention Verbal Cuing;Tactile Cuing;Sequencing;Postural Facilitation;Compensatory Strategies   Comments HHA   Bed Mobility    Comments Pt seen by OT at EOB. Bed mobility not assessed.   Activities of Daily Living   Grooming Standby Assist;Seated   Bathing   (declined)   Upper Body Dressing Minimal Assist   Lower Body Dressing Moderate Assist   Toileting Total Assist  (condom cath in place. No need for BM at this time. Nursing has been doing full toilet hygiene in bed.)   Skilled Intervention Verbal Cuing;Tactile Cuing;Sequencing;Postural Facilitation;Compensatory Strategies   Comments Pt will need assist for most self care tasks at this time and all I ADL's and ADL transfers.   Functional Mobility   Sit to Stand Moderate  Assist   Bed, Chair, Wheelchair Transfer Maximal Assist   Toilet Transfers Unable to Participate   Transfer Method Stand Pivot   Mobility STS X2   Comments HHA  slow movement and to process MP   Activity Tolerance   Comments fatigued with activity and STS. Low activity tolerance.   Patient / Family Goals   Patient / Family Goal #1 to get better   Goal #1 Outcome Progressing as expected   Short Term Goals   Short Term Goal # 1 pt will compelete grooming standing at sink w/spv   Goal Outcome # 1 Goal not met   Short Term Goal # 2 pt will complete LB dressing w/spv   Goal Outcome # 2 Progressing slower than expected   Short Term Goal # 3 pt will toilet txf w/spv   Goal Outcome # 3 Progressing slower than expected   Occupational Therapy Treatment Plan    O.T. Treatment Plan Continue Current Treatment Plan   Anticipated Discharge Equipment and Recommendations   DC Equipment Recommendations Unable to determine at this time   Discharge Recommendations Recommend post-acute placement for additional occupational therapy services prior to discharge home   Interdisciplinary Plan of Care Collaboration   IDT Collaboration with  Nursing;Physical Therapist;Certified Nursing Assistant   Collaboration Comments RN updated

## 2023-11-07 NOTE — CARE PLAN
The patient is Stable - Low risk of patient condition declining or worsening    Shift Goals  Clinical Goals: Monitor HR, BP  Patient Goals: Go home  Family Goals: sleep    Progress made toward(s) clinical / shift goals:    Problem: Knowledge Deficit - Standard  Goal: Patient and family/care givers will demonstrate understanding of plan of care, disease process/condition, diagnostic tests and medications  Outcome: Progressing     Problem: Skin Integrity  Goal: Skin integrity is maintained or improved  Outcome: Progressing     Problem: Fall Risk  Goal: Patient will remain free from falls  Outcome: Progressing     Problem: Fluid Volume  Goal: Fluid volume balance will be maintained  Outcome: Progressing     Problem: Nutrition  Goal: Patient's nutritional and fluid intake will be adequate or improve  Outcome: Progressing     Problem: Urinary Elimination  Goal: Establish and maintain regular urinary output  Outcome: Progressing     Problem: Bowel Elimination  Goal: Establish and maintain regular bowel function  Outcome: Progressing     Problem: Mobility  Goal: Patient's capacity to carry out activities will improve  Outcome: Progressing     Problem: Self Care  Goal: Patient will have the ability to perform ADLs independently or with assistance (bathe, groom, dress, toilet and feed)  Outcome: Progressing     Problem: Communication  Goal: The ability to communicate needs accurately and effectively will improve  Outcome: Progressing     Problem: Hemodynamics  Goal: Patient's hemodynamics, fluid balance and neurologic status will be stable or improve  Outcome: Progressing     Problem: Respiratory  Goal: Patient will achieve/maintain optimum respiratory ventilation and gas exchange  Outcome: Progressing       Patient is not progressing towards the following goals:

## 2023-11-07 NOTE — THERAPY
Physical Therapy   Daily Treatment     Patient Name: Elias Casanova  Age:  82 y.o., Sex:  male  Medical Record #: [id number]  Today's Date: 11/7/2023     Precautions  Precautions: Fall Risk;Swallow Precautions  Comments: Hx of PD    Assessment    Pt showing increased stiffness, flexed posture, poor balance with heavy posterior lean in standing. See details below, needs more time out of bed. Pt transferred to  today with removable arm rest to allow easier, safer time out of bed.     Plan    Treatment Plan Status: Continue Current Treatment Plan  Type of Treatment: Bed Mobility, Gait Training, Neuro Re-Education / Balance, Self Care / Home Evaluation, Stair Training, Therapeutic Activities, Therapeutic Exercise  Treatment Frequency: 4 Times per Week  Treatment Duration: Until Therapy Goals Met    DC Equipment Recommendations: Unable to determine at this time  Discharge Recommendations: Recommend post-acute placement for additional physical therapy services prior to discharge home           Objective       11/07/23 0910   Charge Group   Charges  Yes   PT Gait Training (Units) 1   PT Therapeutic Activities (Units) 2   Total Time Spent   PT Total Time Yes   PT Gait Training Time Spent (Mins) 8   PT Therapeutic Activities Time Spent (Mins) 23   PT Total Time Spent (Calculated) 31   Precautions   Precautions Fall Risk;Swallow Precautions   Comments hx of PD   Vitals   O2 Delivery Device None - Room Air   Pain 0 - 10 Group   Therapist Pain Assessment 0;During Activity;Nurse Notified   Cognition    Cognition / Consciousness X   Speech/ Communication Dysarthric  (mumbles, some words difficult to understand)   Level of Consciousness Alert   Ability To Follow Commands 1 Step   Comments pt reports feeling aware that his weight is back on heels, feels like he might fall, needs assist to  neutral.   Active ROM Lower Body    Active ROM Lower Body  X   Comments knees remain flexed in standing   Strength Lower Body   Lower  Body Strength  X   Gross Strength Generalized Weakness, Equal Bilaterally   Neurological Concerns   Neurological Concerns Yes   Comments very pronounced PD symptoms   Balance   Sitting Balance (Static) Fair -   Sitting Balance (Dynamic) Fair -   Standing Balance (Static) Trace +  (heavy posterior lean)   Standing Balance (Dynamic) Trace +   Weight Shift Sitting Fair   Weight Shift Standing Poor   Skilled Intervention Verbal Cuing;Sequencing   Comments with FWW   Bed Mobility    Supine to Sit Maximal Assist  (heavy assist for rolling to pt's R, to move legs to EOB and off mattress and for pushing up to sitting.)   Sit to Supine   (up in wc)   Scooting Moderate Assist  (struggles with seated scoot)   Rolling Maximal Assist to Rt.   Skilled Intervention Verbal Cuing;Sequencing;Facilitation   Gait Analysis   Gait Level Of Assist Moderate Assist   Assistive Device Front Wheel Walker   Distance (Feet) 10  (march in place at first, small forward and back steps, then side steps to right and left along EOB. Posterior lean is biggest problem.)   # of Times Distance was Traveled 1   Deviation Decreased Base Of Support;Other (Comment)  (posterior lean)   Skilled Intervention Verbal Cuing;Sequencing;Facilitation   Functional Mobility   Sit to Stand Maximal Assist  (first needed max, but better with each subsequent sit to stand.)   Bed, Chair, Wheelchair Transfer Moderate Assist  (slow, but able with sequencing and extra time to wc with removable arm)   Transfer Method Squat Pivot   Skilled Intervention Verbal Cuing;Sequencing   How much difficulty does the patient currently have...   Turning over in bed (including adjusting bedclothes, sheets and blankets)? 2   Sitting down on and standing up from a chair with arms (e.g., wheelchair, bedside commode, etc.) 2   Moving from lying on back to sitting on the side of the bed? 2   How much help from another person does the patient currently need...   Moving to and from a bed to a chair  (including a wheelchair)? 2   Need to walk in a hospital room? 2   Climbing 3-5 steps with a railing? 1   6 clicks Mobility Score 11   Activity Tolerance   Sitting in Chair 15+   Sitting Edge of Bed 15   Short Term Goals    Short Term Goal # 1 pt will perform supine <> sit with SPV in 6 visits for improved independence   Goal Outcome # 1 goal not met   Short Term Goal # 2 pt will perform all functional xfrs with SPV in 6 visits for improved independence   Goal Outcome # 2 Goal not met   Short Term Goal # 3 pt will ambulate 50ft with FWW and SPV in 6 visits for improved independence   Goal Outcome # 3 Goal not met   Education Group   Gait Training Patient Response Patient;Acceptance;Explanation;Action Demonstration   Additional Comments heavy sequencing needed for coming to EOB, transfers and ambulation.Ed done with CNA re transfer back to bed, ( remove arm rest etc)   Physical Therapy Treatment Plan   Physical Therapy Treatment Plan Continue Current Treatment Plan   Anticipated Discharge Equipment and Recommendations   DC Equipment Recommendations Unable to determine at this time   Discharge Recommendations Recommend post-acute placement for additional physical therapy services prior to discharge home   Interdisciplinary Plan of Care Collaboration   IDT Collaboration with  Nursing   Patient Position at End of Therapy Seated  (OT in at end to do treatment, CNA updated re safe back to bed transfer.)   Collaboration Comments an updated   Session Information   Date / Session Number  11/7-4 (1/4, 11/10)

## 2023-11-07 NOTE — PROGRESS NOTES
Hospital Medicine Daily Progress Note    Date of Service  11/6/2023    Chief Complaint  Elias Casanova is a 82 y.o. male admitted 10/26/2023 with NSTEMI, rhabdomyolysis     Hospital Course  This is a  82 y.o. male who was transferred from outside facility 10/26/2023 with rhabdomyolysis and elevated troponin.  PMH of A-fib on anticoagulation, Parkinson disease, hypertension, pulmonary hypertension  He was found on the ground, says he had fallen out of bed, he does not believe he lost consciousness.  He was too weak to get back up and thinks he was on the ground for about 12 hours or more.  He is found to have rhabdomyolysis at outside facility with CPK of 1100. He was also found to have elevated troponin and was sent here for high level of care   denies chest pain, troponin trending up, and his BP is low. metoprolol as his SBP went as low as 70.   His CPK also trended up so he has been started on IV fluid, but will need to be carefull as patient has already lower extremity edema.  Cardiology was consulted for NSTEMI and he was been started on heparin drip, an echo ordered   His sister is at bed side and discussed patient regarding goals of care, code status changed to DNR/DNI   Restarted back his parkinson medication   troponin up to around 1600. Cont on heparin drip.  Hypotension, he has h/o of adrenal insufficiency, transitioned to IV hydrocortisone for stress dose steroids  hemoglobin has dropped down to 6.5 this AM, received a unit of RBC, stoping heparin drip and also holding aspirin for now.  Started on lasix as patient was fluid overload.  A rapid was called, CXr concern for pneumonia started on IV abx.  given a unit of blood and this brought his hemoglobin up to 9.4 so unclear if that 6.5 was an error.  ordered for FOBT to r/o any GI bleed, he was constipated for a few days, increasing bowel regimen and he was able to have brown smears, no melena or red blood in stool.   per cardiology no antiplatelet or   OAC until his anemia source clarified. Hgb has been jumping from 6.5 to the 10's since arrival with no clear pattern.  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   On 11/1, patient had pulse overnight up to 160s, not sustained however tachycardic, systolic blood pressure down to the 80s.  Metoprolol was unable to be given since the hypotension.  net -2 L and appears dry, electrolytes replaced, IV fluid bolus given.  Bowel regimen increased.  Hoping to improve blood pressure with the IV fluids with hopes of restarting metoprolol to better control A-fib with RVR.  I had a long discussion with the patient and his sister at bedside, patient is agreeable to SNF when medically cleared.  No obvious GI bleeding, we discussed GI consultation he is not particularly interested in pursuing colonoscopy at this time, particularly given weak evidence suggesting possible ongoing GI bleed.  will follow-up with oncology/endocrinologist as outpatient to discuss his light chain abnormalities.  Remains high risk of clinical deterioration and high risk of mortality, prognosis guarded.    Interval Problem Update  11/2  Overnight heart rate went up to the 160s, sustained, started on p.o. metoprolol 12.5 as well as IV metoprolol push.  Given overnight and this morning heart rate was down improved to 105, this morning blood pressure came down to the 70s and 80s systolic for a bit.  After couple hours heart rate improved from 80s to low 100s systolic blood pressure improved to the upper 80s low 90s maps greater than 65.  We will continue with the metoprolol 12.5 mg daily in hopes that blood pressure will tolerate so that he can control his heart rate.  Volume status is difficult, bilateral lower extremity edema has been pretty chronic but now  worse, hesitant to give IV fluids.  Did not receive any today and his blood pressure improved as did heart rate with just a bit of time.  Once he shows 24 hours stable vitals heart rate and blood pressure can proceed with discharge. Patient and sister updated at bedside.     11/3  Patient with improved HR and BP on low dose metoprolol. No tachycardia or hypotension today. Hgb still jumping around but no bleeding noted. Will proceed with discharge tomorrow morning if no further episodes of RVR or significant hypotension that have been occurring almost nightly for the last few days. Discussed with family at bedside.    11/4  Metoprolol morning dose held due to SBP in low 100's, was tachycardic following that. Parameters again adjusted, metoprolol given and HR improved. Had some episodes of SBP in the 80's but asymptomatic. Late in the afternoon we received acceptance for SNF, Unclear if he can be admitted there tomorrow or if it has to be Monday based on their admission abilities on the weekend. Continue to monitor BP and HR closely.     11/5  I was notified by case management this AM he is appealing his discharge, will proceed with normal process.  Edward is feeling a bit better today. He continues to feel weak but had a shave and a good bath with staff and he is feeling pretty good. He has had some low BP readings but asymptomatic. Replace magnesium. Metoprolol parameters adjusted. Almost 3 L UOP day prior, net negative total almost 9L, will give some back with IVF today, bot the best PO intake, should help BP as well.     11/6  UOP has been rather large on some days, net negative almost 10 L since admission without ongoing IV diuresis. Gave some fluids back today to improve SBP. He has been having lots of SBP's in the 80's to low 90's. When metoprolol is held for hypotension tachycardia show up. Discussed with pharmacy and will trial dig as well in hopes of rate control without massively affecting BP. Ready to DC to  SNF in [place] when SBP and HR stable. Appealed his discharge. Cardiology recommended not continuing his OAC unless finding a source of presumed bleeding however no further e/o GI bleeding observed. He has been constipated and having brown stool. He would like to restart his pradaxa, I discussed with him I was concerned about bleeds, he said he was more concerned about a stroke and would like me to restart it.     I have discussed this patient's plan of care and discharge plan at IDT rounds today with Case Management, Nursing, Nursing leadership, and other members of the IDT team.    Consultants/Specialty  cardiology    Code Status  DNAR/DNI    Disposition  Medically Cleared  I have placed the appropriate orders for post-discharge needs.    Review of Systems  Review of Systems   Constitutional:  Negative for chills and fever.   HENT:  Negative for congestion, nosebleeds and sinus pain.    Respiratory:  Negative for cough and shortness of breath.    Cardiovascular:  Negative for chest pain.   Gastrointestinal:  Negative for abdominal pain, diarrhea, nausea and vomiting.   Genitourinary:  Negative for dysuria and urgency.   Musculoskeletal:  Positive for falls and myalgias.   All other systems reviewed and are negative.       Physical Exam  Temp:  [36.2 °C (97.1 °F)-36.8 °C (98.3 °F)] 36.8 °C (98.3 °F)  Pulse:  [] 103  Resp:  [18-21] 19  BP: ()/(58-88) 113/72  SpO2:  [92 %-100 %] 92 %    Physical Exam  Constitutional:       General: He is not in acute distress.     Appearance: Normal appearance. He is not toxic-appearing.   HENT:      Head: Normocephalic and atraumatic.      Mouth/Throat:      Mouth: Mucous membranes are moist.      Pharynx: Oropharynx is clear. No oropharyngeal exudate or posterior oropharyngeal erythema.   Eyes:      General: No scleral icterus.  Cardiovascular:      Rate and Rhythm: Normal rate. Rhythm irregular.      Pulses: Normal pulses.      Heart sounds: Normal heart sounds. No  murmur heard.  Pulmonary:      Effort: Pulmonary effort is normal. No respiratory distress.      Breath sounds: Normal breath sounds. No wheezing.   Abdominal:      Palpations: Abdomen is soft.      Tenderness: There is no abdominal tenderness. There is no guarding or rebound.   Musculoskeletal:         General: No swelling or tenderness. Normal range of motion.      Cervical back: Normal range of motion.      Right lower leg: Edema present.      Left lower leg: Edema present.   Skin:     General: Skin is warm and dry.   Neurological:      General: No focal deficit present.      Mental Status: He is alert and oriented to person, place, and time. Mental status is at baseline.      Motor: Weakness (generalized) present.      Coordination: Coordination abnormal.   Psychiatric:         Mood and Affect: Mood normal.         Behavior: Behavior normal.         Thought Content: Thought content normal.         Judgment: Judgment normal.         Fluids    Intake/Output Summary (Last 24 hours) at 11/7/2023 0612  Last data filed at 11/7/2023 0417  Gross per 24 hour   Intake 1170 ml   Output 1950 ml   Net -780 ml         Laboratory  Recent Labs     11/06/23  0145   WBC 9.7   RBC 2.99*   HEMOGLOBIN 8.0*   HEMATOCRIT 27.1*   MCV 90.6   MCH 26.8*   MCHC 29.5*   RDW 53.6*   PLATELETCT 322   MPV 9.8       Recent Labs     11/06/23  0145   SODIUM 137   POTASSIUM 5.0   CHLORIDE 106   CO2 24   GLUCOSE 89   BUN 21   CREATININE 0.72   CALCIUM 7.7*                         Imaging  DX-ESOPHAGUS - [id number]   Final Result      EC-ECHOCARDIOGRAM COMPLETE W/O CONT   Final Result      DX-CHEST-PORTABLE (1 VIEW)   Final Result      1.  Hypoinflation with elevated LEFT hemidiaphragm and LEFT lung base atelectasis or pneumonia.   2.  Probable mild edema.   3.  No pneumothorax.           Assessment/Plan  * NSTEMI (non-ST elevated myocardial infarction) ([facility])- (present on admission)  Assessment & Plan  Troponin significantly elevated at outside  facility and trending up, in the 600s here  EKG from transferring facility not concerning for acute ischemia, repeat EKG here ordered  Patient fell out of bed and was unable to get up, he does have a history of Parkinson's disease.  Unclear if he had a coronary event that resulted in him falling  I have consulted cardiology and discussed case with Dr. Kelly   Will continue on heparin drip for now, echo has been ordered since troponin trending up   No cardiac cath at this time as patient not a good candidate for it.  He declined echo initially now family at bedside and we had discussion, with patient sister who will discuss with him again to see if she can convince him to have the echo   close monitoring on tele   Echo showing ischemia/ MI on the LAD territory   Cardiology following  As per cardiology to hold off on his antiplatelet or OAC forhis A.fibb due to his anemia  I have ordered a FOBT to r/o GI bleed     Advance care planning  Assessment & Plan  His sister is now at bedside and we had a long discussion with patient and his sister regarding goals of care.  Since patient has declined echo today we discussed regarding code status and also comfort care and hospice  Will change patient to DNR/DNI for now , no hospice or comfort care  Sister will talk and try to convince him to see if he is willing to have the echo   More then 18 min spent on discussion for goals of care alone       Anemia- (present on admission)  Assessment & Plan  Baseline hemoglobin 12-14.  Was 8.9 on presentation.    His OAC and antiplatelet have been held. I have ordered for FOBT to r/o GI bleed if that positive will consult GI   His hemoglobin yesterday was 6.5 and got 1 unit of RBC which brought his hemoglobin up to 9.4 uncleare if the 6.5 was an eror or possible due to his fluid overload   I have ordered for FOBT if that is positive will consult GI     Non-traumatic rhabdomyolysis- (present on admission)  Assessment & Plan  CPK continues to  trend up starting IV fluid but need to be careful  as patient is edematous   This was also discussed with family     Persistent atrial fibrillation (HCC)- (present on admission)  Assessment & Plan  Follows with cardiology, holding metoprolol as his BP is low   Holding pradaxa as he is on heparin drip     Parkinson disease- (present on admission)  Assessment & Plan  Cont outpatient regimen   PT, OT ordered       VTE prophylaxis: scd    The very real possibilty of a deterioration of this patient's condition required the highest level of my preparedness for sudden, emergent intervention.  I provided  services, which included medication orders, frequent reevaluations of the patient's condition and response to treatment, ordering and reviewing test results, and discussing the case with various consultants.  The  time associated with the care of the patient was 52  minutes.         I have performed a physical exam and reviewed and updated ROS and Plan today (11/6/2023). In review of yesterday's note (11/5/2023), there are no changes except as documented above.

## 2023-11-07 NOTE — THERAPY
"Speech Language Pathology   Daily Treatment     Patient Name: Elias Casanova  AGE:  82 y.o., SEX:  male  Medical Record #: [id number]  Date of Service: 11/7/2023      Precautions:  Precautions: Fall Risk, Swallow Precautions         Subjective  Patient cleared by RN for session. Pt received awake, sitting UIC and agreeable with SLP tasks. Pt w/ good recall of MBS study and recommendations stating, \"I have not been doing the exercises but I have been swallowing twice.\" Pt endorsed wanting to continue diet despite known risk.      Assessment  Wet/gurgly vocal quality noted upon entry and intermittently during session, improved/ cleared with cued cough/ throat clear and re-swallow. Pt self-delivered PO trials of cranberry juice, chocolate pudding and dry cracker assessed. Pt able to independently complete swallow strategies (e.g., multiple swallows, small bites/sips, slow rate of intake).       Clinical Impressions  Patient presents with severe oropharyngeal dysphagia, per MBSS 10/31. Recommend continuation of regular solids and thin liquids diet despite known risk with use of strategies to maximize pulmonary hygiene including diligent oral care and mobility as pt is able. Service will no longer actively follow, re-consult with any change in staus.        Dysphagia Treatment  Diet Consistency: Regular solids/thin liquids despite known risk  Instrumentation: None indicated at this time  Medication: Crush with applesauce, as appropriate, Crush with pudding/puree, as appropriate  Supervision: Assist with meal tray set up, Distant supervision - check on patient 2-3 times per meal  Positioning: Fully upright and midline during oral intake, Meals sitting upright in a chair, as tolerated  Risk Management : Small bites/sips, Slow rate of intake, Physical mobility, as tolerated  Oral Care:  (At least TID after meals)                   Anticipated Discharge Needs  Discharge Recommendations: Recommend home health for continued " "speech therapy services  Therapy Recommendations Upon DC: Dysphagia Training, Patient / Family / Caregiver Education      Patient / Family Goals  Patient / Family Goal #1: \"It gets stuck sometimes.\"  Goal #1 Outcome: Goal met  Short Term Goals  Short Term Goal # 1 B : Pt will consume diet of RG/TN given strategies to minimize risk and maximize pulmonary hygiene.  Goal Outcome  # 1 B: Goal met  Short Term Goal # 2: Pt will complete exercises targeting hyolaryngeal movement, LVC, BOT retaction, and pharyngeal constriction/shortening x40 in a session given mod cues from SLP.  Goal Outcome # 2 : Goal met      Minal Garcia MS,CCC-SLP    "

## 2023-11-07 NOTE — CARE PLAN
The patient is Stable - Low risk of patient condition declining or worsening    Shift Goals  Clinical Goals: Monitor HR, BP  Patient Goals: Get better  Family Goals: AYDEN    Progress made toward(s) clinical / shift goals:  Patient updated on the plan of care. Patient calls for assistance and has call light within reach. Patient has been eating 50-75% of meals.    Problem: Fall Risk  Goal: Patient will remain free from falls  Outcome: Progressing     Problem: Nutrition  Goal: Enteral nutrition will be maintained or improve  Outcome: Progressing     Problem: Urinary Elimination  Goal: Establish and maintain regular urinary output  Outcome: Progressing       Patient is not progressing towards the following goals:

## 2023-11-08 LAB
ANION GAP SERPL CALC-SCNC: 9 MMOL/L (ref 7–16)
BUN SERPL-MCNC: 25 MG/DL (ref 8–22)
CALCIUM SERPL-MCNC: 8 MG/DL (ref 8.5–10.5)
CHLORIDE SERPL-SCNC: 104 MMOL/L (ref 96–112)
CO2 SERPL-SCNC: 24 MMOL/L (ref 20–33)
CREAT SERPL-MCNC: 0.8 MG/DL (ref 0.5–1.4)
ERYTHROCYTE [DISTWIDTH] IN BLOOD BY AUTOMATED COUNT: 55.3 FL (ref 35.9–50)
GFR SERPLBLD CREATININE-BSD FMLA CKD-EPI: 89 ML/MIN/1.73 M 2
GLUCOSE SERPL-MCNC: 77 MG/DL (ref 65–99)
HCT VFR BLD AUTO: 27 % (ref 42–52)
HGB BLD-MCNC: 7.8 G/DL (ref 14–18)
MCH RBC QN AUTO: 26.5 PG (ref 27–33)
MCHC RBC AUTO-ENTMCNC: 28.9 G/DL (ref 32.3–36.5)
MCV RBC AUTO: 91.8 FL (ref 81.4–97.8)
PLATELET # BLD AUTO: 340 K/UL (ref 164–446)
PMV BLD AUTO: 10.6 FL (ref 9–12.9)
POTASSIUM SERPL-SCNC: 4.9 MMOL/L (ref 3.6–5.5)
RBC # BLD AUTO: 2.94 M/UL (ref 4.7–6.1)
SODIUM SERPL-SCNC: 137 MMOL/L (ref 135–145)
WBC # BLD AUTO: 9.9 K/UL (ref 4.8–10.8)

## 2023-11-08 PROCEDURE — 700102 HCHG RX REV CODE 250 W/ 637 OVERRIDE(OP): Performed by: STUDENT IN AN ORGANIZED HEALTH CARE EDUCATION/TRAINING PROGRAM

## 2023-11-08 PROCEDURE — 80048 BASIC METABOLIC PNL TOTAL CA: CPT

## 2023-11-08 PROCEDURE — 36415 COLL VENOUS BLD VENIPUNCTURE: CPT

## 2023-11-08 PROCEDURE — 770020 HCHG ROOM/CARE - TELE (206)

## 2023-11-08 PROCEDURE — 99233 SBSQ HOSP IP/OBS HIGH 50: CPT | Performed by: INTERNAL MEDICINE

## 2023-11-08 PROCEDURE — 85027 COMPLETE CBC AUTOMATED: CPT

## 2023-11-08 PROCEDURE — 700102 HCHG RX REV CODE 250 W/ 637 OVERRIDE(OP)

## 2023-11-08 PROCEDURE — A9270 NON-COVERED ITEM OR SERVICE: HCPCS | Performed by: STUDENT IN AN ORGANIZED HEALTH CARE EDUCATION/TRAINING PROGRAM

## 2023-11-08 PROCEDURE — A9270 NON-COVERED ITEM OR SERVICE: HCPCS

## 2023-11-08 PROCEDURE — 700111 HCHG RX REV CODE 636 W/ 250 OVERRIDE (IP): Performed by: STUDENT IN AN ORGANIZED HEALTH CARE EDUCATION/TRAINING PROGRAM

## 2023-11-08 PROCEDURE — 700102 HCHG RX REV CODE 250 W/ 637 OVERRIDE(OP): Performed by: INTERNAL MEDICINE

## 2023-11-08 PROCEDURE — A9270 NON-COVERED ITEM OR SERVICE: HCPCS | Performed by: INTERNAL MEDICINE

## 2023-11-08 RX ORDER — MIDODRINE HYDROCHLORIDE 5 MG/1
10 TABLET ORAL
Status: DISCONTINUED | OUTPATIENT
Start: 2023-11-08 | End: 2023-11-10 | Stop reason: HOSPADM

## 2023-11-08 RX ADMIN — DOCUSATE SODIUM 100 MG: 100 CAPSULE, LIQUID FILLED ORAL at 05:03

## 2023-11-08 RX ADMIN — PREDNISONE 5 MG: 5 TABLET ORAL at 05:03

## 2023-11-08 RX ADMIN — LEVOTHYROXINE, LIOTHYRONINE 60 MG: 19; 4.5 TABLET ORAL at 05:04

## 2023-11-08 RX ADMIN — POTASSIUM CHLORIDE 40 MEQ: 1500 TABLET, EXTENDED RELEASE ORAL at 05:03

## 2023-11-08 RX ADMIN — FINASTERIDE 5 MG: 5 TABLET, FILM COATED ORAL at 05:03

## 2023-11-08 RX ADMIN — OMEPRAZOLE 20 MG: 20 CAPSULE, DELAYED RELEASE ORAL at 17:37

## 2023-11-08 RX ADMIN — MIDODRINE HYDROCHLORIDE 10 MG: 5 TABLET ORAL at 17:37

## 2023-11-08 RX ADMIN — MIDODRINE HYDROCHLORIDE 10 MG: 5 TABLET ORAL at 13:36

## 2023-11-08 RX ADMIN — ROTIGOTINE 4 MG: 4 PATCH, EXTENDED RELEASE TRANSDERMAL at 17:37

## 2023-11-08 RX ADMIN — DOCUSATE SODIUM 50 MG AND SENNOSIDES 8.6 MG 2 TABLET: 8.6; 5 TABLET, FILM COATED ORAL at 05:02

## 2023-11-08 RX ADMIN — POTASSIUM CHLORIDE 40 MEQ: 1500 TABLET, EXTENDED RELEASE ORAL at 13:35

## 2023-11-08 RX ADMIN — Medication 10 MG: at 20:37

## 2023-11-08 RX ADMIN — POTASSIUM CHLORIDE 40 MEQ: 1500 TABLET, EXTENDED RELEASE ORAL at 17:37

## 2023-11-08 RX ADMIN — DIGOXIN 62.5 MCG: 0.12 TABLET ORAL at 17:37

## 2023-11-08 RX ADMIN — DOCUSATE SODIUM 50 MG AND SENNOSIDES 8.6 MG 2 TABLET: 8.6; 5 TABLET, FILM COATED ORAL at 17:39

## 2023-11-08 RX ADMIN — SIMETHICONE 125 MG: 125 TABLET, CHEWABLE ORAL at 11:16

## 2023-11-08 RX ADMIN — METOPROLOL SUCCINATE 12.5 MG: 25 TABLET, EXTENDED RELEASE ORAL at 05:03

## 2023-11-08 RX ADMIN — TAMSULOSIN HYDROCHLORIDE 0.4 MG: 0.4 CAPSULE ORAL at 09:10

## 2023-11-08 RX ADMIN — OMEPRAZOLE 20 MG: 20 CAPSULE, DELAYED RELEASE ORAL at 05:03

## 2023-11-08 ASSESSMENT — ENCOUNTER SYMPTOMS
SINUS PAIN: 0
ABDOMINAL PAIN: 0
VOMITING: 0
MYALGIAS: 1
SHORTNESS OF BREATH: 0
FEVER: 0
COUGH: 0
NAUSEA: 0
FALLS: 1
DIARRHEA: 0
CHILLS: 0

## 2023-11-08 ASSESSMENT — FIBROSIS 4 INDEX: FIB4 SCORE: 1.22

## 2023-11-08 ASSESSMENT — PAIN DESCRIPTION - PAIN TYPE
TYPE: ACUTE PAIN
TYPE: ACUTE PAIN

## 2023-11-08 NOTE — PROGRESS NOTES
Report received from Rn. Assumed pt care. Pt resting in bed on RA .Fall precautions in place, call light and belongings within reach, bed in lowest position. No signs of distress.

## 2023-11-08 NOTE — DISCHARGE PLANNING
Case Management Discharge Planning    Admission Date: 10/26/2023  GMLOS: 4.2  ALOS: 13    6-Clicks ADL Score: 14  6-Clicks Mobility Score: 11  PT and/or OT Eval ordered: Yes  Post-acute Referrals Ordered: Yes  Post-acute Choice Obtained: Yes  Has referral(s) been sent to post-acute provider:  Yes      Anticipated Discharge Dispo: Discharge Disposition: D/T to SNF with Medicare cert in anticipation of skilled care (03)    DME Needed: No    Action(s) Taken:     @ 0745 LMSW called [facility] to check for bed availability in the event that pt is medically cleared today. Left a voicemail message.    Discussed pt during morning rounds. Per Susan, pt might not be medically cleared. LMSW to f/u.    Addendum @ 7717 LMSW met with pt and pt's sister to complete POLST form. Per pt, he wanted to take some time to think before finalizing POLST. LMSW left POLST at bedside and f/u at a later time.     Addendum @ 8105 LMSW received a voicemail message from [facility] stating that they have bed availability. LMSW called [facility] to update admission on pt's medical clearance status. Per Zari, they will still have beds available tomorrow.     Addendum @ 8578 ISAIAH spoke with pt at bedside. Per pt, there has been no formal choice made. ISAIAH called pt's sister regarding dcp. Per sister, she will meet with this SW and pt at bedside to discuss if pt is agreeable to SNF.    Addendum @ 4534 ISAIAH spoke with pt at bedside with pt's sister, Lazara, present. Per pt, he is agreeable to [facility] tomorrow if medically cleared. SW to have M.D. sign POLST tomorrow.    Escalations Completed: None    Medically Clear: No    Next Steps: Set up transport for tomorrow.    Barriers to Discharge: Medical clearance

## 2023-11-08 NOTE — PROGRESS NOTES
Hospital Medicine Daily Progress Note    Date of Service  11/6/2023    Chief Complaint  Elias Casanova is a 82 y.o. male admitted 10/26/2023 with NSTEMI, rhabdomyolysis     Hospital Course  This is a  82 y.o. male who was transferred from outside facility 10/26/2023 with rhabdomyolysis and elevated troponin.  PMH of A-fib on anticoagulation, Parkinson disease, hypertension, pulmonary hypertension  He was found on the ground, says he had fallen out of bed, he does not believe he lost consciousness.  He was too weak to get back up and thinks he was on the ground for about 12 hours or more.  He is found to have rhabdomyolysis at outside facility with CPK of 1100. He was also found to have elevated troponin and was sent here for high level of care   denies chest pain, troponin trending up, and his BP is low. metoprolol as his SBP went as low as 70.   His CPK also trended up so he has been started on IV fluid, but will need to be carefull as patient has already lower extremity edema.  Cardiology was consulted for NSTEMI and he was been started on heparin drip, an echo ordered   His sister is at bed side and discussed patient regarding goals of care, code status changed to DNR/DNI   Restarted back his parkinson medication   troponin up to around 1600. Cont on heparin drip.  Hypotension, he has h/o of adrenal insufficiency, transitioned to IV hydrocortisone for stress dose steroids  hemoglobin has dropped down to 6.5 this AM, received a unit of RBC, stoping heparin drip and also holding aspirin for now.  Started on lasix as patient was fluid overload.  A rapid was called, CXr concern for pneumonia started on IV abx.  given a unit of blood and this brought his hemoglobin up to 9.4 so unclear if that 6.5 was an error.  ordered for FOBT to r/o any GI bleed, he was constipated for a few days, increasing bowel regimen and he was able to have brown smears, no melena or red blood in stool.   per cardiology no antiplatelet or   OAC until his anemia source clarified. Hgb has been jumping from 6.5 to the 10's since arrival with no clear pattern.  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   On 11/1, patient had pulse overnight up to 160s, not sustained however tachycardic, systolic blood pressure down to the 80s.  Metoprolol was unable to be given since the hypotension.  net -2 L and appears dry, electrolytes replaced, IV fluid bolus given.  Bowel regimen increased.  Hoping to improve blood pressure with the IV fluids with hopes of restarting metoprolol to better control A-fib with RVR.  I had a long discussion with the patient and his sister at bedside, patient is agreeable to SNF when medically cleared.  No obvious GI bleeding, we discussed GI consultation he is not particularly interested in pursuing colonoscopy at this time, particularly given weak evidence suggesting possible ongoing GI bleed.  will follow-up with oncology/endocrinologist as outpatient to discuss his light chain abnormalities.  Remains high risk of clinical deterioration and high risk of mortality, prognosis guarded.    Interval Problem Update  11/2  Overnight heart rate went up to the 160s, sustained, started on p.o. metoprolol 12.5 as well as IV metoprolol push.  Given overnight and this morning heart rate was down improved to 105, this morning blood pressure came down to the 70s and 80s systolic for a bit.  After couple hours heart rate improved from 80s to low 100s systolic blood pressure improved to the upper 80s low 90s maps greater than 65.  We will continue with the metoprolol 12.5 mg daily in hopes that blood pressure will tolerate so that he can control his heart rate.  Volume status is difficult, bilateral lower extremity edema has been pretty chronic but now  worse, hesitant to give IV fluids.  Did not receive any today and his blood pressure improved as did heart rate with just a bit of time.  Once he shows 24 hours stable vitals heart rate and blood pressure can proceed with discharge. Patient and sister updated at bedside.     11/3  Patient with improved HR and BP on low dose metoprolol. No tachycardia or hypotension today. Hgb still jumping around but no bleeding noted. Will proceed with discharge tomorrow morning if no further episodes of RVR or significant hypotension that have been occurring almost nightly for the last few days. Discussed with family at bedside.    11/4  Metoprolol morning dose held due to SBP in low 100's, was tachycardic following that. Parameters again adjusted, metoprolol given and HR improved. Had some episodes of SBP in the 80's but asymptomatic. Late in the afternoon we received acceptance for SNF, Unclear if he can be admitted there tomorrow or if it has to be Monday based on their admission abilities on the weekend. Continue to monitor BP and HR closely.     11/5  I was notified by case management this AM he is appealing his discharge, will proceed with normal process.  Edward is feeling a bit better today. He continues to feel weak but had a shave and a good bath with staff and he is feeling pretty good. He has had some low BP readings but asymptomatic. Replace magnesium. Metoprolol parameters adjusted. Almost 3 L UOP day prior, net negative total almost 9L, will give some back with IVF today, bot the best PO intake, should help BP as well.     11/6  UOP has been rather large on some days, net negative almost 10 L since admission without ongoing IV diuresis. Gave some fluids back today to improve SBP. He has been having lots of SBP's in the 80's to low 90's. When metoprolol is held for hypotension tachycardia show up. Discussed with pharmacy and will trial dig as well in hopes of rate control without massively affecting BP. Ready to DC to  SNF in [place] when SBP and HR stable. Appealed his discharge. Cardiology recommended not continuing his OAC unless finding a source of presumed bleeding however no further e/o GI bleeding observed. He has been constipated and having brown stool. He would like to restart his pradaxa, I discussed with him I was concerned about bleeds, he said he was more concerned about a stroke and would like me to restart it.   11/7: Patient seen and examined, afebrile, BP low today, SBP down to 60-70. Will start midodrine  Anemia: hemoglobin still fluctuates is 8 today , will hold off on pradaxa for now  Prognosis is guarded   Close monitoring on tele for decompensation   11/8: Patient resting in bed, afebrile, BP still on the low side with SBP in the 80-90 will increase midodrine dose.  Patient sister at bedside. Discussed with them regarding his guarded prognosis.  Anemia: hemoglobin of 7.8 hodl on antiplatelets and also pradaxa as patient high risk for bleeding   Close monitoring on tele   I have discussed this patient's plan of care and discharge plan at IDT rounds today with Case Management, Nursing, Nursing leadership, and other members of the IDT team.    Consultants/Specialty  cardiology    Code Status  DNAR/DNI    Disposition  Medically Cleared  I have placed the appropriate orders for post-discharge needs.    Review of Systems  Review of Systems   Constitutional:  Negative for chills and fever.   HENT:  Negative for congestion, nosebleeds and sinus pain.    Respiratory:  Negative for cough and shortness of breath.    Cardiovascular:  Negative for chest pain.   Gastrointestinal:  Negative for abdominal pain, diarrhea, nausea and vomiting.   Genitourinary:  Negative for dysuria and urgency.   Musculoskeletal:  Positive for falls and myalgias.   All other systems reviewed and are negative.       Physical Exam  Temp:  [36.5 °C (97.7 °F)-37.2 °C (99 °F)] 37.1 °C (98.8 °F)  Pulse:  [78-93] 90  Resp:  [16-20] 18  BP:  (84-96)/(50-65) 84/50  SpO2:  [93 %-97 %] 94 %    Physical Exam  Constitutional:       General: He is not in acute distress.     Appearance: Normal appearance. He is not toxic-appearing.   HENT:      Head: Normocephalic and atraumatic.      Mouth/Throat:      Mouth: Mucous membranes are moist.      Pharynx: Oropharynx is clear. No oropharyngeal exudate or posterior oropharyngeal erythema.   Eyes:      General: No scleral icterus.  Cardiovascular:      Rate and Rhythm: Normal rate. Rhythm irregular.      Pulses: Normal pulses.      Heart sounds: Normal heart sounds. No murmur heard.  Pulmonary:      Effort: Pulmonary effort is normal. No respiratory distress.      Breath sounds: Normal breath sounds. No wheezing.   Abdominal:      Palpations: Abdomen is soft.      Tenderness: There is no abdominal tenderness. There is no guarding or rebound.   Musculoskeletal:         General: No swelling or tenderness. Normal range of motion.      Cervical back: Normal range of motion.      Right lower leg: Edema present.      Left lower leg: Edema present.   Skin:     General: Skin is warm and dry.   Neurological:      General: No focal deficit present.      Mental Status: He is alert and oriented to person, place, and time. Mental status is at baseline.      Motor: Weakness (generalized) present.      Coordination: Coordination abnormal.   Psychiatric:         Mood and Affect: Mood normal.         Behavior: Behavior normal.         Thought Content: Thought content normal.         Judgment: Judgment normal.         Fluids    Intake/Output Summary (Last 24 hours) at 11/8/2023 1535  Last data filed at 11/8/2023 0324  Gross per 24 hour   Intake 840 ml   Output 1100 ml   Net -260 ml         Laboratory  Recent Labs     11/06/23  0145 11/08/23  0855   WBC 9.7 9.9   RBC 2.99* 2.94*   HEMOGLOBIN 8.0* 7.8*   HEMATOCRIT 27.1* 27.0*   MCV 90.6 91.8   MCH 26.8* 26.5*   MCHC 29.5* 28.9*   RDW 53.6* 55.3*   PLATELETCT 322 340   MPV 9.8 10.6        Recent Labs     11/06/23  0145 11/08/23  0855   SODIUM 137 137   POTASSIUM 5.0 4.9   CHLORIDE 106 104   CO2 24 24   GLUCOSE 89 77   BUN 21 25*   CREATININE 0.72 0.80   CALCIUM 7.7* 8.0*                         Imaging  DX-ESOPHAGUS - [id number]   Final Result      EC-ECHOCARDIOGRAM COMPLETE W/O CONT   Final Result      DX-CHEST-PORTABLE (1 VIEW)   Final Result      1.  Hypoinflation with elevated LEFT hemidiaphragm and LEFT lung base atelectasis or pneumonia.   2.  Probable mild edema.   3.  No pneumothorax.           Assessment/Plan  * NSTEMI (non-ST elevated myocardial infarction) ([facility])- (present on admission)  Assessment & Plan  Troponin significantly elevated at outside facility and trending up, in the 600s here  EKG from transferring facility not concerning for acute ischemia, repeat EKG here ordered  Patient fell out of bed and was unable to get up, he does have a history of Parkinson's disease.  Unclear if he had a coronary event that resulted in him falling  I have consulted cardiology and discussed case with Dr. Kelly   Will continue on heparin drip for now, echo has been ordered since troponin trending up   No cardiac cath at this time as patient not a good candidate for it.  He declined echo initially now family at bedside and we had discussion, with patient sister who will discuss with him again to see if she can convince him to have the echo   close monitoring on tele   Echo showing ischemia/ MI on the LAD territory   Cardiology following  As per cardiology to hold off on his antiplatelet or OAC forhis A.fibb due to his anemia  I have ordered a FOBT to r/o GI bleed     Advance care planning  Assessment & Plan  His sister is now at bedside and we had a long discussion with patient and his sister regarding goals of care.  Since patient has declined echo today we discussed regarding code status and also comfort care and hospice  Will change patient to DNR/DNI for now , no hospice or comfort  care  Sister will talk and try to convince him to see if he is willing to have the echo   More then 18 min spent on discussion for goals of care alone       Anemia- (present on admission)  Assessment & Plan  Baseline hemoglobin 12-14.  Was 8.9 on presentation.    His OAC and antiplatelet have been held. I have ordered for FOBT to r/o GI bleed if that positive will consult GI   His hemoglobin yesterday was 6.5 and got 1 unit of RBC which brought his hemoglobin up to 9.4 uncleare if the 6.5 was an eror or possible due to his fluid overload   I have ordered for FOBT if that is positive will consult GI     Non-traumatic rhabdomyolysis- (present on admission)  Assessment & Plan  CPK continues to trend up starting IV fluid but need to be careful  as patient is edematous   This was also discussed with family     Persistent atrial fibrillation (HCC)- (present on admission)  Assessment & Plan  Follows with cardiology, holding metoprolol as his BP is low   Holding pradaxa as he is on heparin drip     Parkinson disease- (present on admission)  Assessment & Plan  Cont outpatient regimen   PT, OT ordered         The very real possibilty of a deterioration of this patient's condition required the highest level of my preparedness for sudden, emergent intervention.  I provided services, which included medication orders, frequent reevaluations of the patient's condition and response to treatment, ordering and reviewing test results, and discussing the case with various consultants.  The  time associated with the care of the patient was 51 minutes.     VTE prophylaxis: scd           I have performed a physical exam and reviewed and updated ROS and Plan today (11/6/2023). In review of yesterday's note (11/5/2023), there are no changes except as documented above.

## 2023-11-08 NOTE — CARE PLAN
The patient is Watcher - Medium risk of patient condition declining or worsening    Shift Goals  Clinical Goals: Monitor HR & BP, monitor labs  Patient Goals: to feel better  Family Goals: delaney    Progress made toward(s) clinical / shift goals:    Problem: Knowledge Deficit - Standard  Goal: Patient and family/care givers will demonstrate understanding of plan of care, disease process/condition, diagnostic tests and medications  Outcome: Progressing; pt states understanding of current plan of care.      Problem: Fall Risk  Goal: Patient will remain free from falls  Outcome: Progressing; pt remains free from falls; fall precautions in place.

## 2023-11-08 NOTE — PROGRESS NOTES
Monitor Summary:     Rhythm: A-fib  Rate: 75-88  Ectopy: 0  Measurements:-/0.08/-            12 Hour Chart Check

## 2023-11-08 NOTE — CARE PLAN
The patient is Watcher - Medium risk of patient condition declining or worsening    Shift Goals  Clinical Goals: monitor BP + labs, maintain skin integrity  Patient Goals: to feel better  Family Goals: delaney    Progress made toward(s) clinical / shift goals:        Problem: Knowledge Deficit - Standard  Goal: Patient and family/care givers will demonstrate understanding of plan of care, disease process/condition, diagnostic tests and medications  Outcome: Progressing     Problem: Skin Integrity  Goal: Skin integrity is maintained or improved  Outcome: Progressing     Problem: Fall Risk  Goal: Patient will remain free from falls  Outcome: Progressing     Problem: Hemodynamics  Goal: Patient's hemodynamics, fluid balance and neurologic status will be stable or improve  Outcome: Progressing       Patient is not progressing towards the following goals:

## 2023-11-09 LAB
ANION GAP SERPL CALC-SCNC: 8 MMOL/L (ref 7–16)
BUN SERPL-MCNC: 23 MG/DL (ref 8–22)
CALCIUM SERPL-MCNC: 7.8 MG/DL (ref 8.5–10.5)
CHLORIDE SERPL-SCNC: 106 MMOL/L (ref 96–112)
CO2 SERPL-SCNC: 23 MMOL/L (ref 20–33)
CREAT SERPL-MCNC: 0.72 MG/DL (ref 0.5–1.4)
ERYTHROCYTE [DISTWIDTH] IN BLOOD BY AUTOMATED COUNT: 54 FL (ref 35.9–50)
GFR SERPLBLD CREATININE-BSD FMLA CKD-EPI: 92 ML/MIN/1.73 M 2
GLUCOSE SERPL-MCNC: 73 MG/DL (ref 65–99)
HCT VFR BLD AUTO: 27.3 % (ref 42–52)
HGB BLD-MCNC: 8 G/DL (ref 14–18)
MCH RBC QN AUTO: 26.8 PG (ref 27–33)
MCHC RBC AUTO-ENTMCNC: 29.3 G/DL (ref 32.3–36.5)
MCV RBC AUTO: 91.6 FL (ref 81.4–97.8)
PLATELET # BLD AUTO: 331 K/UL (ref 164–446)
PMV BLD AUTO: 10.2 FL (ref 9–12.9)
POTASSIUM SERPL-SCNC: 4.2 MMOL/L (ref 3.6–5.5)
RBC # BLD AUTO: 2.98 M/UL (ref 4.7–6.1)
SODIUM SERPL-SCNC: 137 MMOL/L (ref 135–145)
WBC # BLD AUTO: 9 K/UL (ref 4.8–10.8)

## 2023-11-09 PROCEDURE — 770020 HCHG ROOM/CARE - TELE (206)

## 2023-11-09 PROCEDURE — A9270 NON-COVERED ITEM OR SERVICE: HCPCS | Performed by: STUDENT IN AN ORGANIZED HEALTH CARE EDUCATION/TRAINING PROGRAM

## 2023-11-09 PROCEDURE — 700105 HCHG RX REV CODE 258: Performed by: INTERNAL MEDICINE

## 2023-11-09 PROCEDURE — 700102 HCHG RX REV CODE 250 W/ 637 OVERRIDE(OP)

## 2023-11-09 PROCEDURE — 700105 HCHG RX REV CODE 258: Performed by: NURSE PRACTITIONER

## 2023-11-09 PROCEDURE — A9270 NON-COVERED ITEM OR SERVICE: HCPCS | Performed by: INTERNAL MEDICINE

## 2023-11-09 PROCEDURE — 700102 HCHG RX REV CODE 250 W/ 637 OVERRIDE(OP): Performed by: INTERNAL MEDICINE

## 2023-11-09 PROCEDURE — 80048 BASIC METABOLIC PNL TOTAL CA: CPT

## 2023-11-09 PROCEDURE — 85027 COMPLETE CBC AUTOMATED: CPT

## 2023-11-09 PROCEDURE — A9270 NON-COVERED ITEM OR SERVICE: HCPCS

## 2023-11-09 PROCEDURE — 700111 HCHG RX REV CODE 636 W/ 250 OVERRIDE (IP): Performed by: STUDENT IN AN ORGANIZED HEALTH CARE EDUCATION/TRAINING PROGRAM

## 2023-11-09 PROCEDURE — 99233 SBSQ HOSP IP/OBS HIGH 50: CPT | Performed by: INTERNAL MEDICINE

## 2023-11-09 PROCEDURE — 36415 COLL VENOUS BLD VENIPUNCTURE: CPT

## 2023-11-09 PROCEDURE — 700102 HCHG RX REV CODE 250 W/ 637 OVERRIDE(OP): Performed by: STUDENT IN AN ORGANIZED HEALTH CARE EDUCATION/TRAINING PROGRAM

## 2023-11-09 RX ORDER — SODIUM CHLORIDE 9 MG/ML
250 INJECTION, SOLUTION INTRAVENOUS ONCE
Status: COMPLETED | OUTPATIENT
Start: 2023-11-09 | End: 2023-11-09

## 2023-11-09 RX ADMIN — SIMETHICONE 125 MG: 125 TABLET, CHEWABLE ORAL at 12:31

## 2023-11-09 RX ADMIN — POTASSIUM CHLORIDE 40 MEQ: 1500 TABLET, EXTENDED RELEASE ORAL at 05:09

## 2023-11-09 RX ADMIN — MIDODRINE HYDROCHLORIDE 10 MG: 5 TABLET ORAL at 09:04

## 2023-11-09 RX ADMIN — SIMETHICONE 125 MG: 125 TABLET, CHEWABLE ORAL at 18:58

## 2023-11-09 RX ADMIN — FINASTERIDE 5 MG: 5 TABLET, FILM COATED ORAL at 05:09

## 2023-11-09 RX ADMIN — ROTIGOTINE 4 MG: 4 PATCH, EXTENDED RELEASE TRANSDERMAL at 18:17

## 2023-11-09 RX ADMIN — SODIUM CHLORIDE 250 ML: 9 INJECTION, SOLUTION INTRAVENOUS at 21:31

## 2023-11-09 RX ADMIN — MIDODRINE HYDROCHLORIDE 10 MG: 5 TABLET ORAL at 18:18

## 2023-11-09 RX ADMIN — OMEPRAZOLE 20 MG: 20 CAPSULE, DELAYED RELEASE ORAL at 05:09

## 2023-11-09 RX ADMIN — LEVOTHYROXINE, LIOTHYRONINE 60 MG: 19; 4.5 TABLET ORAL at 05:09

## 2023-11-09 RX ADMIN — MIDODRINE HYDROCHLORIDE 10 MG: 5 TABLET ORAL at 12:31

## 2023-11-09 RX ADMIN — TAMSULOSIN HYDROCHLORIDE 0.4 MG: 0.4 CAPSULE ORAL at 09:04

## 2023-11-09 RX ADMIN — DOCUSATE SODIUM 50 MG AND SENNOSIDES 8.6 MG 2 TABLET: 8.6; 5 TABLET, FILM COATED ORAL at 05:10

## 2023-11-09 RX ADMIN — OMEPRAZOLE 20 MG: 20 CAPSULE, DELAYED RELEASE ORAL at 18:18

## 2023-11-09 RX ADMIN — POTASSIUM CHLORIDE 40 MEQ: 1500 TABLET, EXTENDED RELEASE ORAL at 12:31

## 2023-11-09 RX ADMIN — SODIUM CHLORIDE 250 ML: 9 INJECTION, SOLUTION INTRAVENOUS at 19:54

## 2023-11-09 RX ADMIN — POTASSIUM CHLORIDE 40 MEQ: 1500 TABLET, EXTENDED RELEASE ORAL at 18:18

## 2023-11-09 RX ADMIN — DOCUSATE SODIUM 100 MG: 100 CAPSULE, LIQUID FILLED ORAL at 05:09

## 2023-11-09 RX ADMIN — Medication 10 MG: at 21:15

## 2023-11-09 RX ADMIN — PREDNISONE 5 MG: 5 TABLET ORAL at 05:09

## 2023-11-09 ASSESSMENT — ENCOUNTER SYMPTOMS
SHORTNESS OF BREATH: 0
FALLS: 1
SINUS PAIN: 0
DIARRHEA: 0
MYALGIAS: 1
NAUSEA: 0
VOMITING: 0
ABDOMINAL PAIN: 0
COUGH: 0
CHILLS: 0
FEVER: 0

## 2023-11-09 ASSESSMENT — FIBROSIS 4 INDEX: FIB4 SCORE: 1.15

## 2023-11-09 NOTE — CARE PLAN
"The patient is Stable - Low risk of patient condition declining or worsening    Shift Goals  Clinical Goals: Monitor BP/HR and labs  Patient Goals: \"to get my blood pressure under control\"  Family Goals: delaney    Progress made toward(s) clinical / shift goals:    Problem: Knowledge Deficit - Standard  Goal: Patient and family/care givers will demonstrate understanding of plan of care, disease process/condition, diagnostic tests and medications  Outcome: Progressing; pt states understanding of current plan of care.     Problem: Fall Risk  Goal: Patient will remain free from falls  Outcome: Progressing; pt remains free from falls; fall precautions in place.          "

## 2023-11-09 NOTE — PROGRESS NOTES
Monitor Summary:     Rhythm: A-fib  Rate:   Ectopy: 0  Measurements: -/0.08/-           12 Hour Chart Check Monitor Summary:

## 2023-11-09 NOTE — PROGRESS NOTES
Hospital Medicine Daily Progress Note    Date of Service  11/6/2023    Chief Complaint  Elias Casanova is a 82 y.o. male admitted 10/26/2023 with NSTEMI, rhabdomyolysis     Hospital Course  This is a  82 y.o. male who was transferred from outside facility 10/26/2023 with rhabdomyolysis and elevated troponin.  PMH of A-fib on anticoagulation, Parkinson disease, hypertension, pulmonary hypertension  He was found on the ground, says he had fallen out of bed, he does not believe he lost consciousness.  He was too weak to get back up and thinks he was on the ground for about 12 hours or more.  He is found to have rhabdomyolysis at outside facility with CPK of 1100. He was also found to have elevated troponin and was sent here for high level of care   denies chest pain, troponin trending up, and his BP is low. metoprolol as his SBP went as low as 70.   His CPK also trended up so he has been started on IV fluid, but will need to be carefull as patient has already lower extremity edema.  Cardiology was consulted for NSTEMI and he was been started on heparin drip, an echo ordered   His sister is at bed side and discussed patient regarding goals of care, code status changed to DNR/DNI   Restarted back his parkinson medication   troponin up to around 1600. Cont on heparin drip.  Hypotension, he has h/o of adrenal insufficiency, transitioned to IV hydrocortisone for stress dose steroids  hemoglobin has dropped down to 6.5 this AM, received a unit of RBC, stoping heparin drip and also holding aspirin for now.  Started on lasix as patient was fluid overload.  A rapid was called, CXr concern for pneumonia started on IV abx.  given a unit of blood and this brought his hemoglobin up to 9.4 so unclear if that 6.5 was an error.  ordered for FOBT to r/o any GI bleed, he was constipated for a few days, increasing bowel regimen and he was able to have brown smears, no melena or red blood in stool.   per cardiology no antiplatelet or   OAC until his anemia source clarified. Hgb has been jumping from 6.5 to the 10's since arrival with no clear pattern.  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   On 11/1, patient had pulse overnight up to 160s, not sustained however tachycardic, systolic blood pressure down to the 80s.  Metoprolol was unable to be given since the hypotension.  net -2 L and appears dry, electrolytes replaced, IV fluid bolus given.  Bowel regimen increased.  Hoping to improve blood pressure with the IV fluids with hopes of restarting metoprolol to better control A-fib with RVR.  I had a long discussion with the patient and his sister at bedside, patient is agreeable to SNF when medically cleared.  No obvious GI bleeding, we discussed GI consultation he is not particularly interested in pursuing colonoscopy at this time, particularly given weak evidence suggesting possible ongoing GI bleed.  will follow-up with oncology/endocrinologist as outpatient to discuss his light chain abnormalities.  Remains high risk of clinical deterioration and high risk of mortality, prognosis guarded.    Interval Problem Update  11/2  Overnight heart rate went up to the 160s, sustained, started on p.o. metoprolol 12.5 as well as IV metoprolol push.  Given overnight and this morning heart rate was down improved to 105, this morning blood pressure came down to the 70s and 80s systolic for a bit.  After couple hours heart rate improved from 80s to low 100s systolic blood pressure improved to the upper 80s low 90s maps greater than 65.  We will continue with the metoprolol 12.5 mg daily in hopes that blood pressure will tolerate so that he can control his heart rate.  Volume status is difficult, bilateral lower extremity edema has been pretty chronic but now  worse, hesitant to give IV fluids.  Did not receive any today and his blood pressure improved as did heart rate with just a bit of time.  Once he shows 24 hours stable vitals heart rate and blood pressure can proceed with discharge. Patient and sister updated at bedside.     11/3  Patient with improved HR and BP on low dose metoprolol. No tachycardia or hypotension today. Hgb still jumping around but no bleeding noted. Will proceed with discharge tomorrow morning if no further episodes of RVR or significant hypotension that have been occurring almost nightly for the last few days. Discussed with family at bedside.    11/4  Metoprolol morning dose held due to SBP in low 100's, was tachycardic following that. Parameters again adjusted, metoprolol given and HR improved. Had some episodes of SBP in the 80's but asymptomatic. Late in the afternoon we received acceptance for SNF, Unclear if he can be admitted there tomorrow or if it has to be Monday based on their admission abilities on the weekend. Continue to monitor BP and HR closely.     11/5  I was notified by case management this AM he is appealing his discharge, will proceed with normal process.  Edward is feeling a bit better today. He continues to feel weak but had a shave and a good bath with staff and he is feeling pretty good. He has had some low BP readings but asymptomatic. Replace magnesium. Metoprolol parameters adjusted. Almost 3 L UOP day prior, net negative total almost 9L, will give some back with IVF today, bot the best PO intake, should help BP as well.     11/6  UOP has been rather large on some days, net negative almost 10 L since admission without ongoing IV diuresis. Gave some fluids back today to improve SBP. He has been having lots of SBP's in the 80's to low 90's. When metoprolol is held for hypotension tachycardia show up. Discussed with pharmacy and will trial dig as well in hopes of rate control without massively affecting BP. Ready to DC to  SNF in [place] when SBP and HR stable. Appealed his discharge. Cardiology recommended not continuing his OAC unless finding a source of presumed bleeding however no further e/o GI bleeding observed. He has been constipated and having brown stool. He would like to restart his pradaxa, I discussed with him I was concerned about bleeds, he said he was more concerned about a stroke and would like me to restart it.   11/7: Patient seen and examined, afebrile, BP low today, SBP down to 60-70. Will start midodrine  Anemia: hemoglobin still fluctuates is 8 today , will hold off on pradaxa for now  Prognosis is guarded   Close monitoring on tele for decompensation   11/8: Patient resting in bed, afebrile, BP still on the low side with SBP in the 80-90 will increase midodrine dose.  Patient sister at bedside. Discussed with them regarding his guarded prognosis.  Anemia: hemoglobin of 7.8 hodl on antiplatelets and also pradaxa as patient high risk for bleeding   Close monitoring on tele   11/9: Patient seen and examined, afebrile, no nausea or vomiting BP still on the low side sbp in the 70-80.  Will stop tamsulosin   Cont on midodrine 10 mg TID   I have discussed this patient's plan of care and discharge plan at IDT rounds today with Case Management, Nursing, Nursing leadership, and other members of the IDT team.    Consultants/Specialty  cardiology    Code Status  DNAR/DNI    Disposition  The patient is not medically cleared for discharge to home or a post-acute facility.      I have placed the appropriate orders for post-discharge needs.    Review of Systems  Review of Systems   Constitutional:  Negative for chills and fever.   HENT:  Negative for congestion, nosebleeds and sinus pain.    Respiratory:  Negative for cough and shortness of breath.    Cardiovascular:  Negative for chest pain.   Gastrointestinal:  Negative for abdominal pain, diarrhea, nausea and vomiting.   Genitourinary:  Negative for dysuria and urgency.    Musculoskeletal:  Positive for falls and myalgias.   All other systems reviewed and are negative.       Physical Exam  Temp:  [36.2 °C (97.2 °F)-36.8 °C (98.2 °F)] 36.6 °C (97.9 °F)  Pulse:  [58-99] 58  Resp:  [17-21] 20  BP: ()/() 73/49  SpO2:  [90 %-95 %] 91 %    Physical Exam  Constitutional:       General: He is not in acute distress.     Appearance: Normal appearance. He is not toxic-appearing.   HENT:      Head: Normocephalic and atraumatic.      Mouth/Throat:      Mouth: Mucous membranes are moist.      Pharynx: Oropharynx is clear. No oropharyngeal exudate or posterior oropharyngeal erythema.   Eyes:      General: No scleral icterus.  Cardiovascular:      Rate and Rhythm: Normal rate. Rhythm irregular.      Pulses: Normal pulses.      Heart sounds: Normal heart sounds. No murmur heard.  Pulmonary:      Effort: Pulmonary effort is normal. No respiratory distress.      Breath sounds: Normal breath sounds. No wheezing.   Abdominal:      Palpations: Abdomen is soft.      Tenderness: There is no abdominal tenderness. There is no guarding or rebound.   Musculoskeletal:         General: No swelling or tenderness. Normal range of motion.      Cervical back: Normal range of motion.      Right lower leg: Edema present.      Left lower leg: Edema present.   Skin:     General: Skin is warm and dry.   Neurological:      General: No focal deficit present.      Mental Status: He is alert and oriented to person, place, and time. Mental status is at baseline.      Motor: Weakness (generalized) present.      Coordination: Coordination abnormal.   Psychiatric:         Mood and Affect: Mood normal.         Behavior: Behavior normal.         Thought Content: Thought content normal.         Judgment: Judgment normal.         Fluids    Intake/Output Summary (Last 24 hours) at 11/9/2023 1435  Last data filed at 11/9/2023 1200  Gross per 24 hour   Intake 360 ml   Output 2750 ml   Net -2390 ml       Laboratory  Recent  Labs     11/08/23  0855 11/09/23 0447   WBC 9.9 9.0   RBC 2.94* 2.98*   HEMOGLOBIN 7.8* 8.0*   HEMATOCRIT 27.0* 27.3*   MCV 91.8 91.6   MCH 26.5* 26.8*   MCHC 28.9* 29.3*   RDW 55.3* 54.0*   PLATELETCT 340 331   MPV 10.6 10.2     Recent Labs     11/08/23  0855 11/09/23 0447   SODIUM 137 137   POTASSIUM 4.9 4.2   CHLORIDE 104 106   CO2 24 23   GLUCOSE 77 73   BUN 25* 23*   CREATININE 0.80 0.72   CALCIUM 8.0* 7.8*                       Imaging  DX-ESOPHAGUS - [id number]   Final Result      EC-ECHOCARDIOGRAM COMPLETE W/O CONT   Final Result      DX-CHEST-PORTABLE (1 VIEW)   Final Result      1.  Hypoinflation with elevated LEFT hemidiaphragm and LEFT lung base atelectasis or pneumonia.   2.  Probable mild edema.   3.  No pneumothorax.           Assessment/Plan  * NSTEMI (non-ST elevated myocardial infarction) (HCC)- (present on admission)  Assessment & Plan  Troponin significantly elevated at outside facility and trending up, in the 600s here  EKG from transferring facility not concerning for acute ischemia, repeat EKG here ordered  Patient fell out of bed and was unable to get up, he does have a history of Parkinson's disease.  Unclear if he had a coronary event that resulted in him falling  I have consulted cardiology and discussed case with Dr. Kelly   Will continue on heparin drip for now, echo has been ordered since troponin trending up   No cardiac cath at this time as patient not a good candidate for it.  He declined echo initially now family at bedside and we had discussion, with patient sister who will discuss with him again to see if she can convince him to have the echo   close monitoring on tele   Echo showing ischemia/ MI on the LAD territory   Cardiology following  As per cardiology to hold off on his antiplatelet or OAC forhis A.fibb due to his anemia  I have ordered a FOBT to r/o GI bleed     Advance care planning  Assessment & Plan  His sister is now at bedside and we had a long discussion with  patient and his sister regarding goals of care.  Since patient has declined echo today we discussed regarding code status and also comfort care and hospice  Will change patient to DNR/DNI for now , no hospice or comfort care  Sister will talk and try to convince him to see if he is willing to have the echo   More then 18 min spent on discussion for goals of care alone       Anemia- (present on admission)  Assessment & Plan  Baseline hemoglobin 12-14.  Was 8.9 on presentation.    His OAC and antiplatelet have been held. I have ordered for FOBT to r/o GI bleed if that positive will consult GI   His hemoglobin yesterday was 6.5 and got 1 unit of RBC which brought his hemoglobin up to 9.4 uncleare if the 6.5 was an eror or possible due to his fluid overload   I have ordered for FOBT if that is positive will consult GI     Non-traumatic rhabdomyolysis- (present on admission)  Assessment & Plan  CPK continues to trend up starting IV fluid but need to be careful  as patient is edematous   This was also discussed with family     Persistent atrial fibrillation (HCC)- (present on admission)  Assessment & Plan  Follows with cardiology, holding metoprolol as his BP is low   Holding pradaxa as he is on heparin drip     Parkinson disease- (present on admission)  Assessment & Plan  Cont outpatient regimen   PT, OT ordered      Greater than 51 minutes spent prepping to see patient (e.g. review of tests) obtaining and/or reviewing separately obtained history. Performing a medically appropriate examination and/ evaluation.  Counseling and educating the patient/family/caregiver.  Ordering medications, tests, or procedures.  Documenting clinical information in EPIC.  Independently interpreting results and communicating results to patient/family/caregiver.  Care coordination.  .     VTE prophylaxis: scd           I have performed a physical exam and reviewed and updated ROS and Plan today (11/6/2023). In review of yesterday's note  (11/5/2023), there are no changes except as documented above.

## 2023-11-10 ENCOUNTER — PATIENT OUTREACH (OUTPATIENT)
Dept: SCHEDULING | Facility: IMAGING CENTER | Age: 83
End: 2023-11-10
Payer: MEDICARE

## 2023-11-10 VITALS
TEMPERATURE: 97.9 F | BODY MASS INDEX: 26.03 KG/M2 | WEIGHT: 166.23 LBS | HEART RATE: 77 BPM | SYSTOLIC BLOOD PRESSURE: 117 MMHG | RESPIRATION RATE: 21 BRPM | DIASTOLIC BLOOD PRESSURE: 69 MMHG | OXYGEN SATURATION: 92 %

## 2023-11-10 LAB
ANION GAP SERPL CALC-SCNC: 9 MMOL/L (ref 7–16)
BUN SERPL-MCNC: 23 MG/DL (ref 8–22)
CALCIUM SERPL-MCNC: 8 MG/DL (ref 8.5–10.5)
CHLORIDE SERPL-SCNC: 105 MMOL/L (ref 96–112)
CO2 SERPL-SCNC: 21 MMOL/L (ref 20–33)
CREAT SERPL-MCNC: 0.73 MG/DL (ref 0.5–1.4)
ERYTHROCYTE [DISTWIDTH] IN BLOOD BY AUTOMATED COUNT: 57.3 FL (ref 35.9–50)
GFR SERPLBLD CREATININE-BSD FMLA CKD-EPI: 91 ML/MIN/1.73 M 2
GLUCOSE SERPL-MCNC: 78 MG/DL (ref 65–99)
HCT VFR BLD AUTO: 31.4 % (ref 42–52)
HGB BLD-MCNC: 8.9 G/DL (ref 14–18)
MCH RBC QN AUTO: 27.2 PG (ref 27–33)
MCHC RBC AUTO-ENTMCNC: 28.3 G/DL (ref 32.3–36.5)
MCV RBC AUTO: 96 FL (ref 81.4–97.8)
PLATELET # BLD AUTO: 317 K/UL (ref 164–446)
PMV BLD AUTO: 10 FL (ref 9–12.9)
POTASSIUM SERPL-SCNC: 4.9 MMOL/L (ref 3.6–5.5)
RBC # BLD AUTO: 3.27 M/UL (ref 4.7–6.1)
SODIUM SERPL-SCNC: 135 MMOL/L (ref 135–145)
WBC # BLD AUTO: 9.6 K/UL (ref 4.8–10.8)

## 2023-11-10 PROCEDURE — 85027 COMPLETE CBC AUTOMATED: CPT

## 2023-11-10 PROCEDURE — A9270 NON-COVERED ITEM OR SERVICE: HCPCS | Performed by: INTERNAL MEDICINE

## 2023-11-10 PROCEDURE — 80048 BASIC METABOLIC PNL TOTAL CA: CPT

## 2023-11-10 PROCEDURE — 700102 HCHG RX REV CODE 250 W/ 637 OVERRIDE(OP): Performed by: INTERNAL MEDICINE

## 2023-11-10 PROCEDURE — A9270 NON-COVERED ITEM OR SERVICE: HCPCS | Performed by: STUDENT IN AN ORGANIZED HEALTH CARE EDUCATION/TRAINING PROGRAM

## 2023-11-10 PROCEDURE — 99239 HOSP IP/OBS DSCHRG MGMT >30: CPT | Performed by: INTERNAL MEDICINE

## 2023-11-10 PROCEDURE — 700102 HCHG RX REV CODE 250 W/ 637 OVERRIDE(OP): Performed by: STUDENT IN AN ORGANIZED HEALTH CARE EDUCATION/TRAINING PROGRAM

## 2023-11-10 PROCEDURE — 700111 HCHG RX REV CODE 636 W/ 250 OVERRIDE (IP): Performed by: STUDENT IN AN ORGANIZED HEALTH CARE EDUCATION/TRAINING PROGRAM

## 2023-11-10 PROCEDURE — 36415 COLL VENOUS BLD VENIPUNCTURE: CPT

## 2023-11-10 RX ORDER — ALBUTEROL SULFATE 90 UG/1
2 AEROSOL, METERED RESPIRATORY (INHALATION) EVERY 4 HOURS PRN
Qty: 8.5 G | Status: ON HOLD
Start: 2023-11-10

## 2023-11-10 RX ORDER — MIDODRINE HYDROCHLORIDE 10 MG/1
10 TABLET ORAL
Qty: 60 TABLET | Refills: 0 | Status: ON HOLD | OUTPATIENT
Start: 2023-11-10

## 2023-11-10 RX ORDER — DIGOXIN 0.06 MG/1
62.5 TABLET ORAL DAILY
Qty: 30 TABLET | Refills: 0 | Status: SHIPPED | OUTPATIENT
Start: 2023-11-10 | End: 2023-12-01

## 2023-11-10 RX ORDER — OMEPRAZOLE 20 MG/1
20 CAPSULE, DELAYED RELEASE ORAL 2 TIMES DAILY
Qty: 30 CAPSULE | Refills: 0 | Status: ON HOLD | OUTPATIENT
Start: 2023-11-10

## 2023-11-10 RX ORDER — FINASTERIDE 5 MG/1
5 TABLET, FILM COATED ORAL DAILY
Qty: 30 TABLET | Refills: 0 | Status: ON HOLD | OUTPATIENT
Start: 2023-11-11

## 2023-11-10 RX ADMIN — LEVOTHYROXINE, LIOTHYRONINE 60 MG: 19; 4.5 TABLET ORAL at 04:31

## 2023-11-10 RX ADMIN — MIDODRINE HYDROCHLORIDE 10 MG: 5 TABLET ORAL at 13:15

## 2023-11-10 RX ADMIN — FINASTERIDE 5 MG: 5 TABLET, FILM COATED ORAL at 04:23

## 2023-11-10 RX ADMIN — DOCUSATE SODIUM 50 MG AND SENNOSIDES 8.6 MG 2 TABLET: 8.6; 5 TABLET, FILM COATED ORAL at 04:23

## 2023-11-10 RX ADMIN — MIDODRINE HYDROCHLORIDE 10 MG: 5 TABLET ORAL at 09:16

## 2023-11-10 RX ADMIN — PREDNISONE 5 MG: 5 TABLET ORAL at 04:23

## 2023-11-10 RX ADMIN — POTASSIUM CHLORIDE 40 MEQ: 1500 TABLET, EXTENDED RELEASE ORAL at 13:15

## 2023-11-10 RX ADMIN — POTASSIUM CHLORIDE 40 MEQ: 1500 TABLET, EXTENDED RELEASE ORAL at 04:23

## 2023-11-10 RX ADMIN — DOCUSATE SODIUM 100 MG: 100 CAPSULE, LIQUID FILLED ORAL at 04:23

## 2023-11-10 RX ADMIN — OMEPRAZOLE 20 MG: 20 CAPSULE, DELAYED RELEASE ORAL at 04:23

## 2023-11-10 ASSESSMENT — FIBROSIS 4 INDEX: FIB4 SCORE: 1.19

## 2023-11-10 NOTE — DISCHARGE INSTRUCTIONS
Diagnosis:  Acute Coronary Syndrome (ACS) is a diagnosis that encompasses cardiac-related chest pain and heart attack. ACS occurs when the blood flow to the heart muscle is severely reduced or cut off completely due to a slow process called atherosclerosis.  Atherosclerosis is a disease in which the coronary arteries become narrow from a buildup of fat, cholesterol, and other substances that combine to form plaque. If the plaque breaks, a blood clot will form and block the blood flow to the heart muscle. This lack of blood flow can cause damage or death to the heart muscle which is called a heart attack or Myocardial Infarction (MI). There are two different types of MIs:  ST Elevation Myocardial Infarction or STEMI (the most severe type of heart attack) and Non-ST Elevation Myocardial Infarction or NSTEMI.    Treatment Plan:  Cardiac Diet  - Low fat, low salt, low cholesterol   Cardiac Rehab  - Your doctor has ordered you a referral to [facility].  Call [phone] to schedule an appointment.  Attend my follow-up appointment with my Cardiologist.  Take my medications as prescribed by my doctor  Exercise daily  Lower my bad cholesterol and raise my good cholesterol and Reduce stress    Medications:  Certain medications are used to treat ACS.  Remember to always take medications as prescribed and never stop talking medications unless told by your doctor.    You have been prescribed the following medicatons:    Beta-Blocker - Beta-Blocker metoprolol is used to lower blood pressure and heart rate, and/or helps your heart heal after a heart attack.

## 2023-11-10 NOTE — DISCHARGE PLANNING
Discussed pt during morning rounds. Pending medical clearance.    Discussed pt during IDT rounds. Pt is stable and deemed medically cleared as of today. ISAIAH to call [facility] for transport time.    ISAIAH contacted Marla with [facility]. Per Marla, ISAIAH to set up for 1500 transport. ISAIAH notified pt's sister of intended transport time. ISAIAH notified M.D. Pending transport confirmation.    Addendum @ 9465 ISAIAH reminded Susan MEMBRENO of pending d/c summary.    Addendum @ 2342 ISAIAH completed transport packet and provided to CHITO Henson.

## 2023-11-10 NOTE — PROGRESS NOTES
Report received at 1915. Patient alert and oriented x 4, sister at bedside. On RA and respiration even and unlabored. Denied pain. Tele on and heart rhythm and rate checked on monitor. Waffle overlay inflated.    Bp 77/51 map @ 60. Ordered bolus of NS hung.     Plan of care - monitor BP, will inform provider of bp results if they are not at patient's baseline of if patient gets asymptomatic. Check lab values, q 2 turn. They verbalized understanding.     Safety precautions in place are nonskid socks on, side rails up x 2, bed to lowest level and bed alarm on. Instructed to always call light for needs and assistance. Will round hourly and as needed.

## 2023-11-10 NOTE — CARE PLAN
The patient is Stable - Low risk of patient condition declining or worsening    Shift Goals  Clinical Goals: stable bp, turn q 2  Patient Goals: AYDEN  Family Goals: ayden    Progress made toward(s) clinical / shift goals:      Problem: Pain - Standard  Goal: Alleviation of pain or a reduction in pain to the patient’s comfort goal  Description: Target End Date:  Prior to discharge or change in level of care    Document on Vitals flowsheet    1.  Document pain using the appropriate pain scale per order or unit policy  2.  Educate and implement non-pharmacologic comfort measures (i.e. relaxation, distraction, massage, cold/heat therapy, etc.)  3.  Pain management medications as ordered  4.  Reassess pain after pain med administration per policy  5.  If opiods administered assess patient's response to pain medication is appropriate per POSS sedation scale  6.  Follow pain management plan developed in collaboration with patient and interdisciplinary team (including palliative care or pain specialists if applicable)  Outcome: Progressing       Patient is not progressing towards the following goals:

## 2023-11-10 NOTE — DISCHARGE PLANNING
DC Transport Scheduled    Received request at: 11/10/2023 at 1123    Transport Company Scheduled:  [employer]  Spoke with Rocio at [facility] to schedule transport.    Scheduled Date: 11/10/2023  Scheduled Time: 1500    Destination: [facility] at [address] [place] [address] [place]     Notified care team of scheduled transport via [employer].     If there are any changes needed to the DC transportation scheduled, please contact [facility] at ext. [phone] between the hours of [id number] Mon-Fri. If outside those hours, contact the ED Case Manager at ext. [phone].

## 2023-11-10 NOTE — DISCHARGE SUMMARY
Discharge Summary    CHIEF COMPLAINT ON ADMISSION  No chief complaint on file.      Reason for Admission  NSTEMI    Admission Date  10/26/2023     CODE STATUS  DNAR/DNI    HPI & HOSPITAL COURSE  This is a 82 y.o. male eladio who was transferred from outside facility 10/26/2023 with rhabdomyolysis and elevated troponin.  PMH of A-fib on anticoagulation, Parkinson disease, hypertension, pulmonary hypertension  He was found on the ground, says he had fallen out of bed, he does not believe he lost consciousness.  He was too weak to get back up and thinks he was on the ground for about 12 hours or more.  He is found to have rhabdomyolysis at outside facility with CPK of 1100. He was also found to have elevated troponin and was sent here for high level of care   denies chest pain, troponin trending up, and his BP is low. metoprolol as his SBP went as low as 70.   His CPK also trended up so he has been started on IV fluid, but will need to be carefull as patient has already lower extremity edema.  Cardiology was consulted for NSTEMI and he was been started on heparin drip, an echo ordered   His sister is at bed side and discussed patient regarding goals of care, code status changed to DNR/DNI   Restarted back his parkinson medication   troponin up to around 1600. Cont on heparin drip.  Hypotension, he has h/o of adrenal insufficiency, transitioned to IV hydrocortisone for stress dose steroids  hemoglobin has dropped down to 6.5 this AM, received a unit of RBC, stoping heparin drip and also holding aspirin for now.  Started on lasix as patient was fluid overload.  A rapid was called, CXr concern for pneumonia started on IV abx.  given a unit of blood and this brought his hemoglobin up to 9.4 so unclear if that 6.5 was an error.  ordered for FOBT to r/o any GI bleed, he was constipated for a few days, increasing bowel regimen and he was able to have brown smears, no melena or red blood in stool.   per cardiology no antiplatelet  or  OAC .   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   On 11/1, patient had pulse overnight up to 160s, not sustained however tachycardic, systolic blood pressure down to the 80s.  Metoprolol was unable to be given since the hypotension.  net -2 L and appears dry, electrolytes replaced, IV fluid bolus given. He was started on Digoxin  he was also started on midodrine to help his BPNo obvious GI bleeding, we discussed GI consultation he is not particularly interested in pursuing colonoscopy at this time. Will stop the pradaxa as  his hemoglobin fluctuates  Patient blood pressure is better, we have stopped his metoprolol and started on digoxin for A.fibb   Patient and his sister will consider hospice if condition deteriorate in the future    Patient has been seen by physical therapy and placement has been recommended   Will  discharge patient to SNF today     The patient met 2-midnight criteria for an inpatient stay at the time of discharge.      FOLLOW UP ITEMS POST DISCHARGE  Cardiology   PCP       DISCHARGE DIAGNOSES  Principal Problem:    NSTEMI (non-ST elevated myocardial infarction) (HCC) (POA: Yes)  Active Problems:    Parkinson disease (Chronic) (POA: Yes)    Persistent atrial fibrillation (HCC) (Chronic) (POA: Yes)    Non-traumatic rhabdomyolysis (POA: Yes)    Anemia (POA: Yes)    Advance care planning (POA: Unknown)  Resolved Problems:    * No resolved hospital problems. *      FOLLOW UP  Future Appointments   Date Time Provider Department Center   12/1/2023 12:45 PM JOVANNI Vigil [facility] None     Luz Quiroga D.O.  [address] [facility] [id number] [phone]    Call  Please call your primary care provider to schedule a hospital follow up.  Thank you    [facility]  [address] [place] [id number]  [phone]          MEDICATIONS ON DISCHARGE     Medication List        START taking these medications        Instructions   albuterol 108 (90 Base) MCG/ACT Aers inhalation aerosol   Inhale 2 Puffs every four hours as needed for Shortness of Breath.  Dose: 2 Puff     Digoxin 62.5 MCG Tabs   Take 62.5 mcg by mouth every day at 6 PM.  Dose: 62.5 mcg     finasteride 5 MG Tabs  Start taking on: November 11, 2023  Commonly known as: Proscar   Take 1 Tablet by mouth every day.  Dose: 5 mg     midodrine 10 MG tablet  Commonly known as: Proamatine   Take 1 Tablet by mouth 3 times a day with meals.  Dose: 10 mg     omeprazole 20 MG delayed-release capsule  Commonly known as: PriLOSEC   Take 1 Capsule by mouth 2 times a day.  Dose: 20 mg            CHANGE how you take these medications        Instructions   NP Thyroid 60 MG Tabs  What changed:   how much to take  how to take this  when to take this  Generic drug: thyroid   take one tablet by mouth every morning monday through saturday, take two tablets by mouth on sundays only     predniSONE 5 MG Tabs  What changed:   how much to take  how to take this  when to take this  Commonly known as: Deltasone   Take 1 tab once daily po            CONTINUE taking these medications        Instructions   CoQ-10 100 MG Cpcr   Take 100 mg by mouth every day.  Dose: 100 mg     Cysteine 500 MG Caps   Take 500 mg by mouth every day.  Dose: 500 mg     DHEA 25 MG Tabs   Take 1 Tablet by mouth every day.  Dose: 25 mg     DOPamine HCl Powd   Take 1 Dose by mouth every day.  Dose: 1 Dose     Melatonin 10 MG Tabs   Take 10 mg by mouth every day.  Dose: 10 mg     metoprolol SR 25 MG Tb24  Commonly known as: Toprol XL   Take 0.5 Tablets by mouth every day.  Dose: 12.5 mg     Neupro 4 MG/24HR Pt24  Generic drug: rotigotine   Place 1 Patch on the skin every day.  Dose: 1 Patch     potassium chloride SA 20  MEQ Tbcr  Commonly known as: Kdur   Take 20 mEq by mouth every day.  Dose: 20 mEq     Tyrosine Powd   Take 2.5 g by mouth every day.  Dose: 2.5 g     vitamin D3 1000 Unit (25 mcg) Tabs  Commonly known as: Cholecalciferol   Take 1,000 Units by mouth every day.  Dose: 1,000 Units            STOP taking these medications      Pradaxa 150 MG Caps capsule  Generic drug: dabigatran              Allergies  Allergies   Allergen Reactions    Novocain Unspecified     Eye swelling and redness       DIET  Orders Placed This Encounter   Procedures    Diet Order Diet: Cardiac (meds crushed in LQ or PU (per speech))     Standing Status:   Standing     Number of Occurrences:   1     Order Specific Question:   Diet:     Answer:   Cardiac [6]     Comments:   meds crushed in LQ or PU (per speech)       ACTIVITY  As tolerated.  Weight bearing as tolerated    LINES, DRAINS, AND WOUNDS  This is an automated list. Peripheral IVs will be removed prior to discharge.     External Urinary Catheter (Condom) (Active)   Collection Container Standard drainage bag 11/08/23 1605   Output (mL) 1000 mL 11/08/23 1605      Wound 11/04/23 Partial Thickness Wound Sacrum (Active)   Wound Image   11/05/23 0900   Site Assessment Red 11/09/23 2000   Periwound Assessment Intact;Pink 11/09/23 2000   Margins Attached edges 11/09/23 2000   Closure Secondary intention 11/05/23 1100   Drainage Amount Small 11/05/23 1100   Drainage Description Sanguineous 11/05/23 1100   Treatments Cleansed;Site care;Offloading 11/05/23 1100   Wound Cleansing Foam Cleanser/Washcloth 11/05/23 1100   Periwound Protectant Barrier Paste 11/05/23 1100   Dressing Status Open to Air 11/09/23 2000   Dressing Cleansing/Solutions Not Applicable 11/05/23 1100   Dressing Change/Treatment Frequency Every Shift, and As Needed 11/09/23 0800   NEXT Dressing Change/Treatment Date 11/05/23 11/05/23 1100   NEXT Weekly Photo (Inpatient Only) 11/12/23 11/05/23 1100   Wound Team Following Not following  11/05/23 1100   Non-staged Wound Description Partial thickness 11/05/23 1100                  MENTAL STATUS ON TRANSFER             CONSULTATIONS  Cardiology     PROCEDURES  None     LABORATORY  Lab Results   Component Value Date    SODIUM 135 11/10/2023    POTASSIUM 4.9 11/10/2023    CHLORIDE 105 11/10/2023    CO2 21 11/10/2023    GLUCOSE 78 11/10/2023    BUN 23 (H) 11/10/2023    CREATININE 0.73 11/10/2023        Lab Results   Component Value Date    WBC 9.6 11/10/2023    HEMOGLOBIN 8.9 (L) 11/10/2023    HEMATOCRIT 31.4 (L) 11/10/2023    PLATELETCT 317 11/10/2023        Total time of the discharge process exceeds 35 minutes.

## 2023-11-10 NOTE — PROGRESS NOTES
NOTIFIED HOSPITALIST ON CALL, CYNTHIA PITTMAN OF BP AFTER 250ML BOLUS OF NS, NEW ORDER RECEIVED AND WILL CARRY OUT. WILL CONTINUE TO MONITOR PATIENT.

## 2023-12-01 ENCOUNTER — TELEMEDICINE (OUTPATIENT)
Dept: CARDIOLOGY | Facility: MEDICAL CENTER | Age: 83
End: 2023-12-01
Attending: STUDENT IN AN ORGANIZED HEALTH CARE EDUCATION/TRAINING PROGRAM
Payer: MEDICARE

## 2023-12-01 VITALS
DIASTOLIC BLOOD PRESSURE: 60 MMHG | WEIGHT: 166 LBS | BODY MASS INDEX: 26.06 KG/M2 | RESPIRATION RATE: 16 BRPM | OXYGEN SATURATION: 97 % | HEIGHT: 67 IN | SYSTOLIC BLOOD PRESSURE: 108 MMHG | HEART RATE: 89 BPM

## 2023-12-01 DIAGNOSIS — I50.20 HFREF (HEART FAILURE WITH REDUCED EJECTION FRACTION) (HCC): ICD-10-CM

## 2023-12-01 DIAGNOSIS — R76.8 ELEVATED SERUM IMMUNOGLOBULIN FREE LIGHT CHAIN LEVEL: ICD-10-CM

## 2023-12-01 DIAGNOSIS — I25.2 HISTORY OF NON-ST ELEVATION MYOCARDIAL INFARCTION (NSTEMI): ICD-10-CM

## 2023-12-01 DIAGNOSIS — I50.9 CHF (NYHA CLASS II, ACC/AHA STAGE C) (HCC): ICD-10-CM

## 2023-12-01 DIAGNOSIS — I48.20 CHRONIC A-FIB (HCC): ICD-10-CM

## 2023-12-01 PROCEDURE — 99215 OFFICE O/P EST HI 40 MIN: CPT | Mod: 95 | Performed by: STUDENT IN AN ORGANIZED HEALTH CARE EDUCATION/TRAINING PROGRAM

## 2023-12-01 PROCEDURE — 99213 OFFICE O/P EST LOW 20 MIN: CPT | Performed by: STUDENT IN AN ORGANIZED HEALTH CARE EDUCATION/TRAINING PROGRAM

## 2023-12-01 RX ORDER — AMOXICILLIN AND CLAVULANATE POTASSIUM 875; 125 MG/1; MG/1
1 TABLET, FILM COATED ORAL 2 TIMES DAILY
COMMUNITY
Start: 2023-11-28 | End: 2024-03-26

## 2023-12-01 RX ORDER — ACETAMINOPHEN 325 MG/1
TABLET ORAL
COMMUNITY
End: 2024-03-26

## 2023-12-01 RX ORDER — BISACODYL 10 MG
10 SUPPOSITORY, RECTAL RECTAL DAILY
Status: ON HOLD | COMMUNITY

## 2023-12-01 RX ORDER — ASPIRIN 81 MG/1
81 TABLET, CHEWABLE ORAL DAILY
Status: ON HOLD | COMMUNITY
Start: 2023-11-28

## 2023-12-01 RX ORDER — PNV NO.95/FERROUS FUM/FOLIC AC 28MG-0.8MG
325 TABLET ORAL
Status: ON HOLD | COMMUNITY
Start: 2023-11-28

## 2023-12-01 RX ORDER — ENEMA 19; 7 G/133ML; G/133ML
1 ENEMA RECTAL ONCE
Status: ON HOLD | COMMUNITY

## 2023-12-01 RX ORDER — DOXYCYCLINE HYCLATE 100 MG
100 TABLET ORAL
COMMUNITY
Start: 2023-11-28 | End: 2023-12-03

## 2023-12-01 RX ORDER — METOPROLOL SUCCINATE 25 MG/1
12.5 TABLET, EXTENDED RELEASE ORAL DAILY
Qty: 45 TABLET | Refills: 3 | Status: ON HOLD | OUTPATIENT
Start: 2023-12-01

## 2023-12-01 RX ORDER — DIGOXIN 125 MCG
TABLET ORAL DAILY
Status: ON HOLD | COMMUNITY

## 2023-12-01 RX ORDER — SPIRONOLACTONE 25 MG/1
12.5 TABLET ORAL DAILY
Qty: 45 TABLET | Refills: 3 | Status: ON HOLD | OUTPATIENT
Start: 2023-12-01

## 2023-12-01 RX ORDER — MIDODRINE HYDROCHLORIDE 5 MG/1
5 TABLET ORAL
COMMUNITY
Start: 2023-11-28 | End: 2023-12-28

## 2023-12-01 ASSESSMENT — ENCOUNTER SYMPTOMS
VOMITING: 0
SHORTNESS OF BREATH: 0
WHEEZING: 0
PALPITATIONS: 0
WEAKNESS: 0
NAUSEA: 0
FEVER: 0
SYNCOPE: 0
PND: 0
DYSPNEA ON EXERTION: 0
ORTHOPNEA: 0
DIARRHEA: 0
NIGHT SWEATS: 0
DIZZINESS: 0
FOCAL WEAKNESS: 0
NEAR-SYNCOPE: 0
ABDOMINAL PAIN: 0
COUGH: 0
IRREGULAR HEARTBEAT: 0

## 2023-12-01 ASSESSMENT — FIBROSIS 4 INDEX: FIB4 SCORE: 1.09

## 2023-12-01 NOTE — PROGRESS NOTES
Cardiology Follow-up Consultation Note/Virtual Video Visit Note    This evaluation was conducted via Zoom using secure and encrypted videoconferencing technology. The patient was in a private location outside of their home in the [facility].    The patient's identity was confirmed and verbal consent was obtained for this virtual visit.    Date of note:    12/1/2023    Primary Care Provider: Luz Quiroga D.O.    Patient Name: Elias Casanova   YOB: 1940  MRN:              [id number]    Chief Complaint: Follow-up HFrEF    History of Present Illness: Mr. Elias Casanova is an 83 y.o. male whose current medical problems include persistent A-fib, pulmonary hypertension, adrenal insufficiency on steroids (followed by endocrinology) and Parkinson's disease who is here for follow-up HFrEF.      The patient was previously a patient of Dr. Latif, last seen 8/8/2022.  The patient was doing well during the last visit, and no changes were made to his medication.  The patient was referred to EP for possible consideration of rhythm control.  The patient saw Dr. De La Cruz 4/5/2023.  No changes were made to his medications, and was continued on rate control.  The patient was hospitalized in October 2023, when he was found to mildly depressed LVEF with wall motion abnormalities.  The patient was evaluated by cardiology, and a decision was made to pursue medical management only.  The patient was hospitalized November 2023 for pneumonia in [place].    The patient presents today for follow-up.  The patient reports feeling okay today.  He remains on supplemental oxygen after discharge.  He is currently living at SNF.  He reports occasional leg swelling.  He reports shortness of breath.  Denies any chest pain.  No orthopnea or PND.  No palpitations.  No syncope or presyncopal episodes.    Cardiovascular Risk Factors:  1. Smoking status: Former smoker  2. Type II Diabetes Mellitus: None  Lab Results    Component Value Date/Time    HBA1C 5.5 11/24/2023 04:01 AM    HBA1C 5.5 10/27/2023 12:04 AM    HBA1C 5.6 03/10/2017 11:12 AM     3. Hypertension: None  4. Dyslipidemia: None  Cholesterol,Tot   Date Value Ref Range Status   10/27/2023 95 (L) 100 - 199 mg/dL Final     LDL   Date Value Ref Range Status   10/27/2023 59 <100 mg/dL Final     HDL   Date Value Ref Range Status   10/27/2023 32 (A) >=40 mg/dL Final     Triglycerides   Date Value Ref Range Status   10/27/2023 20 0 - 149 mg/dL Final     5. Family history of early Coronary Artery Disease in a first degree relative (Male less than 55 years of age; Female less than 65 years of age): None  6.  Obesity and/or Metabolic Syndrome: Body mass index is 26 kg/m².  7. Sedentary lifestyle: Sedentary    Review of Systems   Constitutional: Negative for fever, malaise/fatigue and night sweats.   Cardiovascular:  Negative for chest pain, dyspnea on exertion, irregular heartbeat, leg swelling, near-syncope, orthopnea, palpitations, paroxysmal nocturnal dyspnea and syncope.   Respiratory:  Negative for cough, shortness of breath and wheezing.    Gastrointestinal:  Negative for abdominal pain, diarrhea, nausea and vomiting.   Neurological:  Negative for dizziness, focal weakness and weakness.         All other systems reviewed and are negative.         Current Outpatient Medications   Medication Sig Dispense Refill    acetaminophen (TYLENOL) 325 MG Tab Take  by mouth.      amoxicillin-clavulanate (AUGMENTIN) 875-125 MG Tab Take 1 Tablet by mouth 2 times a day.      aspirin (ASA) 81 MG Chew Tab chewable tablet Chew 81 mg every day.      digoxin (LANOXIN) 125 MCG Tab Take  by mouth every day.      doxycycline (VIBRAMYCIN) 100 MG Tab Take 100 mg by mouth.      bisacodyl (DULCOLAX) 10 MG Suppos Insert 10 mg into the rectum every day.      sodium phosphate (FLEET) 7-19 GM/118ML Enema Insert 1 Each into the rectum one time.      Ferrous Sulfate (IRON) 325 (65 Fe) MG Tab Take 325 mg by  "mouth.      LACTOBACILLUS RHAMNOSUS, GG, PO Take  by mouth.      midodrine (PROAMATINE) 5 MG Tab Take 5 mg by mouth.      magnesium hydroxide (MILK OF MAGNESIA) 400 MG/5ML Suspension Take 30 mL by mouth 1 time a day as needed.      metoprolol SR (TOPROL XL) 25 MG TABLET SR 24 HR Take 0.5 Tablets by mouth every day. 45 Tablet 3    spironolactone (ALDACTONE) 25 MG Tab Take 0.5 Tablets by mouth every day. 45 Tablet 3    albuterol 108 (90 Base) MCG/ACT Aero Soln inhalation aerosol Inhale 2 Puffs every four hours as needed for Shortness of Breath. 8.5 g     finasteride (PROSCAR) 5 MG Tab Take 1 Tablet by mouth every day. 30 Tablet 0    midodrine (PROAMATINE) 10 MG tablet Take 1 Tablet by mouth 3 times a day with meals. 60 Tablet 0    omeprazole (PRILOSEC) 20 MG delayed-release capsule Take 1 Capsule by mouth 2 times a day. 30 Capsule 0    vitamin D3 (CHOLECALCIFEROL) 1000 Unit (25 mcg) Tab Take 1,000 Units by mouth every day.      Melatonin 10 MG Tab Take 10 mg by mouth every day.      NP THYROID 60 MG Tab take one tablet by mouth every morning monday through saturday, take two tablets by mouth on sundays only (Patient taking differently: Take  mg by mouth every day. take one tablet by mouth every morning monday through saturday, take two tablets by mouth on sundays only) 120 Tablet 0    predniSONE (DELTASONE) 5 MG Tab Take 1 tab once daily po (Patient taking differently: Take 5 mg by mouth every day. Take 1 tab once daily po) 90 Tablet 1    NEUPRO 4 MG/24HR PATCH 24 HR Place 1 Patch on the skin every day.       No current facility-administered medications for this visit.         Allergies   Allergen Reactions    Novocain Unspecified     Eye swelling and redness       Physical Exam:  Ambulatory Vitals  /60 (BP Location: Left arm, Patient Position: Sitting, BP Cuff Size: Adult)   Pulse 89   Resp 16   Ht 1.702 m (5' 7\")   Wt 75.3 kg (166 lb)   SpO2 97%    Oxygen Therapy:  Pulse Oximetry: 97 %  BP Readings " from Last 4 Encounters:   12/01/23 108/60   11/10/23 117/69   10/26/23 114/77   10/03/23 110/72       Weight/BMI: Body mass index is 26 kg/m².  Wt Readings from Last 4 Encounters:   12/01/23 75.3 kg (166 lb)   11/10/23 75.4 kg (166 lb 3.6 oz)   10/26/23 62 kg (136 lb 11 oz)   10/03/23 63.7 kg (140 lb 6.9 oz)         General: Well appearing and in no apparent distress  HEENT: Normal  Neurological: No focal sensory deficits  Psychiatric: Appropriate affect, A/O x 3, intact judgement and insight        Lab Data Review:  Lab Results   Component Value Date/Time    CHOLSTRLTOT 95 (L) 10/27/2023 12:04 AM    LDL 59 10/27/2023 12:04 AM    HDL 32 (A) 10/27/2023 12:04 AM    TRIGLYCERIDE 20 10/27/2023 12:04 AM       Lab Results   Component Value Date/Time    SODIUM 135 11/10/2023 04:17 AM    POTASSIUM 4.9 11/10/2023 04:17 AM    CHLORIDE 105 11/10/2023 04:17 AM    CO2 21 11/10/2023 04:17 AM    GLUCOSE 78 11/10/2023 04:17 AM    BUN 23 (H) 11/10/2023 04:17 AM    CREATININE 0.73 11/10/2023 04:17 AM     Lab Results   Component Value Date/Time    ALKPHOSPHAT 89 11/02/2023 03:08 AM    ASTSGOT 101 (H) 11/02/2023 03:08 AM    ALTSGPT 424 (H) 11/02/2023 03:08 AM    TBILIRUBIN 0.2 11/02/2023 03:08 AM      Lab Results   Component Value Date/Time    WBC 9.6 11/10/2023 04:17 AM    HEMOGLOBIN 8.8 (L) 11/28/2023 04:27 AM    HEMOGLOBIN 8.9 (L) 11/10/2023 04:17 AM     Lab Results   Component Value Date/Time    HBA1C 5.5 11/24/2023 04:01 AM    HBA1C 5.5 10/27/2023 12:04 AM    HBA1C 5.6 03/10/2017 11:12 AM         Cardiac Imaging and Procedures Review:    EKG dated 11/1/2023: My personal interpretation is atrial fibrillation    Echo dated 11/20/2023  Interpretation Summary   Left ventricular systolic function is borderline reduced.   The Ejection Fraction estimate is 45-50%   The right ventricular systolic function is borderline reduced.   Injection of contrast documented no interatrial shunt.   There is mild mitral regurgitation   There is no  pericardial effusion.   Large left pleural effusion.     Echo dated 10/29/2023:   CONCLUSIONS  Low normal ventricular systolic function.  The ejection fraction is measured to be 52% by Carrion's biplane.  There is akinesis of the mid to distal septum and apex consistent with   LAD territory ischemia/infarct.  Diastolic function is difficult to assess with arrhythmia.  Normal right ventricular size with borderline reduced systolic   function.  Unable to estimate pulmonary artery pressure due to an inadequate   visualization of the inferior vena cava.  Severe biatrial dilation.  There appears to be a prominent Chiari network, however, would consider   a transesophageal echocardiogram if there was a strong concern for   endocarditis.  Eccentric, probably mild tricuspid regurgitation.  The inferior vena cava is not well visualized.      Assessment & Plan     1. HFrEF (heart failure with reduced ejection fraction) ([facility])  metoprolol SR (TOPROL XL) 25 MG TABLET SR 24 HR    spironolactone (ALDACTONE) 25 MG Tab    Basic Metabolic Panel      2. Elevated serum immunoglobulin free light chain level  Referral to Hematology Oncology      3. CHF (NYHA class II, ACC/AHA stage C) ([facility])  metoprolol SR (TOPROL XL) 25 MG TABLET SR 24 HR    spironolactone (ALDACTONE) 25 MG Tab    Basic Metabolic Panel      4. History of non-ST elevation myocardial infarction (NSTEMI)  metoprolol SR (TOPROL XL) 25 MG TABLET SR 24 HR      5. Chronic a-fib ([facility])  metoprolol SR (TOPROL XL) 25 MG TABLET SR 24 HR            Shared Medical Decision Making:    HFrEF  NYHA class II stage C heart failure  Euvolemic but does report occasional leg swelling and remains on O2 after hospital discharge for PNA.  Likely ischemic etiology.  Patient would like to continue on with medical management only.  -Restart metoprolol at a lower dose of 12.5 mg daily  -Start Aldactone 12.5 mg daily  -BMP in 1 week  -Follow-up in 4 weeks  -BP low for adding any additional  GDMT  -LVEF above 35%, no indication for ICD    Elevated light chains  -There is mild LVH with HFrEF, possible amyloidosis.  -Will refer to hematology oncology for further workup.    History of NSTEMI  Hospitalized 10/2023.  Medically managed.  Echocardiogram suggestive of infarct/ischemia but a decision was made to pursue medical management only.  -Not on dual antiplatelet due to concern for bleed/low hemoglobin  -Continue aspirin 81 mg daily previous cholesterol levels normal, will hold off statin, can consider next visit    Chronic A-fib  -Anticoagulation held due to concern for bleed/low hemoglobin.    -Continue digoxin  -Restart metoprolol at a lower dose as above    A total of 41 minutes of time was spent on day of encounter reviewing medical record, performing history and examination, counseling, ordering medication/test/consults and documentation.      All of the patient's excellent questions were answered to the best of my knowledge and to his satisfaction.  It was a pleasure seeing Mr. Elias Casanova in my clinic today. Return in about 4 weeks (around 12/29/2023). Patient is aware to call the cardiology clinic with any questions or concerns.      Simran Frankel MD  [facility]  [facility], [address]  [address] [address]  Phone: [phone]  Fax: [phone]

## 2024-01-09 ASSESSMENT — ENCOUNTER SYMPTOMS
WHEEZING: 0
ABDOMINAL PAIN: 0
NIGHT SWEATS: 0
VOMITING: 0
NEAR-SYNCOPE: 0
DIARRHEA: 0
COUGH: 0
WEAKNESS: 0
FOCAL WEAKNESS: 0
IRREGULAR HEARTBEAT: 0
DIZZINESS: 0
FEVER: 0
ORTHOPNEA: 0
PALPITATIONS: 0
DYSPNEA ON EXERTION: 0
SHORTNESS OF BREATH: 0
NAUSEA: 0
SYNCOPE: 0
PND: 0

## 2024-01-10 ENCOUNTER — TELEMEDICINE (OUTPATIENT)
Dept: CARDIOLOGY | Facility: MEDICAL CENTER | Age: 84
End: 2024-01-10
Attending: STUDENT IN AN ORGANIZED HEALTH CARE EDUCATION/TRAINING PROGRAM
Payer: MEDICARE

## 2024-01-10 ENCOUNTER — TELEPHONE (OUTPATIENT)
Dept: CARDIOLOGY | Facility: MEDICAL CENTER | Age: 84
End: 2024-01-10
Payer: MEDICARE

## 2024-01-10 NOTE — PROGRESS NOTES
Cardiology Follow-up Consultation Note/Virtual Video Visit Note    This evaluation was conducted via Zoom using secure and encrypted videoconferencing technology. The patient was in a private location outside of their home in the [facility].    The patient's identity was confirmed and verbal consent was obtained for this virtual visit.    Date of note:    ***  Primary Care Provider: Luz Quiroga D.O.    Patient Name: Elias Casanova   YOB: 1940  MRN:              [id number]    Chief Complaint: Follow-up HFrEF    History of Present Illness: Mr. Elias Casanova is an 83 y.o. male whose current medical problems include persistent A-fib, pulmonary hypertension, adrenal insufficiency on steroids (followed by endocrinology) and Parkinson's disease who is here for follow-up HFrEF.      The patient was last seen in my clinic on 12/1/2023 (his initial visit we, previously a patient of Dr. Latif).  Previously, the patient was hospitalized in October 2023, when he was found to mildly depressed LVEF with wall motion abnormalities.  The patient was evaluated by cardiology, and a decision was made to pursue medical management only.  The patient was hospitalized November 2023 for pneumonia in [place].  Due to elevated light chains with mild LVH, he was referred to hematology oncology for further workup for amyloidosis.  The patient had not been able to make an appointment yet.  He was started on Aldactone as well as low-dose metoprolol during the last visit.  Further up titration of GDMT was limited by low BP.    The patient was referred to EP previously for possible consideration of rhythm control.  The patient saw Dr. De La Cruz 4/5/2023.  The patient was continued on rate control.      The patient presents today for follow-up.***The patient reports feeling okay today.  He remains on supplemental oxygen after discharge.  He is currently living at SNF.  He reports occasional leg swelling.  He reports  shortness of breath.  Denies any chest pain.  No orthopnea or PND.  No palpitations.  No syncope or presyncopal episodes.    Cardiovascular Risk Factors:  1. Smoking status: Former smoker  2. Type II Diabetes Mellitus: None  Lab Results   Component Value Date/Time    HBA1C 5.5 11/24/2023 04:01 AM    HBA1C 5.5 10/27/2023 12:04 AM    HBA1C 5.6 03/10/2017 11:12 AM     3. Hypertension: None  4. Dyslipidemia: None  Cholesterol,Tot   Date Value Ref Range Status   10/27/2023 95 (L) 100 - 199 mg/dL Final     LDL   Date Value Ref Range Status   10/27/2023 59 <100 mg/dL Final     HDL   Date Value Ref Range Status   10/27/2023 32 (A) >=40 mg/dL Final     Triglycerides   Date Value Ref Range Status   10/27/2023 20 0 - 149 mg/dL Final     5. Family history of early Coronary Artery Disease in a first degree relative (Male less than 55 years of age; Female less than 65 years of age): None  6.  Obesity and/or Metabolic Syndrome: There is no height or weight on file to calculate BMI.  7. Sedentary lifestyle: Sedentary    Review of Systems   Constitutional: Negative for fever, malaise/fatigue and night sweats.   Cardiovascular:  Negative for chest pain, dyspnea on exertion, irregular heartbeat, leg swelling, near-syncope, orthopnea, palpitations, paroxysmal nocturnal dyspnea and syncope.   Respiratory:  Negative for cough, shortness of breath and wheezing.    Gastrointestinal:  Negative for abdominal pain, diarrhea, nausea and vomiting.   Neurological:  Negative for dizziness, focal weakness and weakness.         All other systems reviewed and are negative.         Current Outpatient Medications   Medication Sig Dispense Refill    acetaminophen (TYLENOL) 325 MG Tab Take  by mouth.      amoxicillin-clavulanate (AUGMENTIN) 875-125 MG Tab Take 1 Tablet by mouth 2 times a day.      aspirin (ASA) 81 MG Chew Tab chewable tablet Chew 81 mg every day.      digoxin (LANOXIN) 125 MCG Tab Take  by mouth every day.      bisacodyl (DULCOLAX) 10  MG Suppos Insert 10 mg into the rectum every day.      sodium phosphate (FLEET) 7-19 GM/118ML Enema Insert 1 Each into the rectum one time.      Ferrous Sulfate (IRON) 325 (65 Fe) MG Tab Take 325 mg by mouth.      LACTOBACILLUS RHAMNOSUS, GG, PO Take  by mouth.      magnesium hydroxide (MILK OF MAGNESIA) 400 MG/5ML Suspension Take 30 mL by mouth 1 time a day as needed.      metoprolol SR (TOPROL XL) 25 MG TABLET SR 24 HR Take 0.5 Tablets by mouth every day. 45 Tablet 3    spironolactone (ALDACTONE) 25 MG Tab Take 0.5 Tablets by mouth every day. 45 Tablet 3    albuterol 108 (90 Base) MCG/ACT Aero Soln inhalation aerosol Inhale 2 Puffs every four hours as needed for Shortness of Breath. 8.5 g     finasteride (PROSCAR) 5 MG Tab Take 1 Tablet by mouth every day. 30 Tablet 0    midodrine (PROAMATINE) 10 MG tablet Take 1 Tablet by mouth 3 times a day with meals. 60 Tablet 0    omeprazole (PRILOSEC) 20 MG delayed-release capsule Take 1 Capsule by mouth 2 times a day. 30 Capsule 0    vitamin D3 (CHOLECALCIFEROL) 1000 Unit (25 mcg) Tab Take 1,000 Units by mouth every day.      Melatonin 10 MG Tab Take 10 mg by mouth every day.      NP THYROID 60 MG Tab take one tablet by mouth every morning monday through saturday, take two tablets by mouth on sundays only (Patient taking differently: Take  mg by mouth every day. take one tablet by mouth every morning monday through saturday, take two tablets by mouth on sundays only) 120 Tablet 0    predniSONE (DELTASONE) 5 MG Tab Take 1 tab once daily po (Patient taking differently: Take 5 mg by mouth every day. Take 1 tab once daily po) 90 Tablet 1    NEUPRO 4 MG/24HR PATCH 24 HR Place 1 Patch on the skin every day.       No current facility-administered medications for this visit.         Allergies   Allergen Reactions    Novocain Unspecified     Eye swelling and redness       Physical Exam:  Ambulatory Vitals  There were no vitals taken for this visit.   Oxygen Therapy:     BP  Readings from Last 4 Encounters:   12/01/23 108/60   11/10/23 117/69   10/26/23 114/77   10/03/23 110/72       Weight/BMI: There is no height or weight on file to calculate BMI.  Wt Readings from Last 4 Encounters:   12/01/23 75.3 kg (166 lb)   11/10/23 75.4 kg (166 lb 3.6 oz)   10/26/23 62 kg (136 lb 11 oz)   10/03/23 63.7 kg (140 lb 6.9 oz)         General: Well appearing and in no apparent distress  HEENT: Normal  Neurological: No focal sensory deficits  Psychiatric: Appropriate affect, A/O x 3, intact judgement and insight        Lab Data Review:  Lab Results   Component Value Date/Time    CHOLSTRLTOT 95 (L) 10/27/2023 12:04 AM    LDL 59 10/27/2023 12:04 AM    HDL 32 (A) 10/27/2023 12:04 AM    TRIGLYCERIDE 20 10/27/2023 12:04 AM       Lab Results   Component Value Date/Time    SODIUM 135 11/10/2023 04:17 AM    POTASSIUM 4.9 11/10/2023 04:17 AM    CHLORIDE 105 11/10/2023 04:17 AM    CO2 21 11/10/2023 04:17 AM    GLUCOSE 78 11/10/2023 04:17 AM    BUN 23 (H) 11/10/2023 04:17 AM    CREATININE 0.73 11/10/2023 04:17 AM     Lab Results   Component Value Date/Time    ALKPHOSPHAT 89 11/02/2023 03:08 AM    ASTSGOT 101 (H) 11/02/2023 03:08 AM    ALTSGPT 424 (H) 11/02/2023 03:08 AM    TBILIRUBIN 0.2 11/02/2023 03:08 AM      Lab Results   Component Value Date/Time    WBC 9.6 11/10/2023 04:17 AM    HEMOGLOBIN 8.8 (L) 11/28/2023 04:27 AM    HEMOGLOBIN 8.9 (L) 11/10/2023 04:17 AM     Lab Results   Component Value Date/Time    HBA1C 5.5 11/24/2023 04:01 AM    HBA1C 5.5 10/27/2023 12:04 AM    HBA1C 5.6 03/10/2017 11:12 AM         Cardiac Imaging and Procedures Review:    EKG dated 11/1/2023: My personal interpretation is atrial fibrillation    Echo dated 11/20/2023  Interpretation Summary   Left ventricular systolic function is borderline reduced.   The Ejection Fraction estimate is 45-50%   The right ventricular systolic function is borderline reduced.   Injection of contrast documented no interatrial shunt.   There is mild  mitral regurgitation   There is no pericardial effusion.   Large left pleural effusion.     Echo dated 10/29/2023:   CONCLUSIONS  Low normal ventricular systolic function.  The ejection fraction is measured to be 52% by Carrion's biplane.  There is akinesis of the mid to distal septum and apex consistent with   LAD territory ischemia/infarct.  Diastolic function is difficult to assess with arrhythmia.  Normal right ventricular size with borderline reduced systolic   function.  Unable to estimate pulmonary artery pressure due to an inadequate   visualization of the inferior vena cava.  Severe biatrial dilation.  There appears to be a prominent Chiari network, however, would consider   a transesophageal echocardiogram if there was a strong concern for   endocarditis.  Eccentric, probably mild tricuspid regurgitation.  The inferior vena cava is not well visualized.      Assessment & Plan     No diagnosis found.        Shared Medical Decision Making:    HFrEF  NYHA class II stage C heart failure  Euvolemic but does report occasional leg swelling and remains on O2 after hospital discharge for PNA.  Likely ischemic etiology.  Patient would like to continue on with medical management only.  -Restart metoprolol at a lower dose of 12.5 mg daily  -Start Aldactone 12.5 mg daily  -BMP in 1 week  -Follow-up in 4 weeks  -BP low for adding any additional GDMT  -LVEF above 35%, no indication for ICD    Elevated light chains  -There is mild LVH with HFrEF, possible amyloidosis.  -Will refer to hematology oncology for further workup.    History of NSTEMI  Hospitalized 10/2023.  Medically managed.  Echocardiogram suggestive of infarct/ischemia but a decision was made to pursue medical management only.  -Not on dual antiplatelet due to concern for bleed/low hemoglobin  -Continue aspirin 81 mg daily previous cholesterol levels normal, will hold off statin, can consider next visit    Chronic A-fib  -Anticoagulation held due to concern  for bleed/low hemoglobin.    -Continue digoxin  -Restart metoprolol at a lower dose as above    A total of 41 minutes of time was spent on day of encounter reviewing medical record, performing history and examination, counseling, ordering medication/test/consults and documentation.      All of the patient's excellent questions were answered to the best of my knowledge and to his satisfaction.  It was a pleasure seeing Mr. Elias Casanova in my clinic today. No follow-ups on file. Patient is aware to call the cardiology clinic with any questions or concerns.      Simran Frankel MD  [facility], [address]ld[address]  [address] [address]  Phone: [phone]  Fax: [phone]

## 2024-01-10 NOTE — TELEPHONE ENCOUNTER
Called Edward to reschedule in appt with  from today Jan 10th. Edward had issues getting on to the virtual appt. LVM for him to call back to reschedule.

## 2024-01-11 ENCOUNTER — APPOINTMENT (OUTPATIENT)
Dept: HEMATOLOGY ONCOLOGY | Facility: MEDICAL CENTER | Age: 84
End: 2024-01-11
Payer: MEDICARE

## 2024-02-08 ENCOUNTER — TELEPHONE (OUTPATIENT)
Dept: HEMATOLOGY ONCOLOGY | Facility: MEDICAL CENTER | Age: 84
End: 2024-02-08
Payer: MEDICARE

## 2024-02-08 NOTE — TELEPHONE ENCOUNTER
Called and spoke to patient to see if he wanted to reschedule his missed appointment on 2/7/24 . Pt stated that he is currently in a rehab facility and will not be released home for another 10 days. He stated he would like to reschedule this appointment for 3/4 @ 3pm but he has a person that he needs to coordinated a ride with first. Pt asked that I call back on Monday 2/12 to get something scheduled.

## 2024-02-12 ENCOUNTER — TELEPHONE (OUTPATIENT)
Dept: HEMATOLOGY ONCOLOGY | Facility: MEDICAL CENTER | Age: 84
End: 2024-02-12
Payer: MEDICARE

## 2024-02-13 NOTE — TELEPHONE ENCOUNTER
Called patient to talk about rescheduling his appointment as discussed earlier last week via telephone. No answer , left call back number for patient and voicemail

## 2024-03-12 ASSESSMENT — ENCOUNTER SYMPTOMS
ABDOMINAL PAIN: 0
PALPITATIONS: 0
ORTHOPNEA: 0
WHEEZING: 0
FEVER: 0
DIZZINESS: 0
VOMITING: 0
WEAKNESS: 0
NIGHT SWEATS: 0
DYSPNEA ON EXERTION: 0
NEAR-SYNCOPE: 0
PND: 0
IRREGULAR HEARTBEAT: 0
DIARRHEA: 0
FOCAL WEAKNESS: 0
COUGH: 0
SHORTNESS OF BREATH: 0
SYNCOPE: 0
NAUSEA: 0

## 2024-03-12 NOTE — PROGRESS NOTES
Cardiology Follow-up Consultation Note/Virtual Video Visit Note    This evaluation was conducted via Zoom using secure and encrypted videoconferencing technology. The patient was in a private location outside of their home in the [facility].    The patient's identity was confirmed and verbal consent was obtained for this virtual visit.    Date of note:    ***  Primary Care Provider: Luz Quiroga D.O.    Patient Name: Elias Casanova   YOB: 1940  MRN:              [id number]    Chief Complaint: Follow-up HFrEF    History of Present Illness: Mr. Elias Casanova is an 83 y.o. male whose current medical problems include persistent A-fib, pulmonary hypertension, adrenal insufficiency on steroids (followed by endocrinology) and Parkinson's disease who is here for follow-up HFrEF.      The patient was last seen in my clinic on 12/1/2023 (his initial visit we, previously a patient of Dr. Latif).  Previously, the patient was hospitalized in October 2023, when he was found to mildly depressed LVEF with wall motion abnormalities.  The patient was evaluated by cardiology, and a decision was made to pursue medical management only.  The patient was hospitalized November 2023 for pneumonia in [place].  Due to elevated light chains with mild LVH, he was referred to hematology oncology for further workup for amyloidosis.  The patient had not been able to make an appointment yet.  He was started on Aldactone as well as low-dose metoprolol during the last visit.  Further up titration of GDMT was limited by low BP.    The patient was referred to EP previously for possible consideration of rhythm control.  The patient saw Dr. De La Cruz 4/5/2023.  The patient was continued on rate control.      The patient presents today for follow-up.***The patient reports feeling okay today.  He remains on supplemental oxygen after discharge.  He is currently living at SNF.  He reports occasional leg swelling.  He reports  shortness of breath.  Denies any chest pain.  No orthopnea or PND.  No palpitations.  No syncope or presyncopal episodes.    Cardiovascular Risk Factors:  1. Smoking status: Former smoker  2. Type II Diabetes Mellitus: None  Lab Results   Component Value Date/Time    HBA1C 4.6 02/21/2024 04:53 AM    HBA1C 5.5 11/24/2023 04:01 AM    HBA1C 5.5 10/27/2023 12:04 AM    HBA1C 5.6 03/10/2017 11:12 AM     3. Hypertension: None  4. Dyslipidemia: None  Cholesterol,Tot   Date Value Ref Range Status   10/27/2023 95 (L) 100 - 199 mg/dL Final     LDL   Date Value Ref Range Status   10/27/2023 59 <100 mg/dL Final     HDL   Date Value Ref Range Status   10/27/2023 32 (A) >=40 mg/dL Final     Triglycerides   Date Value Ref Range Status   10/27/2023 20 0 - 149 mg/dL Final     5. Family history of early Coronary Artery Disease in a first degree relative (Male less than 55 years of age; Female less than 65 years of age): None  6.  Obesity and/or Metabolic Syndrome: There is no height or weight on file to calculate BMI.  7. Sedentary lifestyle: Sedentary    Review of Systems   Constitutional: Negative for fever, malaise/fatigue and night sweats.   Cardiovascular:  Negative for chest pain, dyspnea on exertion, irregular heartbeat, leg swelling, near-syncope, orthopnea, palpitations, paroxysmal nocturnal dyspnea and syncope.   Respiratory:  Negative for cough, shortness of breath and wheezing.    Gastrointestinal:  Negative for abdominal pain, diarrhea, nausea and vomiting.   Neurological:  Negative for dizziness, focal weakness and weakness.         All other systems reviewed and are negative.         Current Outpatient Medications   Medication Sig Dispense Refill    acetaminophen (TYLENOL) 325 MG Tab Take  by mouth. (Patient not taking: Reported on 1/10/2024)      amoxicillin-clavulanate (AUGMENTIN) 875-125 MG Tab Take 1 Tablet by mouth 2 times a day. (Patient not taking: Reported on 1/10/2024)      aspirin (ASA) 81 MG Chew Tab chewable  tablet Chew 81 mg every day.      digoxin (LANOXIN) 125 MCG Tab Take  by mouth every day.      bisacodyl (DULCOLAX) 10 MG Suppos Insert 10 mg into the rectum every day.      sodium phosphate (FLEET) 7-19 GM/118ML Enema Insert 1 Each into the rectum one time.      Ferrous Sulfate (IRON) 325 (65 Fe) MG Tab Take 325 mg by mouth.      LACTOBACILLUS RHAMNOSUS, GG, PO Take  by mouth.      magnesium hydroxide (MILK OF MAGNESIA) 400 MG/5ML Suspension Take 30 mL by mouth 1 time a day as needed.      metoprolol SR (TOPROL XL) 25 MG TABLET SR 24 HR Take 0.5 Tablets by mouth every day. 45 Tablet 3    spironolactone (ALDACTONE) 25 MG Tab Take 0.5 Tablets by mouth every day. (Patient not taking: Reported on 1/10/2024) 45 Tablet 3    albuterol 108 (90 Base) MCG/ACT Aero Soln inhalation aerosol Inhale 2 Puffs every four hours as needed for Shortness of Breath. 8.5 g     finasteride (PROSCAR) 5 MG Tab Take 1 Tablet by mouth every day. 30 Tablet 0    midodrine (PROAMATINE) 10 MG tablet Take 1 Tablet by mouth 3 times a day with meals. 60 Tablet 0    omeprazole (PRILOSEC) 20 MG delayed-release capsule Take 1 Capsule by mouth 2 times a day. 30 Capsule 0    vitamin D3 (CHOLECALCIFEROL) 1000 Unit (25 mcg) Tab Take 1,000 Units by mouth every day.      Melatonin 10 MG Tab Take 10 mg by mouth every day.      NP THYROID 60 MG Tab take one tablet by mouth every morning monday through saturday, take two tablets by mouth on sundays only (Patient taking differently: Take  mg by mouth every day. take one tablet by mouth every morning monday through saturday, take two tablets by mouth on sundays only) 120 Tablet 0    predniSONE (DELTASONE) 5 MG Tab Take 1 tab once daily po (Patient taking differently: Take 5 mg by mouth every day. Take 1 tab once daily po) 90 Tablet 1    NEUPRO 4 MG/24HR PATCH 24 HR Place 1 Patch on the skin every day.       No current facility-administered medications for this visit.         Allergies   Allergen Reactions     Novocain Unspecified     Eye swelling and redness       Physical Exam:  Ambulatory Vitals  There were no vitals taken for this visit.   Oxygen Therapy:     BP Readings from Last 4 Encounters:   12/01/23 108/60   11/10/23 117/69   10/26/23 114/77   10/03/23 110/72       Weight/BMI: There is no height or weight on file to calculate BMI.  Wt Readings from Last 4 Encounters:   12/01/23 75.3 kg (166 lb)   11/10/23 75.4 kg (166 lb 3.6 oz)   10/26/23 62 kg (136 lb 11 oz)   10/03/23 63.7 kg (140 lb 6.9 oz)         General: Well appearing and in no apparent distress  HEENT: Normal  Neurological: No focal sensory deficits  Psychiatric: Appropriate affect, A/O x 3, intact judgement and insight        Lab Data Review:  Lab Results   Component Value Date/Time    CHOLSTRLTOT 95 (L) 10/27/2023 12:04 AM    LDL 59 10/27/2023 12:04 AM    HDL 32 (A) 10/27/2023 12:04 AM    TRIGLYCERIDE 20 10/27/2023 12:04 AM       Lab Results   Component Value Date/Time    SODIUM 135 11/10/2023 04:17 AM    POTASSIUM 4.9 11/10/2023 04:17 AM    CHLORIDE 105 11/10/2023 04:17 AM    CO2 21 11/10/2023 04:17 AM    GLUCOSE 78 11/10/2023 04:17 AM    BUN 23 (H) 11/10/2023 04:17 AM    CREATININE 0.73 11/10/2023 04:17 AM     Lab Results   Component Value Date/Time    ALKPHOSPHAT 89 11/02/2023 03:08 AM    ASTSGOT 101 (H) 11/02/2023 03:08 AM    ALTSGPT 424 (H) 11/02/2023 03:08 AM    TBILIRUBIN 0.2 11/02/2023 03:08 AM      Lab Results   Component Value Date/Time    WBC 9.6 11/10/2023 04:17 AM    HEMOGLOBIN 11.3 (L) 02/21/2024 04:53 AM    HEMOGLOBIN 8.9 (L) 11/10/2023 04:17 AM     Lab Results   Component Value Date/Time    HBA1C 4.6 02/21/2024 04:53 AM    HBA1C 5.5 11/24/2023 04:01 AM    HBA1C 5.5 10/27/2023 12:04 AM    HBA1C 5.6 03/10/2017 11:12 AM         Cardiac Imaging and Procedures Review:    EKG dated 11/1/2023: My personal interpretation is atrial fibrillation    Echo dated 11/20/2023  Interpretation Summary   Left ventricular systolic function is borderline  reduced.   The Ejection Fraction estimate is 45-50%   The right ventricular systolic function is borderline reduced.   Injection of contrast documented no interatrial shunt.   There is mild mitral regurgitation   There is no pericardial effusion.   Large left pleural effusion.     Echo dated 10/29/2023:   CONCLUSIONS  Low normal ventricular systolic function.  The ejection fraction is measured to be 52% by Carrion's biplane.  There is akinesis of the mid to distal septum and apex consistent with   LAD territory ischemia/infarct.  Diastolic function is difficult to assess with arrhythmia.  Normal right ventricular size with borderline reduced systolic   function.  Unable to estimate pulmonary artery pressure due to an inadequate   visualization of the inferior vena cava.  Severe biatrial dilation.  There appears to be a prominent Chiari network, however, would consider   a transesophageal echocardiogram if there was a strong concern for   endocarditis.  Eccentric, probably mild tricuspid regurgitation.  The inferior vena cava is not well visualized.      Assessment & Plan     No diagnosis found.        Shared Medical Decision Making:    HFrEF  NYHA class II stage C heart failure  Euvolemic but does report occasional leg swelling and remains on O2 after hospital discharge for PNA.  Likely ischemic etiology.  Patient would like to continue on with medical management only.  -Restart metoprolol at a lower dose of 12.5 mg daily  -Start Aldactone 12.5 mg daily  -BMP in 1 week  -Follow-up in 4 weeks  -BP low for adding any additional GDMT  -LVEF above 35%, no indication for ICD    Elevated light chains  -There is mild LVH with HFrEF, possible amyloidosis.  -Will refer to hematology oncology for further workup.    History of NSTEMI  Hospitalized 10/2023.  Medically managed.  Echocardiogram suggestive of infarct/ischemia but a decision was made to pursue medical management only.  -Not on dual antiplatelet due to concern for  bleed/low hemoglobin  -Continue aspirin 81 mg daily previous cholesterol levels normal, will hold off statin, can consider next visit    Chronic A-fib  -Anticoagulation held due to concern for bleed/low hemoglobin.    -Continue digoxin  -Restart metoprolol at a lower dose as above      All of the patient's excellent questions were answered to the best of my knowledge and to his satisfaction.  It was a pleasure seeing Mr. Elias Casanova in my clinic today. No follow-ups on file. Patient is aware to call the cardiology clinic with any questions or concerns.      Simran Frankel MD  [facility], [address]ld[address].  [address] [address]  Phone: [phone]  Fax: [phone]

## 2024-03-13 ENCOUNTER — APPOINTMENT (OUTPATIENT)
Dept: CARDIOLOGY | Facility: MEDICAL CENTER | Age: 84
End: 2024-03-13
Attending: STUDENT IN AN ORGANIZED HEALTH CARE EDUCATION/TRAINING PROGRAM
Payer: MEDICARE

## 2024-03-26 PROBLEM — N12 PYELONEPHRITIS: Status: ACTIVE | Noted: 2024-03-26

## 2024-03-26 PROBLEM — I95.89 HYPOTENSION DUE TO HYPOVOLEMIA: Status: ACTIVE | Noted: 2024-03-26

## 2024-03-26 PROBLEM — N39.0 ACUTE UTI: Status: ACTIVE | Noted: 2024-03-26

## 2024-03-26 PROBLEM — E86.1 HYPOTENSION DUE TO HYPOVOLEMIA: Status: ACTIVE | Noted: 2024-03-26

## 2024-03-26 PROBLEM — Z79.52 CURRENT CHRONIC USE OF SYSTEMIC STEROIDS: Status: ACTIVE | Noted: 2024-03-26

## 2024-03-26 PROBLEM — H10.503 BLEPHAROCONJUNCTIVITIS OF BOTH EYES: Status: ACTIVE | Noted: 2024-03-26

## 2024-03-31 PROBLEM — E86.1 HYPOTENSION DUE TO HYPOVOLEMIA: Status: RESOLVED | Noted: 2024-03-26 | Resolved: 2024-03-31

## 2024-03-31 PROBLEM — J18.9 PNEUMONIA OF BOTH LOWER LOBES DUE TO INFECTIOUS ORGANISM: Status: ACTIVE | Noted: 2024-03-31

## 2024-03-31 PROBLEM — B37.49 CANDIDA PYELONEPHRITIS: Status: ACTIVE | Noted: 2024-03-26

## 2024-03-31 PROBLEM — N32.0 BLADDER OUTLET OBSTRUCTION: Status: ACTIVE | Noted: 2024-03-31

## 2024-03-31 PROBLEM — N39.0 ACUTE UTI: Status: RESOLVED | Noted: 2024-03-26 | Resolved: 2024-03-31

## 2024-03-31 PROBLEM — I95.89 HYPOTENSION DUE TO HYPOVOLEMIA: Status: RESOLVED | Noted: 2024-03-26 | Resolved: 2024-03-31

## 2024-04-05 PROBLEM — R41.82 AMS (ALTERED MENTAL STATUS): Status: ACTIVE | Noted: 2024-04-05

## 2024-04-13 PROBLEM — R29.6 FALLS FREQUENTLY: Status: ACTIVE | Noted: 2024-04-13

## 2024-04-25 PROBLEM — L89.151 PRESSURE INJURY OF SACRAL REGION, STAGE 1: Status: ACTIVE | Noted: 2024-04-25

## 2024-04-26 PROBLEM — A41.9 SEPSIS WITH ENCEPHALOPATHY WITHOUT SEPTIC SHOCK (HCC): Status: ACTIVE | Noted: 2024-04-26

## 2024-04-26 PROBLEM — R65.20 SEPSIS WITH ENCEPHALOPATHY WITHOUT SEPTIC SHOCK (HCC): Status: ACTIVE | Noted: 2024-04-26

## 2024-04-26 PROBLEM — G93.41 SEPSIS WITH ENCEPHALOPATHY WITHOUT SEPTIC SHOCK (HCC): Status: ACTIVE | Noted: 2024-04-26

## 2024-04-26 PROBLEM — K59.09 OTHER CONSTIPATION: Status: ACTIVE | Noted: 2024-04-26

## 2024-04-26 PROBLEM — A41.9 SEPSIS (HCC): Status: ACTIVE | Noted: 2024-04-26

## 2024-04-26 PROBLEM — J18.9 PNEUMONIA OF BOTH LOWER LOBES DUE TO INFECTIOUS ORGANISM: Status: RESOLVED | Noted: 2024-03-31 | Resolved: 2024-04-26

## 2024-04-26 PROBLEM — B37.49 CANDIDA PYELONEPHRITIS: Status: RESOLVED | Noted: 2024-03-26 | Resolved: 2024-04-26

## 2024-04-26 PROBLEM — M62.82 NON-TRAUMATIC RHABDOMYOLYSIS: Status: RESOLVED | Noted: 2023-10-27 | Resolved: 2024-04-26

## 2024-09-11 PROBLEM — R29.6 FALLS FREQUENTLY: Status: RESOLVED | Noted: 2024-04-13 | Resolved: 2024-09-11

## 2024-09-11 PROBLEM — R41.82 AMS (ALTERED MENTAL STATUS): Status: RESOLVED | Noted: 2024-04-05 | Resolved: 2024-09-11

## 2024-09-13 PROBLEM — Z71.89 ADVANCE CARE PLANNING: Chronic | Status: ACTIVE | Noted: 2023-10-27

## 2024-09-13 PROBLEM — I10 ESSENTIAL (PRIMARY) HYPERTENSION: Chronic | Status: ACTIVE | Noted: 2023-11-10

## 2024-09-13 PROBLEM — G20.A1 PARKINSON DISEASE (HCC): Chronic | Status: RESOLVED | Noted: 2018-06-29 | Resolved: 2024-09-13

## 2024-09-13 PROBLEM — D64.9 ANEMIA: Chronic | Status: ACTIVE | Noted: 2023-10-27

## 2024-09-13 PROBLEM — Z79.890 HORMONE REPLACEMENT THERAPY: Status: RESOLVED | Noted: 2018-06-29 | Resolved: 2024-09-13

## 2024-09-13 PROBLEM — R91.1 SOLITARY PULMONARY NODULE: Chronic | Status: ACTIVE | Noted: 2023-11-28

## 2024-09-13 PROBLEM — G47.30 SLEEP APNEA: Chronic | Status: ACTIVE | Noted: 2018-06-29

## 2024-09-13 PROBLEM — M81.0 AGE RELATED OSTEOPOROSIS: Chronic | Status: ACTIVE | Noted: 2021-04-27

## 2024-09-13 PROBLEM — I10 ESSENTIAL (PRIMARY) HYPERTENSION: Status: ACTIVE | Noted: 2023-11-10

## 2024-09-13 PROBLEM — Z79.52 CURRENT CHRONIC USE OF SYSTEMIC STEROIDS: Chronic | Status: ACTIVE | Noted: 2024-03-26

## 2024-09-13 PROBLEM — N40.0 BENIGN PROSTATIC HYPERPLASIA WITHOUT LOWER URINARY TRACT SYMPTOMS: Status: ACTIVE | Noted: 2024-02-21

## 2024-09-13 PROBLEM — D72.829 LEUKOCYTOSIS: Status: RESOLVED | Noted: 2020-07-25 | Resolved: 2024-09-13

## 2024-09-13 PROBLEM — R91.1 SOLITARY PULMONARY NODULE: Status: ACTIVE | Noted: 2023-11-28

## 2024-09-13 PROBLEM — Z79.01 ANTICOAGULATED: Chronic | Status: ACTIVE | Noted: 2023-04-05

## 2024-09-13 PROBLEM — E03.9 HYPOTHYROIDISM, UNSPECIFIED: Status: ACTIVE | Noted: 2023-11-10

## 2024-09-13 PROBLEM — I63.9 OCCIPITAL CEREBRAL INFARCTION (HCC): Chronic | Status: ACTIVE | Noted: 2024-09-13

## 2024-09-13 PROBLEM — K59.09 OTHER CONSTIPATION: Chronic | Status: ACTIVE | Noted: 2024-04-26

## 2024-09-13 PROBLEM — N40.0 BENIGN PROSTATIC HYPERPLASIA WITHOUT LOWER URINARY TRACT SYMPTOMS: Chronic | Status: ACTIVE | Noted: 2024-02-21

## 2024-09-13 PROBLEM — L89.151 PRESSURE INJURY OF SACRAL REGION, STAGE 1: Chronic | Status: ACTIVE | Noted: 2024-04-25

## 2024-09-13 PROBLEM — Z99.81 DEPENDENCE ON SUPPLEMENTAL OXYGEN: Status: ACTIVE | Noted: 2023-11-28

## 2024-09-13 PROBLEM — R53.1 GENERALIZED WEAKNESS: Chronic | Status: ACTIVE | Noted: 2020-07-25

## 2024-09-13 PROBLEM — G20.A1 PARKINSON'S DISEASE (HCC): Status: ACTIVE | Noted: 2023-04-12

## 2024-09-13 PROBLEM — A41.9 SEPSIS (HCC): Status: RESOLVED | Noted: 2024-04-26 | Resolved: 2024-09-13

## 2024-09-13 PROBLEM — I21.4 NSTEMI (NON-ST ELEVATED MYOCARDIAL INFARCTION) (HCC): Chronic | Status: ACTIVE | Noted: 2023-10-26

## 2024-09-13 PROBLEM — G20.A1 PARKINSON'S DISEASE (HCC): Chronic | Status: ACTIVE | Noted: 2023-04-12

## 2024-09-13 PROBLEM — E03.9 HYPOTHYROIDISM, UNSPECIFIED: Chronic | Status: ACTIVE | Noted: 2023-11-10

## 2024-09-13 PROBLEM — H10.503 BLEPHAROCONJUNCTIVITIS OF BOTH EYES: Status: RESOLVED | Noted: 2024-03-26 | Resolved: 2024-09-13

## 2024-09-13 PROBLEM — I63.9 OCCIPITAL CEREBRAL INFARCTION (HCC): Status: ACTIVE | Noted: 2024-09-13

## 2024-09-13 PROBLEM — I10 ESSENTIAL (PRIMARY) HYPERTENSION: Chronic | Status: RESOLVED | Noted: 2023-11-10 | Resolved: 2024-09-13

## 2024-10-04 PROBLEM — Z97.8 FOLEY CATHETER IN PLACE: Status: ACTIVE | Noted: 2024-10-04

## 2025-01-03 PROBLEM — L60.8 DISCOLORATION OF NAIL: Status: ACTIVE | Noted: 2025-01-03

## 2025-03-14 PROBLEM — N28.0 RENAL INFARCT (HCC): Status: ACTIVE | Noted: 2025-03-14

## 2025-04-07 ENCOUNTER — APPOINTMENT (OUTPATIENT)
Dept: URBAN - METROPOLITAN AREA CLINIC 31 | Facility: CLINIC | Age: 85
Setting detail: DERMATOLOGY
End: 2025-04-07

## 2025-04-07 DIAGNOSIS — L60.8 OTHER NAIL DISORDERS: ICD-10-CM

## 2025-04-07 DIAGNOSIS — L21.8 OTHER SEBORRHEIC DERMATITIS: ICD-10-CM

## 2025-04-07 PROCEDURE — ? MEDICATION COUNSELING

## 2025-04-07 PROCEDURE — 99204 OFFICE O/P NEW MOD 45 MIN: CPT

## 2025-04-07 PROCEDURE — ? PRESCRIPTION

## 2025-04-07 PROCEDURE — ? TREATMENT GOALS

## 2025-04-07 PROCEDURE — ? ADDITIONAL NOTES

## 2025-04-07 PROCEDURE — ? PRESCRIPTION MEDICATION MANAGEMENT

## 2025-04-07 PROCEDURE — ? COUNSELING

## 2025-04-07 RX ORDER — KETOCONAZOLE 20 MG/G
CREAM TOPICAL BID
Qty: 60 | Refills: 2 | Status: ERX | COMMUNITY
Start: 2025-04-07

## 2025-04-07 RX ORDER — CICLOPIROX 7.7 MG/G
GEL TOPICAL DAILY
Qty: 100 | Refills: 2 | Status: ERX | COMMUNITY
Start: 2025-04-07

## 2025-04-07 RX ORDER — GENTAMICIN SULFATE 0.3 %
DROPS OPHTHALMIC (EYE)
Qty: 15 | Refills: 2 | Status: ERX | COMMUNITY
Start: 2025-04-07

## 2025-04-07 RX ADMIN — CICLOPIROX: 7.7 GEL TOPICAL at 00:00

## 2025-04-07 RX ADMIN — Medication: at 00:00

## 2025-04-07 RX ADMIN — KETOCONAZOLE: 20 CREAM TOPICAL at 00:00

## 2025-04-07 NOTE — PROCEDURE: MEDICATION COUNSELING
Zepbound Pregnancy And Lactation Text: The fetal risk of this medication is unknown and taking while pregnant is not recommended. It is unknown if this medication is present in breast milk.
Dapsone Pregnancy And Lactation Text: This medication is Pregnancy Category C and is not considered safe during pregnancy or breast feeding.
Sarecycline Counseling: Patient advised regarding possible photosensitivity and discoloration of the teeth, skin, lips, tongue and gums.  Patient instructed to avoid sunlight, if possible.  When exposed to sunlight, patients should wear protective clothing, sunglasses, and sunscreen.  The patient was instructed to call the office immediately if the following severe adverse effects occur:  hearing changes, easy bruising/bleeding, severe headache, or vision changes.  The patient verbalized understanding of the proper use and possible adverse effects of sarecycline.  All of the patient's questions and concerns were addressed.
Cimzia Counseling:  I discussed with the patient the risks of Cimzia including but not limited to immunosuppression, allergic reactions and infections.  The patient understands that monitoring is required including a PPD at baseline and must alert us or the primary physician if symptoms of infection or other concerning signs are noted.
Vtama Pregnancy And Lactation Text: It is unknown if this medication can cause problems during pregnancy and breastfeeding.
Bexarotene Counseling:  I discussed with the patient the risks of bexarotene including but not limited to hair loss, dry lips/skin/eyes, liver abnormalities, hyperlipidemia, pancreatitis, depression/suicidal ideation, photosensitivity, drug rash/allergic reactions, hypothyroidism, anemia, leukopenia, infection, cataracts, and teratogenicity.  Patient understands that they will need regular blood tests to check lipid profile, liver function tests, white blood cell count, thyroid function tests and pregnancy test if applicable.
Picato Pregnancy And Lactation Text: This medication is Pregnancy Category C. It is unknown if this medication is excreted in breast milk.
Xolair Pregnancy And Lactation Text: This medication is Pregnancy Category B and is considered safe during pregnancy. This medication is excreted in breast milk.
Drysol Pregnancy And Lactation Text: This medication is considered safe during pregnancy and breast feeding.
Nemluvio Counseling: I discussed with the patient the risks of nemolizumab including but not limited to headache, gastrointestinal complaints, nasopharyngitis, musculoskeletal complaints, injection site reactions, and allergic reactions. The patient understands that monitoring is required and they must alert us or the primary physician if any side effects are noted.
Tremfya Counseling: I discussed with the patient the risks of guselkumab including but not limited to immunosuppression, serious infections, and drug reactions.  The patient understands that monitoring is required including a PPD at baseline and must alert us or the primary physician if symptoms of infection or other concerning signs are noted.
Otezla Counseling: The side effects of Otezla were discussed with the patient, including but not limited to worsening or new depression, weight loss, diarrhea, nausea, upper respiratory tract infection, and headache. Patient instructed to call the office should any adverse effect occur.  The patient verbalized understanding of the proper use and possible adverse effects of Otezla.  All the patient's questions and concerns were addressed.
Cimetidine Pregnancy And Lactation Text: This medication is Pregnancy Category B and is considered safe during pregnancy. It is also excreted in breast milk and breast feeding isn't recommended.
Dapsone Counseling: I discussed with the patient the risks of dapsone including but not limited to hemolytic anemia, agranulocytosis, rashes, methemoglobinemia, kidney failure, peripheral neuropathy, headaches, GI upset, and liver toxicity.  Patients who start dapsone require monitoring including baseline LFTs and weekly CBCs for the first month, then every month thereafter.  The patient verbalized understanding of the proper use and possible adverse effects of dapsone.  All of the patient's questions and concerns were addressed.
Rifampin Pregnancy And Lactation Text: This medication is Pregnancy Category C and it isn't know if it is safe during pregnancy. It is also excreted in breast milk and should not be used if you are breast feeding.
Acitretin Pregnancy And Lactation Text: This medication is Pregnancy Category X and should not be given to women who are pregnant or may become pregnant in the future. This medication is excreted in breast milk.
Cimzia Pregnancy And Lactation Text: This medication crosses the placenta but can be considered safe in certain situations. Cimzia may be excreted in breast milk.
VTAMA Counseling: I discussed with the patient that VTAMA is not for use in the eyes, mouth or mouth. They should call the office if they develop any signs of allergic reactions to VTAMA. The patient verbalized understanding of the proper use and possible adverse effects of VTAMA.  All of the patient's questions and concerns were addressed.
Taltz Counseling: I discussed with the patient the risks of ixekizumab including but not limited to immunosuppression, serious infections, worsening of inflammatory bowel disease and drug reactions.  The patient understands that monitoring is required including a PPD at baseline and must alert us or the primary physician if symptoms of infection or other concerning signs are noted.
Cibinqo Counseling: I discussed with the patient the risks of Cibinqo therapy including but not limited to common cold, nausea, headache, cold sores, increased blood CPK levels, dizziness, UTIs, fatigue, acne, and vomitting. Live vaccines should be avoided.  This medication has been linked to serious infections; higher rate of mortality; malignancy and lymphoproliferative disorders; major adverse cardiovascular events; thrombosis; thrombocytopenia and lymphopenia; lipid elevations; and retinal detachment.
Oxybutynin Counseling:  I discussed with the patient the risks of oxybutynin including but not limited to skin rash, drowsiness, dry mouth, difficulty urinating, and blurred vision.
Gabapentin Counseling: I discussed with the patient the risks of gabapentin including but not limited to dizziness, somnolence, fatigue and ataxia.
Spironolactone Counseling: Patient advised regarding risks of diarrhea, abdominal pain, hyperkalemia, birth defects (for female patients), liver toxicity and renal toxicity. The patient may need blood work to monitor liver and kidney function and potassium levels while on therapy. The patient verbalized understanding of the proper use and possible adverse effects of spironolactone.  All of the patient's questions and concerns were addressed.
Bimzelx Pregnancy And Lactation Text: This medication crosses the placenta and the safety is uncertain during pregnancy. It is unknown if this medication is present in breast milk.
Bexarotene Pregnancy And Lactation Text: This medication is Pregnancy Category X and should not be given to women who are pregnant or may become pregnant. This medication should not be used if you are breast feeding.
Protopic Counseling: Patient may experience a mild burning sensation during topical application. Protopic is not approved in children less than 2 years of age. There have been case reports of hematologic and skin malignancies in patients using topical calcineurin inhibitors although causality is questionable.
Elidel Counseling: Patient may experience a mild burning sensation during topical application. Elidel is not approved in children less than 2 years of age. There have been case reports of hematologic and skin malignancies in patients using topical calcineurin inhibitors although causality is questionable.
Infliximab Pregnancy And Lactation Text: This medication is Pregnancy Category B and is considered safe during pregnancy. It is unknown if this medication is excreted in breast milk.
Bactrim Counseling:  I discussed with the patient the risks of sulfa antibiotics including but not limited to GI upset, allergic reaction, drug rash, diarrhea, dizziness, photosensitivity, and yeast infections.  Rarely, more serious reactions can occur including but not limited to aplastic anemia, agranulocytosis, methemoglobinemia, blood dyscrasias, liver or kidney failure, lung infiltrates or desquamative/blistering drug rashes.
Birth Control Pills Pregnancy And Lactation Text: This medication should be avoided if pregnant and for the first 30 days post-partum.
Taltz Pregnancy And Lactation Text: The risk during pregnancy and breastfeeding is uncertain with this medication.
Otezla Pregnancy And Lactation Text: This medication is Pregnancy Category C and it isn't known if it is safe during pregnancy. It is unknown if it is excreted in breast milk.
Cimetidine Counseling:  I discussed with the patient the risks of Cimetidine including but not limited to gynecomastia, headache, diarrhea, nausea, drowsiness, arrhythmias, pancreatitis, skin rashes, psychosis, bone marrow suppression and kidney toxicity.
Tetracycline Counseling: Patient counseled regarding possible photosensitivity and increased risk for sunburn.  Patient instructed to avoid sunlight, if possible.  When exposed to sunlight, patients should wear protective clothing, sunglasses, and sunscreen.  The patient was instructed to call the office immediately if the following severe adverse effects occur:  hearing changes, easy bruising/bleeding, severe headache, or vision changes.  The patient verbalized understanding of the proper use and possible adverse effects of tetracycline.  All of the patient's questions and concerns were addressed. Patient understands to avoid pregnancy while on therapy due to potential birth defects.
Isotretinoin Counseling: Patient should get monthly blood tests, not donate blood, not drive at night if vision affected, not share medication, and not undergo elective surgery for 6 months after tx completed. Side effects reviewed, pt to contact office should one occur.
Cephalexin Counseling: I counseled the patient regarding use of cephalexin as an antibiotic for prophylactic and/or therapeutic purposes. Cephalexin (commonly prescribed under brand name Keflex) is a cephalosporin antibiotic which is active against numerous classes of bacteria, including most skin bacteria. Side effects may include nausea, diarrhea, gastrointestinal upset, rash, hives, yeast infections, and in rare cases, hepatitis, kidney disease, seizures, fever, confusion, neurologic symptoms, and others. Patients with severe allergies to penicillin medications are cautioned that there is about a 10% incidence of cross-reactivity with cephalosporins. When possible, patients with penicillin allergies should use alternatives to cephalosporins for antibiotic therapy.
Cibinqo Pregnancy And Lactation Text: It is unknown if this medication will adversely affect pregnancy or breast feeding.  You should not take this medication if you are currently pregnant or planning a pregnancy or while breastfeeding.
Oxybutynin Pregnancy And Lactation Text: This medication is Pregnancy Category B and is considered safe during pregnancy. It is unknown if it is excreted in breast milk.
Spironolactone Pregnancy And Lactation Text: This medication can cause feminization of the male fetus and should be avoided during pregnancy. The active metabolite is also found in breast milk.
Gabapentin Pregnancy And Lactation Text: This medication is Pregnancy Category C and isn't considered safe during pregnancy. It is excreted in breast milk.
Rituxan Counseling:  I discussed with the patient the risks of Rituxan infusions. Side effects can include infusion reactions, severe drug rashes including mucocutaneous reactions, reactivation of latent hepatitis and other infections and rarely progressive multifocal leukoencephalopathy.  All of the patient's questions and concerns were addressed.
Protopic Pregnancy And Lactation Text: This medication is Pregnancy Category C. It is unknown if this medication is excreted in breast milk when applied topically.
Sarecycline Pregnancy And Lactation Text: This medication is Pregnancy Category D and not consider safe during pregnancy. It is also excreted in breast milk.
Doxepin Pregnancy And Lactation Text: This medication is Pregnancy Category C and it isn't known if it is safe during pregnancy. It is also excreted in breast milk and breast feeding isn't recommended.
Bactrim Pregnancy And Lactation Text: This medication is Pregnancy Category D and is known to cause fetal risk.  It is also excreted in breast milk.
Zoryve Counseling:  I discussed with the patient that Zoryve is not for use in the eyes, mouth or vagina. The most commonly reported side effects include diarrhea, headache, insomnia, application site pain, upper respiratory tract infections, and urinary tract infections.  All of the patient's questions and concerns were addressed.
Erivedge Pregnancy And Lactation Text: This medication is Pregnancy Category X and is absolutely contraindicated during pregnancy. It is unknown if it is excreted in breast milk.
Zyclara Counseling:  I discussed with the patient the risks of imiquimod including but not limited to erythema, scaling, itching, weeping, crusting, and pain.  Patient understands that the inflammatory response to imiquimod is variable from person to person and was educated regarded proper titration schedule.  If flu-like symptoms develop, patient knows to discontinue the medication and contact us.
Cephalexin Pregnancy And Lactation Text: This medication is Pregnancy Category B and considered safe during pregnancy.  It is also excreted in breast milk but can be used safely for shorter doses.
Litfulo Counseling: I discussed with the patient the risks of Litfulo therapy including but not limited to upper respiratory tract infections, shingles, cold sores, and nausea. Live vaccines should be avoided.  This medication has been linked to serious infections; higher rate of mortality; malignancy and lymphoproliferative disorders; major adverse cardiovascular events; thrombosis; gastrointestinal perforations; neutropenia; lymphopenia; anemia; liver enzyme elevations; and lipid elevations.
Isotretinoin Pregnancy And Lactation Text: This medication is Pregnancy Category X and is considered extremely dangerous during pregnancy. It is unknown if it is excreted in breast milk.
Topical Ketoconazole Counseling: Patient counseled that this medication may cause skin irritation or allergic reactions.  In the event of skin irritation, the patient was advised to reduce the amount of the drug applied or use it less frequently.   The patient verbalized understanding of the proper use and possible adverse effects of ketoconazole.  All of the patient's questions and concerns were addressed.
Propranolol Counseling:  I discussed with the patient the risks of propranolol including but not limited to low heart rate, low blood pressure, low blood sugar, restlessness and increased cold sensitivity. They should call the office if they experience any of these side effects.
Glycopyrrolate Counseling:  I discussed with the patient the risks of glycopyrrolate including but not limited to skin rash, drowsiness, dry mouth, difficulty urinating, and blurred vision.
Aklief counseling:  Patient advised to apply a pea-sized amount only at bedtime and wait 30 minutes after washing their face before applying.  If too drying, patient may add a non-comedogenic moisturizer.  The most commonly reported side effects including irritation, redness, scaling, dryness, stinging, burning, itching, and increased risk of sunburn.  The patient verbalized understanding of the proper use and possible adverse effects of retinoids.  All of the patient's questions and concerns were addressed.
Eucrisa Counseling: Patient may experience a mild burning sensation during topical application. Eucrisa is not approved in children less than 3 months of age.
Doxepin Counseling:  Patient advised that the medication is sedating and not to drive a car after taking this medication. Patient informed of potential adverse effects including but not limited to dry mouth, urinary retention, and blurry vision.  The patient verbalized understanding of the proper use and possible adverse effects of doxepin.  All of the patient's questions and concerns were addressed.
Cosentyx Counseling:  I discussed with the patient the risks of Cosentyx including but not limited to worsening of Crohn's disease, immunosuppression, allergic reactions and infections.  The patient understands that monitoring is required including a PPD at baseline and must alert us or the primary physician if symptoms of infection or other concerning signs are noted.
Qbrexza Counseling:  I discussed with the patient the risks of Qbrexza including but not limited to headache, mydriasis, blurred vision, dry eyes, nasal dryness, dry mouth, dry throat, dry skin, urinary hesitation, and constipation.  Local skin reactions including erythema, burning, stinging, and itching can also occur.
Erivedge Counseling- I discussed with the patient the risks of Erivedge including but not limited to nausea, vomiting, diarrhea, constipation, weight loss, changes in the sense of taste, decreased appetite, muscle spasms, and hair loss.  The patient verbalized understanding of the proper use and possible adverse effects of Erivedge.  All of the patient's questions and concerns were addressed.
Nemluvio Pregnancy And Lactation Text: It is not known if Nemluvio causes fetal harm or is present in breast milk. Please proceed with caution if patients who are pregnant or breastfeeding.
Dupixent Pregnancy And Lactation Text: This medication likely crosses the placenta but the risk for the fetus is uncertain. This medication is excreted in breast milk.
Siliq Counseling:  I discussed with the patient the risks of Siliq including but not limited to new or worsening depression, suicidal thoughts and behavior, immunosuppression, malignancy, posterior leukoencephalopathy syndrome, and serious infections.  The patient understands that monitoring is required including a PPD at baseline and must alert us or the primary physician if symptoms of infection or other concerning signs are noted. There is also a special program designed to monitor depression which is required with Siliq.
Glycopyrrolate Pregnancy And Lactation Text: This medication is Pregnancy Category B and is considered safe during pregnancy. It is unknown if it is excreted breast milk.
Libtayo Counseling- I discussed with the patient the risks of Libtayo including but not limited to nausea, vomiting, diarrhea, and bone or muscle pain.  The patient verbalized understanding of the proper use and possible adverse effects of Libtayo.  All of the patient's questions and concerns were addressed.
High Dose Vitamin A Counseling: Side effects reviewed, pt to contact office should one occur.
Clindamycin Counseling: I counseled the patient regarding use of clindamycin as an antibiotic for prophylactic and/or therapeutic purposes. Clindamycin is active against numerous classes of bacteria, including skin bacteria. Side effects may include nausea, diarrhea, gastrointestinal upset, rash, hives, yeast infections, and in rare cases, colitis.
Hydroxyzine Pregnancy And Lactation Text: This medication is not safe during pregnancy and should not be taken. It is also excreted in breast milk and breast feeding isn't recommended.
Qbrexza Pregnancy And Lactation Text: There is no available data on Qbrexza use in pregnant women.  There is no available data on Qbrexza use in lactation.
Litfulo Pregnancy And Lactation Text: Based on animal studies, Lifulo may cause embryo-fetal harm when administered to pregnant women.  The medication should not be used in pregnancy.  Breastfeeding is not recommended during treatment.
Eucrisa Pregnancy And Lactation Text: This medication has not been assigned a Pregnancy Risk Category but animal studies failed to show danger with the topical medication. It is unknown if the medication is excreted in breast milk.
Propranolol Pregnancy And Lactation Text: This medication is Pregnancy Category C and it isn't known if it is safe during pregnancy. It is excreted in breast milk.
Aklief Pregnancy And Lactation Text: It is unknown if this medication is safe to use during pregnancy.  It is unknown if this medication is excreted in breast milk.  Breastfeeding women should use the topical cream on the smallest area of the skin for the shortest time needed while breastfeeding.  Do not apply to nipple and areola.
Azathioprine Counseling:  I discussed with the patient the risks of azathioprine including but not limited to myelosuppression, immunosuppression, hepatotoxicity, lymphoma, and infections.  The patient understands that monitoring is required including baseline LFTs, Creatinine, possible TPMP genotyping and weekly CBCs for the first month and then every 2 weeks thereafter.  The patient verbalized understanding of the proper use and possible adverse effects of azathioprine.  All of the patient's questions and concerns were addressed.
Libtayo Pregnancy And Lactation Text: This medication is contraindicated in pregnancy and when breast feeding.
Dupixent Counseling: I discussed with the patient the risks of dupilumab including but not limited to eye inflammation and irritation, cold sores, injection site reactions, allergic reactions and increased risk of parasitic infection. The patient understands that monitoring is required and they must alert us or the primary physician if symptoms of infection or other concerning signs are noted.
Rituxan Pregnancy And Lactation Text: This medication is Pregnancy Category C and it isn't know if it is safe during pregnancy. It is unknown if this medication is excreted in breast milk but similar antibodies are known to be excreted.
Hydroxychloroquine Counseling:  I discussed with the patient that a baseline ophthalmologic exam is needed at the start of therapy and every year thereafter while on therapy. A CBC may also be warranted for monitoring.  The side effects of this medication were discussed with the patient, including but not limited to agranulocytosis, aplastic anemia, seizures, rashes, retinopathy, and liver toxicity. Patient instructed to call the office should any adverse effect occur.  The patient verbalized understanding of the proper use and possible adverse effects of Plaquenil.  All the patient's questions and concerns were addressed.
Clindamycin Pregnancy And Lactation Text: This medication can be used in pregnancy if certain situations. Clindamycin is also present in breast milk.
Olumiant Counseling: I discussed with the patient the risks of Olumiant therapy including but not limited to upper respiratory tract infections, shingles, cold sores, and nausea. Live vaccines should be avoided.  This medication has been linked to serious infections; higher rate of mortality; malignancy and lymphoproliferative disorders; major adverse cardiovascular events; thrombosis; gastrointestinal perforations; neutropenia; lymphopenia; anemia; liver enzyme elevations; and lipid elevations.
Hydroxyzine Counseling: Patient advised that the medication is sedating and not to drive a car after taking this medication.  Patient informed of potential adverse effects including but not limited to dry mouth, urinary retention, and blurry vision.  The patient verbalized understanding of the proper use and possible adverse effects of hydroxyzine.  All of the patient's questions and concerns were addressed.
High Dose Vitamin A Pregnancy And Lactation Text: High dose vitamin A therapy is contraindicated during pregnancy and breast feeding.
Hydroquinone Counseling:  Patient advised that medication may result in skin irritation, lightening (hypopigmentation), dryness, and burning.  In the event of skin irritation, the patient was advised to reduce the amount of the drug applied or use it less frequently.  Rarely, spots that are treated with hydroquinone can become darker (pseudoochronosis).  Should this occur, patient instructed to stop medication and call the office. The patient verbalized understanding of the proper use and possible adverse effects of hydroquinone.  All of the patient's questions and concerns were addressed.
SSKI Counseling:  I discussed with the patient the risks of SSKI including but not limited to thyroid abnormalities, metallic taste, GI upset, fever, headache, acne, arthralgias, paraesthesias, lymphadenopathy, easy bleeding, arrhythmias, and allergic reaction.
Rhofade Counseling: Rhofade is a topical medication which can decrease superficial blood flow where applied. Side effects are uncommon and include stinging, redness and allergic reactions.
Azelaic Acid Counseling: Patient counseled that medicine may cause skin irritation and to avoid applying near the eyes.  In the event of skin irritation, the patient was advised to reduce the amount of the drug applied or use it less frequently.   The patient verbalized understanding of the proper use and possible adverse effects of azelaic acid.  All of the patient's questions and concerns were addressed.
Imiquimod Counseling:  I discussed with the patient the risks of imiquimod including but not limited to erythema, scaling, itching, weeping, crusting, and pain.  Patient understands that the inflammatory response to imiquimod is variable from person to person and was educated regarded proper titration schedule.  If flu-like symptoms develop, patient knows to discontinue the medication and contact us.
Solaraze Counseling:  I discussed with the patient the risks of Solaraze including but not limited to erythema, scaling, itching, weeping, crusting, and pain.
Ebglyss Pregnancy And Lactation Text: This medication likely crosses the placenta but the risk for the fetus is uncertain. It is unknown if this medication is excreted in breast milk.
Doxycycline Counseling:  Patient counseled regarding possible photosensitivity and increased risk for sunburn.  Patient instructed to avoid sunlight, if possible.  When exposed to sunlight, patients should wear protective clothing, sunglasses, and sunscreen.  The patient was instructed to call the office immediately if the following severe adverse effects occur:  hearing changes, easy bruising/bleeding, severe headache, or vision changes.  The patient verbalized understanding of the proper use and possible adverse effects of doxycycline.  All of the patient's questions and concerns were addressed.
Olumiant Pregnancy And Lactation Text: Based on animal studies, Olumiant may cause embryo-fetal harm when administered to pregnant women.  The medication should not be used in pregnancy.  Breastfeeding is not recommended during treatment.
Odomzo Counseling- I discussed with the patient the risks of Odomzo including but not limited to nausea, vomiting, diarrhea, constipation, weight loss, changes in the sense of taste, decreased appetite, muscle spasms, and hair loss.  The patient verbalized understanding of the proper use and possible adverse effects of Odomzo.  All of the patient's questions and concerns were addressed.
Topical Metronidazole Counseling: Metronidazole is a topical antibiotic medication. You may experience burning, stinging, redness, or allergic reactions.  Please call our office if you develop any problems from using this medication.
Sski Pregnancy And Lactation Text: This medication is Pregnancy Category D and isn't considered safe during pregnancy. It is excreted in breast milk.
Hydroxychloroquine Pregnancy And Lactation Text: This medication has been shown to cause fetal harm but it isn't assigned a Pregnancy Risk Category. There are small amounts excreted in breast milk.
Azathioprine Pregnancy And Lactation Text: This medication is Pregnancy Category D and isn't considered safe during pregnancy. It is unknown if this medication is excreted in breast milk.
Fluconazole Counseling:  Patient counseled regarding adverse effects of fluconazole including but not limited to headache, diarrhea, nausea, upset stomach, liver function test abnormalities, taste disturbance, and stomach pain.  There is a rare possibility of liver failure that can occur when taking fluconazole.  The patient understands that monitoring of LFTs and kidney function test may be required, especially at baseline. The patient verbalized understanding of the proper use and possible adverse effects of fluconazole.  All of the patient's questions and concerns were addressed.
Rhofade Pregnancy And Lactation Text: This medication has not been assigned a Pregnancy Risk Category. It is unknown if the medication is excreted in breast milk.
Low Dose Naltrexone Pregnancy And Lactation Text: Naltrexone is pregnancy category C.  There have been no adequate and well-controlled studies in pregnant women.  It should be used in pregnancy only if the potential benefit justifies the potential risk to the fetus.   Limited data indicates that naltrexone is minimally excreted into breastmilk.
Benzoyl Peroxide Pregnancy And Lactation Text: This medication is Pregnancy Category C. It is unknown if benzoyl peroxide is excreted in breast milk.
Ozempic Counseling: I reviewed the possible side effects including: thyroid tumors, kidney disease, gallbladder disease, abdominal pain, constipation, diarrhea, nausea, vomiting and pancreatitis. Do not take this medication if you have a history or family history of multiple endocrine neoplasia syndrome type 2. Side effects reviewed, pt to contact office should one occur.
Solaraze Pregnancy And Lactation Text: This medication is Pregnancy Category B and is considered safe. There is some data to suggest avoiding during the third trimester. It is unknown if this medication is excreted in breast milk.
Ebglyss Counseling: I discussed with the patient the risks of lebrikizumab including but not limited to eye inflammation and irritation, cold sores, injection site reactions, allergic reactions and increased risk of parasitic infection. The patient understands that monitoring is required and they must alert us or the primary physician if symptoms of infection or other concerning signs are noted.
Topical Metronidazole Pregnancy And Lactation Text: This medication is Pregnancy Category B and considered safe during pregnancy.  It is also considered safe to use while breastfeeding.
Doxycycline Pregnancy And Lactation Text: This medication is Pregnancy Category D and not consider safe during pregnancy. It is also excreted in breast milk but is considered safe for shorter treatment courses.
Cellcept Counseling:  I discussed with the patient the risks of mycophenolate mofetil including but not limited to infection/immunosuppression, GI upset, hypokalemia, hypercholesterolemia, bone marrow suppression, lymphoproliferative disorders, malignancy, GI ulceration/bleed/perforation, colitis, interstitial lung disease, kidney failure, progressive multifocal leukoencephalopathy, and birth defects.  The patient understands that monitoring is required including a baseline creatinine and regular CBC testing. In addition, patient must alert us immediately if symptoms of infection or other concerning signs are noted.
Detail Level: Simple
Rinvoq Counseling: I discussed with the patient the risks of Rinvoq therapy including but not limited to upper respiratory tract infections, shingles, cold sores, bronchitis, nausea, cough, fever, acne, and headache. Live vaccines should be avoided.  This medication has been linked to serious infections; higher rate of mortality; malignancy and lymphoproliferative disorders; major adverse cardiovascular events; thrombosis; thrombocytopenia, anemia, and neutropenia; lipid elevations; liver enzyme elevations; and gastrointestinal perforations.
Thalidomide Counseling: I discussed with the patient the risks of thalidomide including but not limited to birth defects, anxiety, weakness, chest pain, dizziness, cough and severe allergy.
Low Dose Naltrexone Counseling- I discussed with the patient the potential risks and side effects of low dose naltrexone including but not limited to: more vivid dreams, headaches, nausea, vomiting, abdominal pain, fatigue, dizziness, and anxiety.
Fluconazole Pregnancy And Lactation Text: This medication is Pregnancy Category C and it isn't know if it is safe during pregnancy. It is also excreted in breast milk.
Opioid Counseling: I discussed with the patient the potential side effects of opioids including but not limited to addiction, altered mental status, and depression. I stressed avoiding alcohol, benzodiazepines, muscle relaxants and sleep aids unless specifically okayed by a physician. The patient verbalized understanding of the proper use and possible adverse effects of opioids. All of the patient's questions and concerns were addressed. They were instructed to flush the remaining pills down the toilet if they did not need them for pain.
Benzoyl Peroxide Counseling: Patient counseled that medicine may cause skin irritation and bleach clothing.  In the event of skin irritation, the patient was advised to reduce the amount of the drug applied or use it less frequently.   The patient verbalized understanding of the proper use and possible adverse effects of benzoyl peroxide.  All of the patient's questions and concerns were addressed.
Simlandi Counseling:  I discussed with the patient the risks of adalimumab including but not limited to myelosuppression, immunosuppression, autoimmune hepatitis, demyelinating diseases, lymphoma, and serious infections.  The patient understands that monitoring is required including a PPD at baseline and must alert us or the primary physician if symptoms of infection or other concerning signs are noted.
Simponi Counseling:  I discussed with the patient the risks of golimumab including but not limited to myelosuppression, immunosuppression, autoimmune hepatitis, demyelinating diseases, lymphoma, and serious infections.  The patient understands that monitoring is required including a PPD at baseline and must alert us or the primary physician if symptoms of infection or other concerning signs are noted.
Xeljanz Counseling: I discussed with the patient the risks of Xeljanz therapy including increased risk of infection, liver issues, headache, diarrhea, or cold symptoms. Live vaccines should be avoided. They were instructed to call if they have any problems.
Tranexamic Acid Counseling:  Patient advised of the small risk of bleeding problems with tranexamic acid. They were also instructed to call if they developed any nausea, vomiting or diarrhea. All of the patient's questions and concerns were addressed.
Carac Counseling:  I discussed with the patient the risks of Carac including but not limited to erythema, scaling, itching, weeping, crusting, and pain.
Cyclophosphamide Counseling:  I discussed with the patient the risks of cyclophosphamide including but not limited to hair loss, hormonal abnormalities, decreased fertility, abdominal pain, diarrhea, nausea and vomiting, bone marrow suppression and infection. The patient understands that monitoring is required while taking this medication.
Niacinamide Counseling: I recommended taking niacin or niacinamide, also know as vitamin B3, twice daily. Recent evidence suggests that taking vitamin B3 (500 mg twice daily) can reduce the risk of actinic keratoses and non-melanoma skin cancers. Side effects of vitamin B3 include flushing and headache.
Klisyri Counseling:  I discussed with the patient the risks of Klisyri including but not limited to erythema, scaling, itching, weeping, crusting, and pain.
Soolantra Counseling: I discussed with the patients the risks of topial Soolantra. This is a medicine which decreases the number of mites and inflammation in the skin. You experience burning, stinging, eye irritation or allergic reactions.  Please call our office if you develop any problems from using this medication.
Griseofulvin Counseling:  I discussed with the patient the risks of griseofulvin including but not limited to photosensitivity, cytopenia, liver damage, nausea/vomiting and severe allergy.  The patient understands that this medication is best absorbed when taken with a fatty meal (e.g., ice cream or french fries).
Erythromycin Counseling:  I discussed with the patient the risks of erythromycin including but not limited to GI upset, allergic reaction, drug rash, diarrhea, increase in liver enzymes, and yeast infections.
Opioid Pregnancy And Lactation Text: These medications can lead to premature delivery and should be avoided during pregnancy. These medications are also present in breast milk in small amounts.
Topical Steroids Counseling: I discussed with the patient that prolonged use of topical steroids can result in the increased appearance of superficial blood vessels (telangiectasias), lightening (hypopigmentation) and thinning of the skin (atrophy).  Patient understands to avoid using high potency steroids in skin folds, the groin or the face.  The patient verbalized understanding of the proper use and possible adverse effects of topical steroids.  All of the patient's questions and concerns were addressed.
Humira Counseling:  I discussed with the patient the risks of adalimumab including but not limited to myelosuppression, immunosuppression, autoimmune hepatitis, demyelinating diseases, lymphoma, and serious infections.  The patient understands that monitoring is required including a PPD at baseline and must alert us or the primary physician if symptoms of infection or other concerning signs are noted.
Rinvoq Pregnancy And Lactation Text: Based on animal studies, Rinvoq may cause embryo-fetal harm when administered to pregnant women.  The medication should not be used in pregnancy.  Breastfeeding is not recommended during treatment and for 6 days after the last dose.
Itraconazole Counseling:  I discussed with the patient the risks of itraconazole including but not limited to liver damage, nausea/vomiting, neuropathy, and severe allergy.  The patient understands that this medication is best absorbed when taken with acidic beverages such as non-diet cola or ginger ale.  The patient understands that monitoring is required including baseline LFTs and repeat LFTs at intervals.  The patient understands that they are to contact us or the primary physician if concerning signs are noted.
Saxenda Counseling: I reviewed the possible side effects including: thyroid tumors, kidney disease, gallbladder disease, abdominal pain, constipation, diarrhea, nausea, vomiting and pancreatitis. Do not take this medication if you have a history or family history of multiple endocrine neoplasia syndrome type 2. Side effects reviewed, pt to contact office should one occur.
Albendazole Counseling:  I discussed with the patient the risks of albendazole including but not limited to cytopenia, kidney damage, nausea/vomiting and severe allergy.  The patient understands that this medication is being used in an off-label manner.
Metronidazole Counseling:  I discussed with the patient the risks of metronidazole including but not limited to seizures, nausea/vomiting, a metallic taste in the mouth, nausea/vomiting and severe allergy.
Enbrel Counseling:  I discussed with the patient the risks of etanercept including but not limited to myelosuppression, immunosuppression, autoimmune hepatitis, demyelinating diseases, lymphoma, and infections.  The patient understands that monitoring is required including a PPD at baseline and must alert us or the primary physician if symptoms of infection or other concerning signs are noted.
Topical Sulfur Applications Counseling: Topical Sulfur Counseling: Patient counseled that this medication may cause skin irritation or allergic reactions.  In the event of skin irritation, the patient was advised to reduce the amount of the drug applied or use it less frequently.   The patient verbalized understanding of the proper use and possible adverse effects of topical sulfur application.  All of the patient's questions and concerns were addressed.
Xeloliverioz Pregnancy And Lactation Text: This medication is Pregnancy Category D and is not considered safe during pregnancy.  The risk during breast feeding is also uncertain.
Klisyri Pregnancy And Lactation Text: It is unknown if this medication can harm a developing fetus or if it is excreted in breast milk.
Tranexamic Acid Pregnancy And Lactation Text: It is unknown if this medication is safe during pregnancy or breast feeding.
Niacinamide Pregnancy And Lactation Text: These medications are considered safe during pregnancy.
Carac Pregnancy And Lactation Text: This medication is Pregnancy Category X and contraindicated in pregnancy and in women who may become pregnant. It is unknown if this medication is excreted in breast milk.
Include Pregnancy/Lactation Warning?: No
Soolantra Pregnancy And Lactation Text: This medication is Pregnancy Category C. This medication is considered safe during breast feeding.
Griseofulvin Pregnancy And Lactation Text: This medication is Pregnancy Category X and is known to cause serious birth defects. It is unknown if this medication is excreted in breast milk but breast feeding should be avoided.
Albendazole Pregnancy And Lactation Text: This medication is Pregnancy Category C and it isn't known if it is safe during pregnancy. It is also excreted in breast milk.
Erythromycin Pregnancy And Lactation Text: This medication is Pregnancy Category B and is considered safe during pregnancy. It is also excreted in breast milk.
Topical Steroids Applications Pregnancy And Lactation Text: Most topical steroids are considered safe to use during pregnancy and lactation.  Any topical steroid applied to the breast or nipple should be washed off before breastfeeding.
Nsaids Counseling: NSAID Counseling: I discussed with the patient that NSAIDs should be taken with food. Prolonged use of NSAIDs can result in the development of stomach ulcers.  Patient advised to stop taking NSAIDs if abdominal pain occurs.  The patient verbalized understanding of the proper use and possible adverse effects of NSAIDs.  All of the patient's questions and concerns were addressed.
Cyclosporine Counseling:  I discussed with the patient the risks of cyclosporine including but not limited to hypertension, gingival hyperplasia,myelosuppression, immunosuppression, liver damage, kidney damage, neurotoxicity, lymphoma, and serious infections. The patient understands that monitoring is required including baseline blood pressure, CBC, CMP, lipid panel and uric acid, and then 1-2 times monthly CMP and blood pressure.
Dutasteride Female Counseling: Dutasteride Counseling:  I discussed with the patient the risks of use of dutasteride including but not limited to decreased libido and sexual dysfunction. Explained the teratogenic nature of the medication and stressed the importance of not getting pregnant during treatment. All of the patient's questions and concerns were addressed.
Arava Counseling:  Patient counseled regarding adverse effects of Arava including but not limited to nausea, vomiting, abnormalities in liver function tests. Patients may develop mouth sores, rash, diarrhea, and abnormalities in blood counts. The patient understands that monitoring is required including LFTs and blood counts.  There is a rare possibility of scarring of the liver and lung problems that can occur when taking methotrexate. Persistent nausea, loss of appetite, pale stools, dark urine, cough, and shortness of breath should be reported immediately. Patient advised to discontinue Arava treatment and consult with a physician prior to attempting conception. The patient will have to undergo a treatment to eliminate Arava from the body prior to conception.
Metronidazole Pregnancy And Lactation Text: This medication is Pregnancy Category B and considered safe during pregnancy.  It is also excreted in breast milk.
Topical Retinoid counseling:  Patient advised to apply a pea-sized amount only at bedtime and wait 30 minutes after washing their face before applying.  If too drying, patient may add a non-comedogenic moisturizer. The patient verbalized understanding of the proper use and possible adverse effects of retinoids.  All of the patient's questions and concerns were addressed.
Valtrex Counseling: I discussed with the patient the risks of valacyclovir including but not limited to kidney damage, nausea, vomiting and severe allergy.  The patient understands that if the infection seems to be worsening or is not improving, they are to call.
Calcipotriene Counseling:  I discussed with the patient the risks of calcipotriene including but not limited to erythema, scaling, itching, and irritation.
Minoxidil Counseling: Minoxidil is a topical medication which can increase blood flow where it is applied. It is uncertain how this medication increases hair growth. Side effects are uncommon and include stinging and allergic reactions.
Dutasteride Male Counseling: Dustasteride Counseling:  I discussed with the patient the risks of use of dutasteride including but not limited to decreased libido, decreased ejaculate volume, and gynecomastia. Women who can become pregnant should not handle medication.  All of the patient's questions and concerns were addressed.
Cyclophosphamide Pregnancy And Lactation Text: This medication is Pregnancy Category D and it isn't considered safe during pregnancy. This medication is excreted in breast milk.
Ivermectin Counseling:  Patient instructed to take medication on an empty stomach with a full glass of water.  Patient informed of potential adverse effects including but not limited to nausea, diarrhea, dizziness, itching, and swelling of the extremities or lymph nodes.  The patient verbalized understanding of the proper use and possible adverse effects of ivermectin.  All of the patient's questions and concerns were addressed.
Wartpeel Counseling:  I discussed with the patient the risks of Wartpeel including but not limited to erythema, scaling, itching, weeping, crusting, and pain.
Cyclosporine Pregnancy And Lactation Text: This medication is Pregnancy Category C and it isn't know if it is safe during pregnancy. This medication is excreted in breast milk.
Nsaids Pregnancy And Lactation Text: These medications are considered safe up to 30 weeks gestation. It is excreted in breast milk.
Ketoconazole Counseling:   Patient counseled regarding improving absorption with orange juice.  Adverse effects include but are not limited to breast enlargement, headache, diarrhea, nausea, upset stomach, liver function test abnormalities, taste disturbance, and stomach pain.  There is a rare possibility of liver failure that can occur when taking ketoconazole. The patient understands that monitoring of LFTs may be required, especially at baseline. The patient verbalized understanding of the proper use and possible adverse effects of ketoconazole.  All of the patient's questions and concerns were addressed.
Minocycline Counseling: Patient advised regarding possible photosensitivity and discoloration of the teeth, skin, lips, tongue and gums.  Patient instructed to avoid sunlight, if possible.  When exposed to sunlight, patients should wear protective clothing, sunglasses, and sunscreen.  The patient was instructed to call the office immediately if the following severe adverse effects occur:  hearing changes, easy bruising/bleeding, severe headache, or vision changes.  The patient verbalized understanding of the proper use and possible adverse effects of minocycline.  All of the patient's questions and concerns were addressed.
Dutasteride Pregnancy And Lactation Text: This medication is absolutely contraindicated in women, especially during pregnancy and breast feeding. Feminization of male fetuses is possible if taking while pregnant.
Sotyktu Counseling:  I discussed the most common side effects of Sotyktu including: common cold, sore throat, sinus infections, cold sores, canker sores, folliculitis, and acne.  I also discussed more serious side effects of Sotyktu including but not limited to: serious allergic reactions; increased risk for infections such as TB; cancers such as lymphomas; rhabdomyolysis and elevated CPK; and elevated triglycerides and liver enzymes. 
Skyrizi Counseling: I discussed with the patient the risks of risankizumab-rzaa including but not limited to immunosuppression, and serious infections.  The patient understands that monitoring is required including a PPD at baseline and must alert us or the primary physician if symptoms of infection or other concerning signs are noted.
Valtrex Pregnancy And Lactation Text: this medication is Pregnancy Category B and is considered safe during pregnancy. This medication is not directly found in breast milk but it's metabolite acyclovir is present.
Calcipotriene Pregnancy And Lactation Text: The use of this medication during pregnancy or lactation is not recommended as there is insufficient data.
Topical Sulfur Applications Pregnancy And Lactation Text: This medication is considered safe during pregnancy and breast feeding secondary to limited systemic absorption.
Spevigo Pregnancy And Lactation Text: The risk during pregnancy and breastfeeding is uncertain with this medication. This medication does cross the placenta. It is unknown if this medication is found in breast milk.
Finasteride Male Counseling: Finasteride Counseling:  I discussed with the patient the risks of use of finasteride including but not limited to decreased libido, decreased ejaculate volume, gynecomastia, and depression. Women should not handle medication.  All of the patient's questions and concerns were addressed.
Sotyktu Pregnancy And Lactation Text: There is insufficient data to evaluate whether or not Sotyktu is safe to use during pregnancy.   It is not known if Sotyktu passes into breast milk and whether or not it is safe to use when breastfeeding.  
Adbry Counseling: I discussed with the patient the risks of tralokinumab including but not limited to eye infection and irritation, cold sores, injection site reactions, worsening of asthma, allergic reactions and increased risk of parasitic infection.  Live vaccines should be avoided while taking tralokinumab. The patient understands that monitoring is required and they must alert us or the primary physician if symptoms of infection or other concerning signs are noted.
Use Enhanced Medication Counseling?: Yes
Olanzapine Counseling- I discussed with the patient the common side effects of olanzapine including but are not limited to: lack of energy, dry mouth, increased appetite, sleepiness, tremor, constipation, dizziness, changes in behavior, or restlessness.  Explained that teenagers are more likely to experience headaches, abdominal pain, pain in the arms or legs, tiredness, and sleepiness.  Serious side effects include but are not limited: increased risk of death in elderly patients who are confused, have memory loss, or dementia-related psychosis; hyperglycemia; increased cholesterol and triglycerides; and weight gain.
Methotrexate Counseling:  Patient counseled regarding adverse effects of methotrexate including but not limited to nausea, vomiting, abnormalities in liver function tests. Patients may develop mouth sores, rash, diarrhea, and abnormalities in blood counts. The patient understands that monitoring is required including LFT's and blood counts.  There is a rare possibility of scarring of the liver and lung problems that can occur when taking methotrexate. Persistent nausea, loss of appetite, pale stools, dark urine, cough, and shortness of breath should be reported immediately. Patient advised to discontinue methotrexate treatment at least three months before attempting to become pregnant.  I discussed the need for folate supplements while taking methotrexate.  These supplements can decrease side effects during methotrexate treatment. The patient verbalized understanding of the proper use and possible adverse effects of methotrexate.  All of the patient's questions and concerns were addressed.
Ketoconazole Pregnancy And Lactation Text: This medication is Pregnancy Category C and it isn't know if it is safe during pregnancy. It is also excreted in breast milk and breast feeding isn't recommended.
Semaglutide Counseling: I reviewed the possible side effects including: thyroid tumors, kidney disease, gallbladder disease, abdominal pain, constipation, diarrhea, nausea, vomiting and pancreatitis. Do not take this medication if you have a history or family history of multiple endocrine neoplasia syndrome type 2. Side effects reviewed, pt to contact office should one occur.
Clofazimine Counseling:  I discussed with the patient the risks of clofazimine including but not limited to skin and eye pigmentation, liver damage, nausea/vomiting, gastrointestinal bleeding and allergy.
Mirvaso Counseling: Mirvaso is a topical medication which can decrease superficial blood flow where applied. Side effects are uncommon and include stinging, redness and allergic reactions.
Tazorac Counseling:  Patient advised that medication is irritating and drying.  Patient may need to apply sparingly and wash off after an hour before eventually leaving it on overnight.  The patient verbalized understanding of the proper use and possible adverse effects of tazorac.  All of the patient's questions and concerns were addressed.
Cantharidin Counseling:  I discussed with the patient the risks of Cantharidin including but not limited to pain, redness, burning, itching, and blistering.
Topical Clindamycin Counseling: Patient counseled that this medication may cause skin irritation or allergic reactions.  In the event of skin irritation, the patient was advised to reduce the amount of the drug applied or use it less frequently.   The patient verbalized understanding of the proper use and possible adverse effects of clindamycin.  All of the patient's questions and concerns were addressed.
Finasteride Female Counseling: Finasteride Counseling:  I discussed with the patient the risks of use of finasteride including but not limited to decreased libido and sexual dysfunction. Explained the teratogenic nature of the medication and stressed the importance of not getting pregnant during treatment. All of the patient's questions and concerns were addressed.
Opzelura Counseling:  I discussed with the patient the risks of Opzelura including but not limited to nasopharngitis, bronchitis, ear infection, eosinophila, hives, diarrhea, folliculitis, tonsillitis, and rhinorrhea.  Taken orally, this medication has been linked to serious infections; higher rate of mortality; malignancy and lymphoproliferative disorders; major adverse cardiovascular events; thrombosis; thrombocytopenia, anemia, and neutropenia; and lipid elevations.
Hyrimoz Counseling:  I discussed with the patient the risks of adalimumab including but not limited to myelosuppression, immunosuppression, autoimmune hepatitis, demyelinating diseases, lymphoma, and serious infections.  The patient understands that monitoring is required including a PPD at baseline and must alert us or the primary physician if symptoms of infection or other concerning signs are noted.
Terbinafine Counseling: Patient counseling regarding adverse effects of terbinafine including but not limited to headache, diarrhea, rash, upset stomach, liver function test abnormalities, itching, taste/smell disturbance, nausea, abdominal pain, and flatulence.  There is a rare possibility of liver failure that can occur when taking terbinafine.  The patient understands that a baseline LFT and kidney function test may be required. The patient verbalized understanding of the proper use and possible adverse effects of terbinafine.  All of the patient's questions and concerns were addressed.
Spevigo Counseling: I discussed with the patient the risks of Spevigo including but not limited to fatigue, nasuea, vomiting, headache, pruritus, urinary tract infection, an infusion related reactions.  The patient understands that monitoring is required including screening for tuberculosis at baseline and yearly screening thereafter while continuing Spevigo therapy. They should contact us if symptoms of infection or other concerning signs are noted.
Quinolones Counseling:  I discussed with the patient the risks of fluoroquinolones including but not limited to GI upset, allergic reaction, drug rash, diarrhea, dizziness, photosensitivity, yeast infections, liver function test abnormalities, tendonitis/tendon rupture.
Winlevi Counseling:  I discussed with the patient the risks of topical clascoterone including but not limited to erythema, scaling, itching, and stinging. Patient voiced their understanding.
Methotrexate Pregnancy And Lactation Text: This medication is Pregnancy Category X and is known to cause fetal harm. This medication is excreted in breast milk.
Cantharidin Pregnancy And Lactation Text: This medication has not been proven safe during pregnancy. It is unknown if this medication is excreted in breast milk.
Olanzapine Pregnancy And Lactation Text: This medication is pregnancy category C.   There are no adequate and well controlled trials with olanzapine in pregnant females.  Olanzapine should be used during pregnancy only if the potential benefit justifies the potential risk to the fetus.   In a study in lactating healthy women, olanzapine was excreted in breast milk.  It is recommended that women taking olanzapine should not breast feed.
Tazorac Pregnancy And Lactation Text: This medication is not safe during pregnancy. It is unknown if this medication is excreted in breast milk.
Oral Minoxidil Pregnancy And Lactation Text: This medication should only be used when clearly needed if you are pregnant, attempting to become pregnant or breast feeding.
Bimzelx Counseling:  I discussed with the patient the risks of Bimzelx including but not limited to depression, immunosuppression, allergic reactions and infections.  The patient understands that monitoring is required including a PPD at baseline and must alert us or the primary physician if symptoms of infection or other concerning signs are noted.
Finasteride Pregnancy And Lactation Text: This medication is absolutely contraindicated during pregnancy. It is unknown if it is excreted in breast milk.
Colchicine Counseling:  Patient counseled regarding adverse effects including but not limited to stomach upset (nausea, vomiting, stomach pain, or diarrhea).  Patient instructed to limit alcohol consumption while taking this medication.  Colchicine may reduce blood counts especially with prolonged use.  The patient understands that monitoring of kidney function and blood counts may be required, especially at baseline. The patient verbalized understanding of the proper use and possible adverse effects of colchicine.  All of the patient's questions and concerns were addressed.
Azithromycin Counseling:  I discussed with the patient the risks of azithromycin including but not limited to GI upset, allergic reaction, drug rash, diarrhea, and yeast infections.
Prednisone Counseling:  I discussed with the patient the risks of prolonged use of prednisone including but not limited to weight gain, insomnia, osteoporosis, mood changes, diabetes, susceptibility to infection, glaucoma and high blood pressure.  In cases where prednisone use is prolonged, patients should be monitored with blood pressure checks, serum glucose levels and an eye exam.  Additionally, the patient may need to be placed on GI prophylaxis, PCP prophylaxis, and calcium and vitamin D supplementation and/or a bisphosphonate.  The patient verbalized understanding of the proper use and the possible adverse effects of prednisone.  All of the patient's questions and concerns were addressed.
Oral Minoxidil Counseling- I discussed with the patient the risks of oral minoxidil including but not limited to shortness of breath, swelling of the feet or ankles, dizziness, lightheadedness, unwanted hair growth and allergic reaction.  The patient verbalized understanding of the proper use and possible adverse effects of oral minoxidil.  All of the patient's questions and concerns were addressed.
Wegovy Counseling: I reviewed the possible side effects including: thyroid tumors, kidney disease, gallbladder disease, abdominal pain, constipation, diarrhea, nausea, vomiting and pancreatitis. Do not take this medication if you have a history or family history of multiple endocrine neoplasia syndrome type 2. Side effects reviewed, pt to contact office should one occur.
Infliximab Counseling:  I discussed with the patient the risks of infliximab including but not limited to myelosuppression, immunosuppression, autoimmune hepatitis, demyelinating diseases, lymphoma, and serious infections.  The patient understands that monitoring is required including a PPD at baseline and must alert us or the primary physician if symptoms of infection or other concerning signs are noted.
5-Fu Counseling: 5-Fluorouracil Counseling:  I discussed with the patient the risks of 5-fluorouracil including but not limited to erythema, scaling, itching, weeping, crusting, and pain.
Drysol Counseling:  I discussed with the patient the risks of drysol/aluminum chloride including but not limited to skin rash, itching, irritation, burning.
Adbry Pregnancy And Lactation Text: It is unknown if this medication will adversely affect pregnancy or breast feeding.
Picato Counseling:  I discussed with the patient the risks of Picato including but not limited to erythema, scaling, itching, weeping, crusting, and pain.
Ilumya Counseling: I discussed with the patient the risks of tildrakizumab including but not limited to immunosuppression, malignancy, posterior leukoencephalopathy syndrome, and serious infections.  The patient understands that monitoring is required including a PPD at baseline and must alert us or the primary physician if symptoms of infection or other concerning signs are noted.
Stelara Counseling:  I discussed with the patient the risks of ustekinumab including but not limited to immunosuppression, malignancy, posterior leukoencephalopathy syndrome, and serious infections.  The patient understands that monitoring is required including a PPD at baseline and must alert us or the primary physician if symptoms of infection or other concerning signs are noted.
Zepbound Counseling: I reviewed the possible side effects including: thyroid tumors, kidney disease, gallbladder disease, abdominal pain, constipation, diarrhea, nausea, vomiting and pancreatitis. Do not take this medication if you have a history or family history of multiple endocrine neoplasia syndrome type 2. Side effects reviewed, pt to contact office should one occur.
Azithromycin Pregnancy And Lactation Text: This medication is considered safe during pregnancy and is also secreted in breast milk.
Xolair Counseling:  Patient informed of potential adverse effects including but not limited to fever, muscle aches, rash and allergic reactions.  The patient verbalized understanding of the proper use and possible adverse effects of Xolair.  All of the patient's questions and concerns were addressed.
Birth Control Pills Counseling: Birth Control Pill Counseling: I discussed with the patient the potential side effects of OCPs including but not limited to increased risk of stroke, heart attack, thrombophlebitis, deep venous thrombosis, hepatic adenomas, breast changes, GI upset, headaches, and depression.  The patient verbalized understanding of the proper use and possible adverse effects of OCPs. All of the patient's questions and concerns were addressed.
Rifampin Counseling: I discussed with the patient the risks of rifampin including but not limited to liver damage, kidney damage, red-orange body fluids, nausea/vomiting and severe allergy.
Acitretin Counseling:  I discussed with the patient the risks of acitretin including but not limited to hair loss, dry lips/skin/eyes, liver damage, hyperlipidemia, depression/suicidal ideation, photosensitivity.  Serious rare side effects can include but are not limited to pancreatitis, pseudotumor cerebri, bony changes, clot formation/stroke/heart attack.  Patient understands that alcohol is contraindicated since it can result in liver toxicity and significantly prolong the elimination of the drug by many years.
Winlevi Pregnancy And Lactation Text: This medication is considered safe during pregnancy and breastfeeding.
Opzelura Pregnancy And Lactation Text: There is insufficient data to evaluate drug-associated risk for major birth defects, miscarriage, or other adverse maternal or fetal outcomes.  There is a pregnancy registry that monitors pregnancy outcomes in pregnant persons exposed to the medication during pregnancy.  It is unknown if this medication is excreted in breast milk.  Do not breastfeed during treatment and for about 4 weeks after the last dose.

## 2025-04-07 NOTE — PROCEDURE: ADDITIONAL NOTES
Render Risk Assessment In Note?: no
Detail Level: Simple
Additional Notes: Dilute distilled white vinegar with water to create a soak for five minutes daily.
Additional Notes: 4.7.25 verbal consult with Dr Mcclure.

## 2025-04-07 NOTE — HPI: RASH (SEBORRHEIC DERMATITIS)
How Severe Is It?: moderate
Is This A New Presentation, Or A Follow-Up?: Rash
Additional History: Patient caregiver reports has reduced in the last year. But not completely clear and comes and goes. Flaring today.

## 2025-04-07 NOTE — PROCEDURE: PRESCRIPTION MEDICATION MANAGEMENT
Initiate Treatment: ketoconazole 2 % shampoo 2-3 x a week\\nQuantity: 120.0 ml\\nSig: Shampoo scalp 3x a week. Leave on for 5 minutes before washing off.\\n\\nciclopirox 0.77 % topical gel Daily\\nQuantity: 45.0 g  Days Supply: 30\\nSig: Apply pea-sized amount topically daily
Detail Level: Zone
Render In Strict Bullet Format?: No
Initiate Treatment: gentamicin 0.3 % eye drops (null)\\nQuantity: 15.0 ml  Days Supply: 30\\nSig: AAA nail twice daily

## 2025-06-27 ENCOUNTER — APPOINTMENT (OUTPATIENT)
Dept: NEUROLOGY | Facility: MEDICAL CENTER | Age: 85
End: 2025-06-27
Attending: INTERNAL MEDICINE
Payer: MEDICARE

## 2025-07-29 ENCOUNTER — TELEPHONE (OUTPATIENT)
Dept: HOSPICE | Facility: MEDICAL CENTER | Age: 85
End: 2025-07-29
Payer: MEDICARE